# Patient Record
Sex: FEMALE | Race: OTHER
[De-identification: names, ages, dates, MRNs, and addresses within clinical notes are randomized per-mention and may not be internally consistent; named-entity substitution may affect disease eponyms.]

---

## 2019-10-09 PROBLEM — Z00.00 ENCOUNTER FOR PREVENTIVE HEALTH EXAMINATION: Status: ACTIVE | Noted: 2019-10-09

## 2019-10-10 ENCOUNTER — APPOINTMENT (OUTPATIENT)
Dept: ENDOCRINOLOGY | Facility: CLINIC | Age: 29
End: 2019-10-10
Payer: MEDICAID

## 2019-10-10 VITALS
DIASTOLIC BLOOD PRESSURE: 73 MMHG | OXYGEN SATURATION: 97 % | SYSTOLIC BLOOD PRESSURE: 101 MMHG | HEART RATE: 102 BPM | HEIGHT: 67 IN | TEMPERATURE: 98.5 F | BODY MASS INDEX: 21.35 KG/M2 | WEIGHT: 136 LBS

## 2019-10-10 DIAGNOSIS — L80 VITILIGO: ICD-10-CM

## 2019-10-10 DIAGNOSIS — Z80.0 FAMILY HISTORY OF MALIGNANT NEOPLASM OF DIGESTIVE ORGANS: ICD-10-CM

## 2019-10-10 DIAGNOSIS — R22.1 LOCALIZED SWELLING, MASS AND LUMP, NECK: ICD-10-CM

## 2019-10-10 DIAGNOSIS — R22.31 LOCALIZED SWELLING, MASS AND LUMP, RIGHT UPPER LIMB: ICD-10-CM

## 2019-10-10 PROCEDURE — 99406 BEHAV CHNG SMOKING 3-10 MIN: CPT

## 2019-10-10 PROCEDURE — 99205 OFFICE O/P NEW HI 60 MIN: CPT | Mod: 25

## 2023-11-12 ENCOUNTER — HOSPITAL ENCOUNTER (EMERGENCY)
Facility: HOSPITAL | Age: 33
Discharge: HOME | End: 2023-11-12
Attending: EMERGENCY MEDICINE
Payer: MEDICAID

## 2023-11-12 ENCOUNTER — APPOINTMENT (EMERGENCY)
Dept: CT IMAGING | Facility: HOSPITAL | Age: 33
End: 2023-11-12
Payer: MEDICAID

## 2023-11-12 VITALS
SYSTOLIC BLOOD PRESSURE: 133 MMHG | HEART RATE: 94 BPM | DIASTOLIC BLOOD PRESSURE: 86 MMHG | RESPIRATION RATE: 20 BRPM | OXYGEN SATURATION: 97 % | TEMPERATURE: 97.6 F
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
## 2023-11-12 DIAGNOSIS — S02.2XXA CLOSED FRACTURE OF NASAL BONE, INITIAL ENCOUNTER: ICD-10-CM

## 2023-11-12 DIAGNOSIS — S09.90XA INJURY OF HEAD, INITIAL ENCOUNTER: ICD-10-CM

## 2023-11-12 DIAGNOSIS — T14.8XXA BRUISING: ICD-10-CM

## 2023-11-12 LAB
EXT PREGNANCY TEST URINE: NEGATIVE
EXT. CONTROL: NORMAL

## 2023-11-12 PROCEDURE — 81025 URINE PREGNANCY TEST: CPT | Performed by: EMERGENCY MEDICINE

## 2023-11-12 PROCEDURE — 99284 EMERGENCY DEPT VISIT MOD MDM: CPT

## 2023-11-12 PROCEDURE — 99284 EMERGENCY DEPT VISIT MOD MDM: CPT | Performed by: EMERGENCY MEDICINE

## 2023-11-12 PROCEDURE — 70486 CT MAXILLOFACIAL W/O DYE: CPT

## 2023-11-12 PROCEDURE — 70450 CT HEAD/BRAIN W/O DYE: CPT

## 2023-11-12 RX ORDER — ACETAMINOPHEN 325 MG/1
650 TABLET ORAL ONCE
Status: COMPLETED | OUTPATIENT
Start: 2023-11-12 | End: 2023-11-12

## 2023-11-12 RX ORDER — IBUPROFEN 400 MG/1
400 TABLET ORAL ONCE
Status: COMPLETED | OUTPATIENT
Start: 2023-11-12 | End: 2023-11-12

## 2023-11-12 RX ADMIN — IBUPROFEN 400 MG: 400 TABLET, FILM COATED ORAL at 05:20

## 2023-11-12 RX ADMIN — ACETAMINOPHEN 650 MG: 325 TABLET, FILM COATED ORAL at 03:52
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
## 2023-11-12 NOTE — DISCHARGE INSTRUCTIONS
Follow up with your primary doctor/outpatient providers, and return to the emergency department for new or worsening symptoms. CT BRAIN - WITHOUT CONTRAST     INDICATION:   head injury. COMPARISON:  None. TECHNIQUE:  CT examination of the brain was performed. Multiplanar 2D reformatted images were created from the source data. Radiation dose length product (DLP) for this visit:  1604.4 mGy-cm . This examination, like all CT scans performed in the [facility], was performed utilizing techniques to minimize radiation dose exposure, including the use of iterative   reconstruction and automated exposure control. IMAGE QUALITY:  Diagnostic. FINDINGS:     PARENCHYMA:  No intracranial mass, mass effect or midline shift. No CT signs of acute infarction. No acute parenchymal hemorrhage. VENTRICLES AND EXTRA-AXIAL SPACES:  Normal for the patient's age. VISUALIZED ORBITS: Normal visualized orbits. PARANASAL SINUSES: Minimal mucosal thickening of the visualized paranasal sinuses. CALVARIUM AND EXTRACRANIAL SOFT TISSUES:  Normal.     IMPRESSION:     No acute intracranial abnormality. CT FACIAL BONES WITHOUT INTRAVENOUS CONTRAST     INDICATION:   nasal pain after injury. COMPARISON: None. TECHNIQUE:  Axial CT images were obtained through the facial bones with additional sagittal and coronal reconstructions. Radiation dose length product (DLP) for this visit:  0 mGy-cm . This examination, like all CT scans performed in the [facility], was performed utilizing techniques to minimize radiation dose exposure, including the use of iterative   reconstruction and automated exposure control. IMAGE QUALITY:  Diagnostic. FINDINGS:     FACIAL BONES: There is a subtle nondisplaced left nasal bone fracture. Normal alignment of the temporomandibular joints. No lytic or blastic lesion.      ORBITS:  Orbital globes, optic nerves, and extraocular muscles appear symmetric and normal. There is no evidence of retrobulbar mass, abscess, or hematoma. SINUSES: There is minimal to mild mucosal thickening of the bilateral maxillary sinuses. No air-fluid levels are identified. SOFT TISSUES:  Normal.     IMPRESSION:     Nondisplaced left nasal bone fracture.

## 2024-01-17 LAB
EXTERNAL CHLAMYDIA SCREEN: NEGATIVE
EXTERNAL GONORRHEA SCREEN: NEGATIVE
EXTERNAL HEMATOCRIT: 36.9 %
EXTERNAL HEMOGLOBIN: 12.7 G/DL
EXTERNAL HEPATITIS B SURFACE ANTIGEN: NEGATIVE
EXTERNAL HIV-1 P24 ANTIGEN: NORMAL
EXTERNAL PLATELET COUNT: 287 K/ÂΜL
EXTERNAL RUBELLA IGG QUANTITATION: 3.49
EXTERNAL SYPHILIS TOTAL IGG/IGM SCREENING: NORMAL
HCV AB SER-ACNC: NON REACTIVE

## 2024-02-14 ENCOUNTER — NON-APPOINTMENT (OUTPATIENT)
Age: 34
End: 2024-02-14

## 2024-02-15 ENCOUNTER — NON-APPOINTMENT (OUTPATIENT)
Age: 34
End: 2024-02-15

## 2024-02-27 ENCOUNTER — NON-APPOINTMENT (OUTPATIENT)
Age: 34
End: 2024-02-27

## 2024-02-27 ENCOUNTER — APPOINTMENT (OUTPATIENT)
Dept: OBGYN | Facility: CLINIC | Age: 34
End: 2024-02-27
Payer: MEDICAID

## 2024-02-27 VITALS
HEIGHT: 67 IN | WEIGHT: 171 LBS | BODY MASS INDEX: 26.84 KG/M2 | DIASTOLIC BLOOD PRESSURE: 60 MMHG | SYSTOLIC BLOOD PRESSURE: 102 MMHG

## 2024-02-27 DIAGNOSIS — F19.91 OTHER PSYCHOACTIVE SUBSTANCE USE, UNSPECIFIED, IN REMISSION: ICD-10-CM

## 2024-02-27 DIAGNOSIS — Z80.8 FAMILY HISTORY OF MALIGNANT NEOPLASM OF OTHER ORGANS OR SYSTEMS: ICD-10-CM

## 2024-02-27 DIAGNOSIS — Z78.9 OTHER SPECIFIED HEALTH STATUS: ICD-10-CM

## 2024-02-27 DIAGNOSIS — Z34.90 ENCOUNTER FOR SUPERVISION OF NORMAL PREGNANCY, UNSPECIFIED, UNSPECIFIED TRIMESTER: ICD-10-CM

## 2024-02-27 DIAGNOSIS — F17.200 NICOTINE DEPENDENCE, UNSPECIFIED, UNCOMPLICATED: ICD-10-CM

## 2024-02-27 LAB
BILIRUB UR QL STRIP: NORMAL
CLARITY UR: CLEAR
COLLECTION METHOD: NORMAL
GLUCOSE UR-MCNC: NORMAL
HCG UR QL: 0.2 EU/DL
HGB UR QL STRIP.AUTO: NORMAL
KETONES UR-MCNC: NORMAL
LEUKOCYTE ESTERASE UR QL STRIP: NORMAL
NITRITE UR QL STRIP: NORMAL
PH UR STRIP: 7
PROT UR STRIP-MCNC: NORMAL
SP GR UR STRIP: 1.01

## 2024-02-27 PROCEDURE — 99203 OFFICE O/P NEW LOW 30 MIN: CPT

## 2024-02-27 PROCEDURE — 36415 COLL VENOUS BLD VENIPUNCTURE: CPT

## 2024-02-27 RX ORDER — ELASTIC BANDAGE 2"X2.2YD
BANDAGE TOPICAL
Refills: 0 | Status: ACTIVE | COMMUNITY

## 2024-03-04 ENCOUNTER — NON-APPOINTMENT (OUTPATIENT)
Age: 34
End: 2024-03-04

## 2024-03-04 LAB
AFP MOM: 1
AFP VALUE: 36.8 NG/ML
ALPHA FETOPROTEIN SERUM COMMENT: NORMAL
ALPHA FETOPROTEIN SERUM INTERPRETATION: NORMAL
ALPHA FETOPROTEIN SERUM RESULTS: NORMAL
ALPHA FETOPROTEIN SERUM TEST RESULTS: NORMAL
GESTATIONAL AGE BASED ON: NORMAL
GESTATIONAL AGE ON COLLECTION DATE: 17.3 WEEKS
INSULIN DEP DIABETES: NO
MATERNAL AGE AT EDD AFP: 36.4 YR
MULTIPLE GESTATION: NO
OSBR RISK 1 IN: NORMAL
RACE: NORMAL
WEIGHT AFP: 171 LBS

## 2024-03-06 ENCOUNTER — NON-APPOINTMENT (OUTPATIENT)
Age: 34
End: 2024-03-06

## 2024-03-18 ENCOUNTER — NON-APPOINTMENT (OUTPATIENT)
Age: 34
End: 2024-03-18

## 2024-03-25 ENCOUNTER — NON-APPOINTMENT (OUTPATIENT)
Age: 34
End: 2024-03-25

## 2024-03-27 ENCOUNTER — APPOINTMENT (OUTPATIENT)
Dept: OBGYN | Facility: CLINIC | Age: 34
End: 2024-03-27
Payer: MEDICAID

## 2024-03-27 VITALS
DIASTOLIC BLOOD PRESSURE: 60 MMHG | HEIGHT: 67 IN | SYSTOLIC BLOOD PRESSURE: 100 MMHG | WEIGHT: 179 LBS | BODY MASS INDEX: 28.09 KG/M2

## 2024-03-27 PROCEDURE — 0502F SUBSEQUENT PRENATAL CARE: CPT

## 2024-03-31 LAB
BILIRUB UR QL STRIP: NEGATIVE
CLARITY UR: NORMAL
COLLECTION METHOD: NORMAL
GLUCOSE UR-MCNC: NEGATIVE
HCG UR QL: 0.2 EU/DL
HGB UR QL STRIP.AUTO: NEGATIVE
KETONES UR-MCNC: NEGATIVE
LEUKOCYTE ESTERASE UR QL STRIP: NEGATIVE
NITRITE UR QL STRIP: NEGATIVE
PH UR STRIP: 6.5
PROT UR STRIP-MCNC: NEGATIVE
SP GR UR STRIP: 1.01

## 2024-04-18 ENCOUNTER — NON-APPOINTMENT (OUTPATIENT)
Age: 34
End: 2024-04-18

## 2024-04-22 DIAGNOSIS — Z11.3 ENCOUNTER FOR SCREENING FOR INFECTIONS WITH A PREDOMINANTLY SEXUAL MODE OF TRANSMISSION: ICD-10-CM

## 2024-04-22 DIAGNOSIS — Z3A.25 25 WEEKS GESTATION OF PREGNANCY: ICD-10-CM

## 2024-05-01 ENCOUNTER — APPOINTMENT (OUTPATIENT)
Dept: OBGYN | Facility: CLINIC | Age: 34
End: 2024-05-01

## 2024-05-08 ENCOUNTER — TELEPHONE (OUTPATIENT)
Dept: PSYCHIATRY | Facility: CLINIC | Age: 34
End: 2024-05-08

## 2024-05-08 NOTE — TELEPHONE ENCOUNTER
Patient has been added to the [facility] wait list without a referral.    Insurance: [employer] (insurance will be active 5/15/24)   Insurance Type:    Commercial []   Medicaid [x]   [facility] (if applicable)   Medicare []  Location Preference: Anywhere  Provider Preference: none  Virtual: Yes [] No [x]  Were outside resources sent: Yes [] No [x]  Advised the patient to contact her PCP for a referral.     Presenting Problem  Stress   Anxiety

## 2024-05-09 ENCOUNTER — NON-APPOINTMENT (OUTPATIENT)
Age: 34
End: 2024-05-09

## 2024-05-13 ENCOUNTER — NON-APPOINTMENT (OUTPATIENT)
Age: 34
End: 2024-05-13

## 2024-05-14 ENCOUNTER — APPOINTMENT (OUTPATIENT)
Dept: OBGYN | Facility: CLINIC | Age: 34
End: 2024-05-14
Payer: MEDICAID

## 2024-05-14 ENCOUNTER — NON-APPOINTMENT (OUTPATIENT)
Age: 34
End: 2024-05-14

## 2024-05-14 VITALS
HEIGHT: 67 IN | WEIGHT: 186 LBS | SYSTOLIC BLOOD PRESSURE: 100 MMHG | DIASTOLIC BLOOD PRESSURE: 70 MMHG | BODY MASS INDEX: 29.19 KG/M2

## 2024-05-14 LAB
BILIRUB UR QL STRIP: NORMAL
CLARITY UR: CLEAR
COLLECTION METHOD: NORMAL
GLUCOSE UR-MCNC: NORMAL
HCG UR QL: 0.2 EU/DL
HGB UR QL STRIP.AUTO: NORMAL
KETONES UR-MCNC: NORMAL
LEUKOCYTE ESTERASE UR QL STRIP: NORMAL
NITRITE UR QL STRIP: NORMAL
PH UR STRIP: 6.5
PROT UR STRIP-MCNC: NORMAL
SP GR UR STRIP: 1.01

## 2024-05-14 PROCEDURE — 0502F SUBSEQUENT PRENATAL CARE: CPT

## 2024-05-14 PROCEDURE — 36415 COLL VENOUS BLD VENIPUNCTURE: CPT

## 2024-05-15 ENCOUNTER — NON-APPOINTMENT (OUTPATIENT)
Age: 34
End: 2024-05-15

## 2024-05-15 LAB
BASOPHILS # BLD AUTO: 0.05 K/UL
BASOPHILS NFR BLD AUTO: 0.3 %
EOSINOPHIL # BLD AUTO: 0.12 K/UL
EOSINOPHIL NFR BLD AUTO: 0.8 %
FERRITIN SERPL-MCNC: 16 NG/ML
GLUCOSE 1H P 50 G GLC PO SERPL-MCNC: 165 MG/DL
HCT VFR BLD CALC: 34.1 %
HGB BLD-MCNC: 11.3 G/DL
IMM GRANULOCYTES NFR BLD AUTO: 0.7 %
LYMPHOCYTES # BLD AUTO: 2.33 K/UL
LYMPHOCYTES NFR BLD AUTO: 15.4 %
MAN DIFF?: NORMAL
MCHC RBC-ENTMCNC: 31.2 PG
MCHC RBC-ENTMCNC: 33.1 GM/DL
MCV RBC AUTO: 94.2 FL
MONOCYTES # BLD AUTO: 1.24 K/UL
MONOCYTES NFR BLD AUTO: 8.2 %
NEUTROPHILS # BLD AUTO: 11.28 K/UL
NEUTROPHILS NFR BLD AUTO: 74.6 %
PLATELET # BLD AUTO: 257 K/UL
RBC # BLD: 3.62 M/UL
RBC # FLD: 13.5 %
WBC # FLD AUTO: 15.12 K/UL

## 2024-05-20 ENCOUNTER — NON-APPOINTMENT (OUTPATIENT)
Age: 34
End: 2024-05-20

## 2024-05-22 ENCOUNTER — NON-APPOINTMENT (OUTPATIENT)
Age: 34
End: 2024-05-22

## 2024-06-16 NOTE — PROGRESS NOTES
S: 34 y.o. G***P*** No obstetric history on file. who presents for viability scan with LMP of ***. She is *** weeks and *** days by her LMP. She reports ***. She *** cramping or vaginal bleeding. This is *** a planned and welcomed pregnancy. Her previous pregnancies ***.     No past medical history on file.    OB History   No obstetric history on file.        O:  There were no vitals filed for this visit.         TVUS: viable, vela *** IUP at *** weeks *** days with CRL ***. FHT ***. DYLON ***. Final dating via ***.      A/P:  #1. IUP at *** weeks and *** days  - Viable pregnancy on TVUS  - RTC in *** week for nurse intake visit    Problem List Items Addressed This Visit    None  Visit Diagnoses       Pelvic pain    -  Primary            Charlotte Blair PA-C  OB/GYN  6/16/2024  5:56 PM

## 2024-06-17 ENCOUNTER — ULTRASOUND (OUTPATIENT)
Dept: OBGYN CLINIC | Facility: CLINIC | Age: 34
End: 2024-06-17
Payer: COMMERCIAL

## 2024-06-17 VITALS
DIASTOLIC BLOOD PRESSURE: 72 MMHG | WEIGHT: 188 LBS | BODY MASS INDEX: 28.49 KG/M2 | SYSTOLIC BLOOD PRESSURE: 100 MMHG | HEIGHT: 68 IN

## 2024-06-17 DIAGNOSIS — N91.2 AMENORRHEA: Primary | ICD-10-CM

## 2024-06-17 DIAGNOSIS — Z34.90 EARLY STAGE OF PREGNANCY: ICD-10-CM

## 2024-06-17 PROCEDURE — 99204 OFFICE O/P NEW MOD 45 MIN: CPT | Performed by: PHYSICIAN ASSISTANT

## 2024-06-17 RX ORDER — OMEGA-3/DHA/EPA/FISH OIL 300-1000MG
2 CAPSULE ORAL DAILY
COMMUNITY
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
## 2024-06-20 ENCOUNTER — TELEPHONE (OUTPATIENT)
Dept: OBGYN CLINIC | Facility: CLINIC | Age: 34
End: 2024-06-20

## 2024-06-20 NOTE — TELEPHONE ENCOUNTER
Placed call to f/u with patient regarding continuing ob care with our office vs establishing with [facility] and midwife team vs moving out of state, as pt was considering these options.  Voicemail not set up and unable to leave a message at this time.

## 2024-06-26 ENCOUNTER — TELEPHONE (OUTPATIENT)
Age: 34
End: 2024-06-26

## 2024-06-28 ENCOUNTER — INITIAL PRENATAL (OUTPATIENT)
Dept: OBGYN CLINIC | Facility: CLINIC | Age: 34
End: 2024-06-28
Payer: COMMERCIAL

## 2024-06-28 ENCOUNTER — TELEPHONE (OUTPATIENT)
Dept: OBGYN CLINIC | Facility: CLINIC | Age: 34
End: 2024-06-28

## 2024-06-28 VITALS — WEIGHT: 197.2 LBS | BODY MASS INDEX: 30.95 KG/M2 | HEIGHT: 67 IN

## 2024-06-28 DIAGNOSIS — Z3A.34 34 WEEKS GESTATION OF PREGNANCY: Primary | ICD-10-CM

## 2024-06-28 DIAGNOSIS — Z91.410 HISTORY OF ADULT DOMESTIC PHYSICAL ABUSE: ICD-10-CM

## 2024-06-28 PROCEDURE — T1001 NURSING ASSESSMENT/EVALUATN: HCPCS | Performed by: NURSE PRACTITIONER
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
## 2024-06-28 NOTE — TELEPHONE ENCOUNTER
Left message patient's voicemail if she is planning OB care with [facility].  She was seen @ [facility] OB on 6/20/2024 & has future appts scheduled with them.   cough

## 2024-07-02 ENCOUNTER — ROUTINE PRENATAL (OUTPATIENT)
Dept: OBGYN CLINIC | Facility: CLINIC | Age: 34
End: 2024-07-02
Payer: COMMERCIAL

## 2024-07-02 ENCOUNTER — TELEPHONE (OUTPATIENT)
Age: 34
End: 2024-07-02

## 2024-07-02 VITALS — BODY MASS INDEX: 30.85 KG/M2 | WEIGHT: 197 LBS | DIASTOLIC BLOOD PRESSURE: 74 MMHG | SYSTOLIC BLOOD PRESSURE: 116 MMHG

## 2024-07-02 DIAGNOSIS — Z91.89 AT INCREASED RISK FOR INTIMATE PARTNER VIOLENCE: ICD-10-CM

## 2024-07-02 DIAGNOSIS — O99.310 ALCOHOL CONSUMPTION DURING PREGNANCY, ANTEPARTUM: ICD-10-CM

## 2024-07-02 DIAGNOSIS — O99.320 DRUG USE AFFECTING PREGNANCY, ANTEPARTUM: Primary | ICD-10-CM

## 2024-07-02 DIAGNOSIS — Z3A.35 35 WEEKS GESTATION OF PREGNANCY: ICD-10-CM

## 2024-07-02 PROCEDURE — 99213 OFFICE O/P EST LOW 20 MIN: CPT | Performed by: OBSTETRICS & GYNECOLOGY
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
## 2024-07-02 NOTE — ASSESSMENT & PLAN NOTE
- Continue PNV  - Labor precautions reviewed  - Fetal kick counts reviewed  - Labs: She is aware that we are still waiting on her lab records. She states that we should have the records from her prior practice no later than Thursday. She is aware that if we do not  have the labs by next week, we will ask her to complete the labs again in the [facility].   - Ultrasounds: Most recent US wnl at [facility] on 24  - Tdap: Declined  - Flu Shot: Will offer in season  - RSV:  Will offer during season (-) @ [id number]   - COVID: Vaccinated  - Rhogam: Unknown  - Delivery:  without epidural (open); She would like to be mobile; wishes for delayed cord clamping - she initially asked regarding 1 hour of delay but we discussed waiting until pulsations end as the blood would no longer be flowing in the cord at 1 hour and this increases her risk of bleeding. She declines all  vaccinations including vitamin K. This is a boy fetus and she does not plan for circumcision.  - Contraception: Doesn't really want BC; had ParaGard previously; Discussed LNG-IUD  - Breastfeeding: Yes; pump ordered  - Pediatrician: JOSE MANUEL; She is hoping to find a pediatrician that will take her without the vaccinations  - RTO in 1 week

## 2024-07-02 NOTE — TELEPHONE ENCOUNTER
Patient called to see if medical records had been received from previous practices. Reviewed patient's chart and , no medical records received or scanned into chart. Spoke with  to confirm. Assured patient she would still be seen at appointment today.

## 2024-07-03 LAB
DME PARACHUTE DELIVERY DATE REQUESTED: NORMAL
DME PARACHUTE ITEM DESCRIPTION: NORMAL
DME PARACHUTE ORDER STATUS: NORMAL
DME PARACHUTE SUPPLIER NAME: NORMAL
DME PARACHUTE SUPPLIER PHONE: NORMAL

## 2024-07-08 ENCOUNTER — ROUTINE PRENATAL (OUTPATIENT)
Dept: OBGYN CLINIC | Facility: CLINIC | Age: 34
End: 2024-07-08
Payer: COMMERCIAL

## 2024-07-08 VITALS — DIASTOLIC BLOOD PRESSURE: 76 MMHG | SYSTOLIC BLOOD PRESSURE: 110 MMHG | WEIGHT: 195 LBS | BODY MASS INDEX: 30.54 KG/M2

## 2024-07-08 DIAGNOSIS — Z3A.36 36 WEEKS GESTATION OF PREGNANCY: Primary | ICD-10-CM

## 2024-07-08 PROCEDURE — 99213 OFFICE O/P EST LOW 20 MIN: CPT | Performed by: OBSTETRICS & GYNECOLOGY
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
## 2024-07-17 ENCOUNTER — TELEPHONE (OUTPATIENT)
Dept: OBGYN CLINIC | Facility: CLINIC | Age: 34
End: 2024-07-17

## 2024-07-17 NOTE — TELEPHONE ENCOUNTER
[facility] to offer appointment tomorrow 7/18 at 7:30 with Leanna.     Teo Price PA-C  [facility]  Patient cancelled out of my schedule for today - 37 weeks and had transferred to us at 34 weeks - not sure if she med all docs; please reach out to see if we can get her in this week - preferably with a doc and stress importance of her weekly appointments until delivery thanks

## 2024-07-18 ENCOUNTER — ROUTINE PRENATAL (OUTPATIENT)
Dept: OBGYN CLINIC | Facility: CLINIC | Age: 34
End: 2024-07-18
Payer: COMMERCIAL

## 2024-07-18 VITALS — WEIGHT: 196 LBS | SYSTOLIC BLOOD PRESSURE: 116 MMHG | DIASTOLIC BLOOD PRESSURE: 72 MMHG | BODY MASS INDEX: 30.7 KG/M2

## 2024-07-18 DIAGNOSIS — Z34.93 PRENATAL CARE IN THIRD TRIMESTER: Primary | ICD-10-CM

## 2024-07-18 DIAGNOSIS — Z91.89 AT INCREASED RISK FOR INTIMATE PARTNER VIOLENCE: ICD-10-CM

## 2024-07-18 DIAGNOSIS — O99.320 DRUG USE AFFECTING PREGNANCY, ANTEPARTUM: ICD-10-CM

## 2024-07-18 DIAGNOSIS — Z3A.35 35 WEEKS GESTATION OF PREGNANCY: ICD-10-CM

## 2024-07-18 PROCEDURE — 99213 OFFICE O/P EST LOW 20 MIN: CPT | Performed by: STUDENT IN AN ORGANIZED HEALTH CARE EDUCATION/TRAINING PROGRAM
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
## 2024-07-24 NOTE — PROGRESS NOTES
VISIT 34 yr old  at 38w4d: GBS neg; (+) n - vomited once; (+) heartburn; irreg Bhs; (+) spotting - noticed with mucous for first time yesterday; Denies HA/lof/edema/dv; (+) smoking - vaping - encouraged discontinuation; urine trace/neg  Labs under media - did not receive GDM screen - states failed 1 hour and passed 3 hour - reviewed the importance of having results scanned in chart or PP they may need to screen baby's sugars - will plan to call prior OB or check on a mychart; PNC - none - transferred at 34w;  PNVs + DHA - tolerating daily  Good FM - r/wanda 10 kicks/2 hrs   No change in cervix - FT/50/-2    Encouraged hydration.   Reviewed signs and symptoms of labor and when to call; Reviewed signs and symptoms of pregnancy induced hypertension or preeclampsia and when to call  Reviewed eIOL - would like to try for labor on own; open to induction if needed - reviewed 41 if not delivered by then or sooner if desires  RTO in 1 week for routine ob check or sooner if needed

## 2024-07-25 ENCOUNTER — ROUTINE PRENATAL (OUTPATIENT)
Dept: OBGYN CLINIC | Facility: CLINIC | Age: 34
End: 2024-07-25
Payer: COMMERCIAL

## 2024-07-25 VITALS — WEIGHT: 197 LBS | DIASTOLIC BLOOD PRESSURE: 78 MMHG | BODY MASS INDEX: 30.85 KG/M2 | SYSTOLIC BLOOD PRESSURE: 112 MMHG

## 2024-07-25 DIAGNOSIS — Z3A.38 38 WEEKS GESTATION OF PREGNANCY: Primary | ICD-10-CM

## 2024-07-25 DIAGNOSIS — O99.310 ALCOHOL CONSUMPTION DURING PREGNANCY, ANTEPARTUM: ICD-10-CM

## 2024-07-25 DIAGNOSIS — Z91.89 AT INCREASED RISK FOR INTIMATE PARTNER VIOLENCE: ICD-10-CM

## 2024-07-25 DIAGNOSIS — O99.320 DRUG USE AFFECTING PREGNANCY, ANTEPARTUM: ICD-10-CM

## 2024-07-25 LAB
DME PARACHUTE DELIVERY DATE ACTUAL: NORMAL
DME PARACHUTE DELIVERY DATE REQUESTED: NORMAL
DME PARACHUTE ITEM DESCRIPTION: NORMAL
DME PARACHUTE ORDER STATUS: NORMAL
DME PARACHUTE SUPPLIER NAME: NORMAL
DME PARACHUTE SUPPLIER PHONE: NORMAL

## 2024-07-25 PROCEDURE — 99213 OFFICE O/P EST LOW 20 MIN: CPT | Performed by: PHYSICIAN ASSISTANT

## 2024-07-29 ENCOUNTER — ROUTINE PRENATAL (OUTPATIENT)
Dept: OBGYN CLINIC | Facility: CLINIC | Age: 34
End: 2024-07-29
Payer: COMMERCIAL

## 2024-07-29 ENCOUNTER — CLINICAL SUPPORT (OUTPATIENT)
Dept: POSTPARTUM | Facility: CLINIC | Age: 34
End: 2024-07-29

## 2024-07-29 VITALS — BODY MASS INDEX: 31.01 KG/M2 | WEIGHT: 198 LBS | SYSTOLIC BLOOD PRESSURE: 100 MMHG | DIASTOLIC BLOOD PRESSURE: 78 MMHG

## 2024-07-29 DIAGNOSIS — Z32.2 ENCOUNTER FOR CHILDBIRTH INSTRUCTION: Primary | ICD-10-CM

## 2024-07-29 DIAGNOSIS — Z34.93 PRENATAL CARE IN THIRD TRIMESTER: Primary | ICD-10-CM

## 2024-07-29 PROCEDURE — 99213 OFFICE O/P EST LOW 20 MIN: CPT | Performed by: STUDENT IN AN ORGANIZED HEALTH CARE EDUCATION/TRAINING PROGRAM
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
## 2024-08-03 ENCOUNTER — CLINICAL SUPPORT (OUTPATIENT)
Age: 34
End: 2024-08-03

## 2024-08-03 DIAGNOSIS — Z32.2 ENCOUNTER FOR CHILDBIRTH INSTRUCTION: Primary | ICD-10-CM

## 2024-08-08 ENCOUNTER — HOSPITAL ENCOUNTER (INPATIENT)
Facility: HOSPITAL | Age: 34
LOS: 3 days | Discharge: HOME | DRG: 560 | End: 2024-08-11
Attending: STUDENT IN AN ORGANIZED HEALTH CARE EDUCATION/TRAINING PROGRAM | Admitting: STUDENT IN AN ORGANIZED HEALTH CARE EDUCATION/TRAINING PROGRAM
Payer: COMMERCIAL

## 2024-08-08 ENCOUNTER — ROUTINE PRENATAL (OUTPATIENT)
Dept: OBGYN CLINIC | Facility: CLINIC | Age: 34
End: 2024-08-08
Payer: COMMERCIAL

## 2024-08-08 VITALS — WEIGHT: 198 LBS | DIASTOLIC BLOOD PRESSURE: 74 MMHG | SYSTOLIC BLOOD PRESSURE: 106 MMHG | BODY MASS INDEX: 31.01 KG/M2

## 2024-08-08 DIAGNOSIS — Z34.93 PRENATAL CARE IN THIRD TRIMESTER: ICD-10-CM

## 2024-08-08 DIAGNOSIS — O36.8130 DECREASED FETAL MOVEMENTS IN THIRD TRIMESTER, SINGLE OR UNSPECIFIED FETUS: ICD-10-CM

## 2024-08-08 DIAGNOSIS — Z34.90 ENCOUNTER FOR INDUCTION OF LABOR: ICD-10-CM

## 2024-08-08 DIAGNOSIS — O41.00X0 OLIGOHYDRAMNIOS, ANTEPARTUM, SINGLE OR UNSPECIFIED FETUS: ICD-10-CM

## 2024-08-08 DIAGNOSIS — Z3A.40 40 WEEKS GESTATION OF PREGNANCY: Primary | ICD-10-CM

## 2024-08-08 DIAGNOSIS — Z91.89 AT INCREASED RISK FOR INTIMATE PARTNER VIOLENCE: ICD-10-CM

## 2024-08-08 PROBLEM — R30.0 DYSURIA: Status: ACTIVE | Noted: 2024-08-08

## 2024-08-08 LAB
ABO GROUP BLD: NORMAL
ABO GROUP BLD: NORMAL
AMPHETAMINES SERPL QL SCN: NEGATIVE
BACTERIA UR QL AUTO: ABNORMAL /HPF
BARBITURATES UR QL: NEGATIVE
BENZODIAZ UR QL: NEGATIVE
BILIRUB UR QL STRIP: NEGATIVE
BLD GP AB SCN SERPL QL: NEGATIVE
CLARITY UR: CLEAR
COCAINE UR QL: NEGATIVE
COLOR UR: ABNORMAL
ERYTHROCYTE [DISTWIDTH] IN BLOOD BY AUTOMATED COUNT: 13.2 % (ref 11.6–15.1)
FENTANYL UR QL SCN: NEGATIVE
GLUCOSE UR STRIP-MCNC: NEGATIVE MG/DL
HCT VFR BLD AUTO: 39.9 % (ref 34.8–46.1)
HGB BLD-MCNC: 13.6 G/DL (ref 11.5–15.4)
HGB UR QL STRIP.AUTO: ABNORMAL
HOLD SPECIMEN: NORMAL
HYDROCODONE UR QL SCN: NEGATIVE
KETONES UR STRIP-MCNC: NEGATIVE MG/DL
LEUKOCYTE ESTERASE UR QL STRIP: ABNORMAL
MCH RBC QN AUTO: 31.9 PG (ref 26.8–34.3)
MCHC RBC AUTO-ENTMCNC: 34.1 G/DL (ref 31.4–37.4)
MCV RBC AUTO: 93 FL (ref 82–98)
METHADONE UR QL: NEGATIVE
NITRITE UR QL STRIP: NEGATIVE
NON-SQ EPI CELLS URNS QL MICRO: ABNORMAL /HPF
OPIATES UR QL SCN: NEGATIVE
OXYCODONE+OXYMORPHONE UR QL SCN: NEGATIVE
PCP UR QL: NEGATIVE
PH UR STRIP.AUTO: 6 [PH]
PLATELET # BLD AUTO: 181 THOUSANDS/UL (ref 149–390)
PMV BLD AUTO: 11.5 FL (ref 8.9–12.7)
PROT UR STRIP-MCNC: ABNORMAL MG/DL
RBC # BLD AUTO: 4.27 MILLION/UL (ref 3.81–5.12)
RBC #/AREA URNS AUTO: ABNORMAL /HPF
RH BLD: POSITIVE
RH BLD: POSITIVE
SP GR UR STRIP.AUTO: 1 (ref 1–1.03)
SPECIMEN EXPIRATION DATE: NORMAL
THC UR QL: NEGATIVE
UROBILINOGEN UR STRIP-ACNC: <2 MG/DL
WBC # BLD AUTO: 12.97 THOUSAND/UL (ref 4.31–10.16)
WBC #/AREA URNS AUTO: ABNORMAL /HPF

## 2024-08-08 PROCEDURE — 86780 TREPONEMA PALLIDUM: CPT

## 2024-08-08 PROCEDURE — 80307 DRUG TEST PRSMV CHEM ANLYZR: CPT

## 2024-08-08 PROCEDURE — 99213 OFFICE O/P EST LOW 20 MIN: CPT | Performed by: STUDENT IN AN ORGANIZED HEALTH CARE EDUCATION/TRAINING PROGRAM

## 2024-08-08 PROCEDURE — 86901 BLOOD TYPING SEROLOGIC RH(D): CPT

## 2024-08-08 PROCEDURE — 86850 RBC ANTIBODY SCREEN: CPT

## 2024-08-08 PROCEDURE — NC001 PR NO CHARGE: Performed by: STUDENT IN AN ORGANIZED HEALTH CARE EDUCATION/TRAINING PROGRAM

## 2024-08-08 PROCEDURE — 99213 OFFICE O/P EST LOW 20 MIN: CPT

## 2024-08-08 PROCEDURE — 81001 URINALYSIS AUTO W/SCOPE: CPT

## 2024-08-08 PROCEDURE — 86900 BLOOD TYPING SEROLOGIC ABO: CPT

## 2024-08-08 PROCEDURE — 4A1HXCZ MONITORING OF PRODUCTS OF CONCEPTION, CARDIAC RATE, EXTERNAL APPROACH: ICD-10-PCS | Performed by: OBSTETRICS & GYNECOLOGY

## 2024-08-08 PROCEDURE — 85027 COMPLETE CBC AUTOMATED: CPT

## 2024-08-08 PROCEDURE — 3E033VJ INTRODUCTION OF OTHER HORMONE INTO PERIPHERAL VEIN, PERCUTANEOUS APPROACH: ICD-10-PCS | Performed by: OBSTETRICS & GYNECOLOGY

## 2024-08-08 RX ORDER — BUPIVACAINE HYDROCHLORIDE 2.5 MG/ML
30 INJECTION, SOLUTION EPIDURAL; INFILTRATION; INTRACAUDAL ONCE AS NEEDED
Status: DISCONTINUED | OUTPATIENT
Start: 2024-08-08 | End: 2024-08-09

## 2024-08-08 RX ORDER — SODIUM CHLORIDE, SODIUM LACTATE, POTASSIUM CHLORIDE, CALCIUM CHLORIDE 600; 310; 30; 20 MG/100ML; MG/100ML; MG/100ML; MG/100ML
125 INJECTION, SOLUTION INTRAVENOUS CONTINUOUS
Status: DISCONTINUED | OUTPATIENT
Start: 2024-08-08 | End: 2024-08-11 | Stop reason: HOSPADM

## 2024-08-08 RX ORDER — OXYTOCIN/RINGER'S LACTATE 30/500 ML
1-30 PLASTIC BAG, INJECTION (ML) INTRAVENOUS
Status: DISCONTINUED | OUTPATIENT
Start: 2024-08-08 | End: 2024-08-09

## 2024-08-08 RX ORDER — FLUCONAZOLE 150 MG/1
150 TABLET ORAL ONCE
Status: COMPLETED | OUTPATIENT
Start: 2024-08-08 | End: 2024-08-08

## 2024-08-08 RX ORDER — ONDANSETRON 2 MG/ML
4 INJECTION INTRAMUSCULAR; INTRAVENOUS EVERY 6 HOURS PRN
Status: DISCONTINUED | OUTPATIENT
Start: 2024-08-08 | End: 2024-08-09

## 2024-08-08 RX ADMIN — OXYTOCIN 2 MILLI-UNITS/MIN: 10 INJECTION INTRAVENOUS at 19:15

## 2024-08-08 RX ADMIN — FLUCONAZOLE 150 MG: 150 TABLET ORAL at 19:16

## 2024-08-08 RX ADMIN — SODIUM CHLORIDE, SODIUM LACTATE, POTASSIUM CHLORIDE, AND CALCIUM CHLORIDE 125 ML/HR: .6; .31; .03; .02 INJECTION, SOLUTION INTRAVENOUS at 18:14

## 2024-08-08 NOTE — ASSESSMENT & PLAN NOTE
- admit for induction of labor iso oligohydramnios and decreased fetal movement  - IV fluids, clear liquid diet  - labs: CBC, RPR, T/S  - analgesia at maternal request  - management: Gregory balloon, pitocin titration

## 2024-08-08 NOTE — PLAN OF CARE
Problem: PAIN - ADULT  Goal: Verbalizes/displays adequate comfort level or baseline comfort level  Description: Interventions:  - Encourage patient to monitor pain and request assistance  - Assess pain using appropriate pain scale  - Administer analgesics based on type and severity of pain and evaluate response  - Implement non-pharmacological measures as appropriate and evaluate response  - Consider cultural and social influences on pain and pain management  - Notify physician/advanced practitioner if interventions unsuccessful or patient reports new pain  Outcome: Progressing     Problem: INFECTION - ADULT  Goal: Absence or prevention of progression during hospitalization  Description: INTERVENTIONS:  - Assess and monitor for signs and symptoms of infection  - Monitor lab/diagnostic results  - Monitor all insertion sites, i.e. indwelling lines, tubes, and drains  - Monitor endotracheal if appropriate and nasal secretions for changes in amount and color  - [place] appropriate cooling/warming therapies per order  - Administer medications as ordered  - Instruct and encourage patient and family to use good hand hygiene technique  - Identify and instruct in appropriate isolation precautions for identified infection/condition  Outcome: Progressing  Goal: Absence of fever/infection during neutropenic period  Description: INTERVENTIONS:  - Monitor WBC    Outcome: Progressing     Problem: SAFETY ADULT  Goal: Patient will remain free of falls  Description: INTERVENTIONS:  - Educate patient/family on patient safety including physical limitations  - Instruct patient to call for assistance with activity   - Consult OT/PT to assist with strengthening/mobility   - Keep Call bell within reach  - Keep bed low and locked with side rails adjusted as appropriate  - Keep care items and personal belongings within reach  - Initiate and maintain comfort rounds  - Make Fall Risk Sign visible to staff  - Offer Toileting every PRN  Hours, in advance of need  - Apply yellow socks and bracelet for high fall risk patients  - Consider moving patient to room near nurses station  Outcome: Progressing  Goal: Maintain or return to baseline ADL function  Description: INTERVENTIONS:  -  Assess patient's ability to carry out ADLs; assess patient's baseline for ADL function and identify physical deficits which impact ability to perform ADLs (bathing, care of mouth/teeth, toileting, grooming, dressing, etc.)  - Assess/evaluate cause of self-care deficits   - Assess range of motion  - Assess patient's mobility; develop plan if impaired  - Assess patient's need for assistive devices and provide as appropriate  - Encourage maximum independence but intervene and supervise when necessary  - Involve family in performance of ADLs  - Assess for home care needs following discharge   - Consider OT consult to assist with ADL evaluation and planning for discharge  - Provide patient education as appropriate  Outcome: Progressing  Goal: Maintains/Returns to pre admission functional level  Description: INTERVENTIONS:  - Perform AM-PAC 6 Click Basic Mobility/ Daily Activity assessment daily.  - Set and communicate daily mobility goal to care team and patient/family/caregiver.   - Collaborate with rehabilitation services on mobility goals if consulted  - Perform Range of Motion PRN times a day.  - Reposition patient every PRN hours.  - Dangle patient PRN times a day  - Stand patient PRN times a day  - Ambulate patient PRN times a day  - Out of bed to chair PRN times a day   - Out of bed for meals PRN times a day  - Out of bed for toileting  - Record patient progress and toleration of activity level   Outcome: Progressing     Problem: DISCHARGE PLANNING  Goal: Discharge to home or other facility with appropriate resources  Description: INTERVENTIONS:  - Identify barriers to discharge w/patient and caregiver  - Arrange for needed discharge resources and transportation as  appropriate  - Identify discharge learning needs (meds, wound care, etc.)  - Arrange for interpretive services to assist at discharge as needed  - Refer to Case Management Department for coordinating discharge planning if the patient needs post-hospital services based on physician/advanced practitioner order or complex needs related to functional status, cognitive ability, or social support system  Outcome: Progressing     Problem: Knowledge Deficit  Goal: Patient/family/caregiver demonstrates understanding of disease process, treatment plan, medications, and discharge instructions  Description: Complete learning assessment and assess knowledge base.  Interventions:  - Provide teaching at level of understanding  - Provide teaching via preferred learning methods  Outcome: Progressing  Goal: Verbalizes understanding of labor plan  Description: Assess patient/family/caregiver's baseline knowledge level and ability to understand information.  Provide education via patient/family/caregiver's preferred learning method at appropriate level of understanding.     1. Provide teaching at level of understanding.  2. Provide teaching via preferred learning method(s).  Outcome: Progressing     Problem: Labor & Delivery  Goal: Manages discomfort  Description: Assess and monitor for signs and symptoms of discomfort.  Assess patient's pain level regularly and per hospital policy.  Administer medications as ordered. Support use of nonpharmacological methods to help control pain such as distraction, imagery, relaxation, and application of heat and cold.  Collaborate with interdisciplinary team and patient to determine appropriate pain management plan.    1. Include patient in decisions related to comfort.  2. Offer non-pharmacological pain management interventions.  3. Report ineffective pain management to physician.  Outcome: Progressing  Goal: Patient vital signs are stable  Description: 1. Assess vital signs - vaginal  delivery.  Outcome: Progressing

## 2024-08-08 NOTE — ASSESSMENT & PLAN NOTE
Present on admission to triage. Patient also reports yellowish vaginal discharge x1 week. Yeast seen on KOH slide.  - fluconazole 150mg  - f/u UA with reflex to cx
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
## 2024-08-08 NOTE — PROGRESS NOTES
Cathryn is a 34 y.o.  40w4d. Reports ++FM, no LOF, VB, or regular contractions.     Vitals:    24 1300   BP: 106/74     S=D  +FHTs  SVE 1.5/70/-2  TAUS: vertex, DVP 1.79    A/P:  Elevated 1hr, normal 3hr GTT  GBS negative  Prefers minimal intervention and calm birthing atmosphere, delayed cord   Rh status POS    Breastfeeding: yes, has pump  Birth plan: reviewed recommendation for APFS if into 41 week, would accept IOL late next week     Sent to L&D for IOL

## 2024-08-09 ENCOUNTER — ANESTHESIA EVENT (INPATIENT)
Dept: ANESTHESIOLOGY | Facility: HOSPITAL | Age: 34
End: 2024-08-09
Payer: COMMERCIAL

## 2024-08-09 ENCOUNTER — ANESTHESIA (INPATIENT)
Dept: ANESTHESIOLOGY | Facility: HOSPITAL | Age: 34
End: 2024-08-09
Payer: COMMERCIAL

## 2024-08-09 LAB
BASE EXCESS BLDCOA CALC-SCNC: -9.5 MMOL/L (ref 3–11)
BASE EXCESS BLDCOV CALC-SCNC: -7.2 MMOL/L (ref 1–9)
HCO3 BLDCOA-SCNC: 20.7 MMOL/L (ref 17.3–27.3)
HCO3 BLDCOV-SCNC: 20.8 MMOL/L (ref 12.2–28.6)
O2 CT VFR BLDCOA CALC: 12.7 ML/DL
OXYHGB MFR BLDCOA: 48.4 %
OXYHGB MFR BLDCOV: 39.8 %
PCO2 BLDCOA: 61.2 MM[HG] (ref 30–60)
PCO2 BLDCOV: 50.8 MM HG (ref 27–43)
PH BLDCOA: 7.15 [PH] (ref 7.23–7.43)
PH BLDCOV: 7.23 [PH] (ref 7.19–7.49)
PO2 BLDCOA: 27.7 MM HG (ref 5–25)
PO2 BLDCOV: 21.1 MM HG (ref 15–45)
SAO2 % BLDCOV: 9.9 ML/DL
TREPONEMA PALLIDUM IGG+IGM AB [PRESENCE] IN SERUM OR PLASMA BY IMMUNOASSAY: NORMAL

## 2024-08-09 PROCEDURE — 59409 OBSTETRICAL CARE: CPT | Performed by: OBSTETRICS & GYNECOLOGY

## 2024-08-09 PROCEDURE — 82805 BLOOD GASES W/O2 SATURATION: CPT | Performed by: STUDENT IN AN ORGANIZED HEALTH CARE EDUCATION/TRAINING PROGRAM

## 2024-08-09 PROCEDURE — 0UQMXZZ REPAIR VULVA, EXTERNAL APPROACH: ICD-10-PCS | Performed by: OBSTETRICS & GYNECOLOGY

## 2024-08-09 RX ORDER — SIMETHICONE 80 MG
80 TABLET,CHEWABLE ORAL 4 TIMES DAILY PRN
Status: DISCONTINUED | OUTPATIENT
Start: 2024-08-09 | End: 2024-08-11 | Stop reason: HOSPADM

## 2024-08-09 RX ORDER — OXYCODONE HYDROCHLORIDE 5 MG/1
5 TABLET ORAL ONCE AS NEEDED
Status: DISCONTINUED | OUTPATIENT
Start: 2024-08-09 | End: 2024-08-11 | Stop reason: HOSPADM

## 2024-08-09 RX ORDER — LIDOCAINE HYDROCHLORIDE AND EPINEPHRINE 15; 5 MG/ML; UG/ML
INJECTION, SOLUTION EPIDURAL AS NEEDED
Status: DISCONTINUED | OUTPATIENT
Start: 2024-08-09 | End: 2024-08-10 | Stop reason: HOSPADM

## 2024-08-09 RX ORDER — CALCIUM CARBONATE 500 MG/1
1000 TABLET, CHEWABLE ORAL DAILY PRN
Status: DISCONTINUED | OUTPATIENT
Start: 2024-08-09 | End: 2024-08-11 | Stop reason: HOSPADM

## 2024-08-09 RX ORDER — DOCUSATE SODIUM 100 MG/1
100 CAPSULE, LIQUID FILLED ORAL 2 TIMES DAILY
Status: DISCONTINUED | OUTPATIENT
Start: 2024-08-09 | End: 2024-08-11 | Stop reason: HOSPADM

## 2024-08-09 RX ORDER — BENZOCAINE/MENTHOL 6 MG-10 MG
1 LOZENGE MUCOUS MEMBRANE DAILY PRN
Status: DISCONTINUED | OUTPATIENT
Start: 2024-08-09 | End: 2024-08-11 | Stop reason: HOSPADM

## 2024-08-09 RX ORDER — ACETAMINOPHEN 325 MG/1
975 TABLET ORAL EVERY 6 HOURS
Status: DISCONTINUED | OUTPATIENT
Start: 2024-08-10 | End: 2024-08-11 | Stop reason: HOSPADM

## 2024-08-09 RX ORDER — DIPHENHYDRAMINE HYDROCHLORIDE 50 MG/ML
25 INJECTION INTRAMUSCULAR; INTRAVENOUS EVERY 6 HOURS PRN
Status: DISCONTINUED | OUTPATIENT
Start: 2024-08-09 | End: 2024-08-11 | Stop reason: HOSPADM

## 2024-08-09 RX ORDER — IBUPROFEN 600 MG/1
600 TABLET, FILM COATED ORAL EVERY 6 HOURS
Status: DISCONTINUED | OUTPATIENT
Start: 2024-08-09 | End: 2024-08-11 | Stop reason: HOSPADM

## 2024-08-09 RX ORDER — OXYTOCIN/RINGER'S LACTATE 30/500 ML
250 PLASTIC BAG, INJECTION (ML) INTRAVENOUS ONCE
Status: COMPLETED | OUTPATIENT
Start: 2024-08-09 | End: 2024-08-10

## 2024-08-09 RX ORDER — ONDANSETRON 2 MG/ML
4 INJECTION INTRAMUSCULAR; INTRAVENOUS EVERY 8 HOURS PRN
Status: DISCONTINUED | OUTPATIENT
Start: 2024-08-09 | End: 2024-08-11 | Stop reason: HOSPADM

## 2024-08-09 RX ADMIN — LIDOCAINE HYDROCHLORIDE AND EPINEPHRINE 2 ML: 15; 5 INJECTION, SOLUTION EPIDURAL at 10:36

## 2024-08-09 RX ADMIN — SODIUM CHLORIDE, SODIUM LACTATE, POTASSIUM CHLORIDE, AND CALCIUM CHLORIDE 125 ML/HR: .6; .31; .03; .02 INJECTION, SOLUTION INTRAVENOUS at 10:59

## 2024-08-09 RX ADMIN — LIDOCAINE HYDROCHLORIDE AND EPINEPHRINE 3 ML: 15; 5 INJECTION, SOLUTION EPIDURAL at 10:34

## 2024-08-09 RX ADMIN — SODIUM CHLORIDE, SODIUM LACTATE, POTASSIUM CHLORIDE, AND CALCIUM CHLORIDE 125 ML/HR: .6; .31; .03; .02 INJECTION, SOLUTION INTRAVENOUS at 18:34

## 2024-08-09 RX ADMIN — ROPIVACAINE HYDROCHLORIDE: 2 INJECTION, SOLUTION EPIDURAL; INFILTRATION at 18:58

## 2024-08-09 RX ADMIN — ROPIVACAINE HYDROCHLORIDE: 2 INJECTION, SOLUTION EPIDURAL; INFILTRATION at 10:43

## 2024-08-09 RX ADMIN — OXYTOCIN 250 MILLI-UNITS/MIN: 10 INJECTION INTRAVENOUS at 22:19

## 2024-08-09 RX ADMIN — ONDANSETRON 4 MG: 2 INJECTION INTRAMUSCULAR; INTRAVENOUS at 19:41

## 2024-08-09 RX ADMIN — SODIUM CHLORIDE, SODIUM LACTATE, POTASSIUM CHLORIDE, AND CALCIUM CHLORIDE 999 ML/HR: .6; .31; .03; .02 INJECTION, SOLUTION INTRAVENOUS at 09:48

## 2024-08-09 RX ADMIN — SODIUM CHLORIDE, SODIUM LACTATE, POTASSIUM CHLORIDE, AND CALCIUM CHLORIDE 125 ML/HR: .6; .31; .03; .02 INJECTION, SOLUTION INTRAVENOUS at 02:14

## 2024-08-09 NOTE — OB LABOR/OXYTOCIN SAFETY PROGRESS
Oxytocin Safety Progress Check Note - Cathryn Craig 34 y.o. female MRN: [id number]    Unit/Bed#: -01 Encounter: [id number]    Dose (lizz-units/min) Oxytocin: 20 lizz-units/min  Contraction Frequency (minutes): 2-3  Contraction Intensity: Moderate  Uterine Activity Characteristics: Regular  Cervical Dilation: 4        Cervical Effacement: 80  Fetal Station: -1  Baseline Rate (FHR): 135 bpm  Fetal Heart Rate (FHT): 129 BPM  FHR Category: 1               Vital Signs:   Vitals:    08/09/24 1227   BP: 120/80   Pulse: 78   Resp:    Temp:    SpO2:        Notes/comments:   PT comfortable, no concerns or complaints.      Kaitlin Sepulveda MD 8/9/2024 12:36 PM

## 2024-08-09 NOTE — OB LABOR/OXYTOCIN SAFETY PROGRESS
Oxytocin Safety Progress Check Note- Cathryn Craig 34 y.o. female MRN: [id number]    Unit/Bed#: -01 Encounter: [id number]    Dose (lizz-units/min) Oxytocin: 6 lizz-units/min  Contraction Frequency (minutes): 1-4  Contraction Intensity: Mild  Uterine Activity Characteristics: Irregular  Cervical Dilation: 3-4        Cervical Effacement: 70  Fetal Station: -2  Baseline Rate (FHR): 130 bpm  Fetal Heart Rate (FHT): 131 BPM  FHR Category:                Vital Signs:   Vitals:    08/08/24 2307   BP: 125/71   Pulse: 75   Resp:    Temp:    SpO2:    .  Cathryn is comfortable. SVE unchanged. FHT cat 1. Continue titrating pitocin as tolerated.    Discussed w Dr. Blanca Shaffer MD 8/9/2024 12:31 AM

## 2024-08-09 NOTE — OB LABOR/OXYTOCIN SAFETY PROGRESS
Oxytocin Safety Progress Check Note - Cathryn Craig 34 y.o. female MRN: [id number]    Unit/Bed#: -01 Encounter: [id number]    Dose (lizz-units/min) Oxytocin: 18 lizz-units/min  Contraction Frequency (minutes): 2-5  Contraction Intensity: Moderate  Uterine Activity Characteristics: Irregular  Cervical Dilation: 3-4        Cervical Effacement: 70  Fetal Station: -2  Baseline Rate (FHR): 140 bpm  Fetal Heart Rate (FHT): 145 BPM  FHR Category: 1               Vital Signs:   Vitals:    08/09/24 0816   BP: 120/81   Pulse: 75   Resp:    Temp:    SpO2:        Notes/comments:   Will defer AROM due to [place]. Plan to continue titrating pit.     D/w Dr Rosemarie Sepulveda MD 8/9/2024 8:27 AM

## 2024-08-09 NOTE — OB LABOR/OXYTOCIN SAFETY PROGRESS
Oxytocin Safety Progress Check Note - Cathryn Craig 34 y.o. female MRN: [id number]    Unit/Bed#: -01 Encounter: [id number]    Dose (lizz-units/min) Oxytocin: 2 lizz-units/min  Contraction Frequency (minutes): 3-7  Contraction Intensity: Mild  Uterine Activity Characteristics: Irregular  Cervical Dilation: 3-4        Cervical Effacement: 70  Fetal Station: -2  Baseline Rate (FHR): 135 bpm  Fetal Heart Rate (FHT): 130 BPM  FHR Category: 1           Vital Signs:   Vitals:    08/08/24 2206   BP: 134/81   Pulse: 81   Resp:    Temp:    SpO2:        Notes/comments:   Pt is resting comfortably. SVE deferred. FHT cat 1 uzma irregularly. Will continue titrating pitocin. Will plan for SVE in 2h.    Dr. Rios aware     Kirstin Pavon MD 8/8/2024 10:22 PM

## 2024-08-09 NOTE — OB LABOR/OXYTOCIN SAFETY PROGRESS
Oxytocin Safety Progress Check Note - Cathryn Craig 34 y.o. female MRN: [id number]    Unit/Bed#: -01 Encounter: [id number]    Dose (lizz-units/min) Oxytocin: 22 lizz-units/min  Contraction Frequency (minutes): 2-5  Contraction Intensity: Moderate  Uterine Activity Characteristics: Irregular  Cervical Dilation: 5        Cervical Effacement: 90  Fetal Station: -1  Baseline Rate (FHR): 135 bpm  Fetal Heart Rate (FHT): 129 BPM  FHR Category: Cat II - recent isolated deceleration to the 90s for 3 minutes with resolution after repositioning. Now Cat 1            Vital Signs:   Vitals:    08/09/24 1342   BP: 125/76   Pulse: 74   Resp:    Temp:    SpO2:        Notes/comments:   Cathryn is comfortable with her epidural. Cervical exam is changed. Will continue ptiocin titration. Will continue to monitor and reassess as indicated.     Ana Houston MD 8/9/2024 1:58 PM

## 2024-08-09 NOTE — OB LABOR/OXYTOCIN SAFETY PROGRESS
Oxytocin Safety Progress Check Note - Cathryn Craig 34 y.o. female MRN: [id number]    Unit/Bed#: -01 Encounter: [id number]    Dose (lizz-units/min) Oxytocin: 22 lizz-units/min  Contraction Frequency (minutes): 1.5-3  Contraction Intensity: Moderate  Uterine Activity Characteristics: Regular  Cervical Dilation: 9        Cervical Effacement: 90  Fetal Station: 0  Baseline Rate (FHR): 135 bpm  Fetal Heart Rate (FHT): 129 BPM  FHR Category: Cat 1               Vital Signs:   Vitals:    08/09/24 1742   BP: 131/84   Pulse: 79   Resp:    Temp:    SpO2:        Notes/comments:   Cathryn is comfortable with her epidural. Cervical exam is changed. Baby in ROT (almost BALA) position. Will reposition with peanut ball and await full dilation. Will continue to monitor and reassess as indicated.      Ana Houston MD 8/9/2024 5:59 PM

## 2024-08-09 NOTE — PLAN OF CARE
Problem: PAIN - ADULT  Goal: Verbalizes/displays adequate comfort level or baseline comfort level  Description: Interventions:  - Encourage patient to monitor pain and request assistance  - Assess pain using appropriate pain scale  - Administer analgesics based on type and severity of pain and evaluate response  - Implement non-pharmacological measures as appropriate and evaluate response  - Consider cultural and social influences on pain and pain management  - Notify physician/advanced practitioner if interventions unsuccessful or patient reports new pain  Outcome: Progressing     Problem: INFECTION - ADULT  Goal: Absence or prevention of progression during hospitalization  Description: INTERVENTIONS:  - Assess and monitor for signs and symptoms of infection  - Monitor lab/diagnostic results  - Monitor all insertion sites, i.e. indwelling lines, tubes, and drains  - Monitor endotracheal if appropriate and nasal secretions for changes in amount and color  - [place] appropriate cooling/warming therapies per order  - Administer medications as ordered  - Instruct and encourage patient and family to use good hand hygiene technique  - Identify and instruct in appropriate isolation precautions for identified infection/condition  Outcome: Progressing  Goal: Absence of fever/infection during neutropenic period  Description: INTERVENTIONS:  - Monitor WBC    Outcome: Progressing     Problem: SAFETY ADULT  Goal: Patient will remain free of falls  Description: INTERVENTIONS:  - Educate patient/family on patient safety including physical limitations  - Instruct patient to call for assistance with activity   - Consult OT/PT to assist with strengthening/mobility   - Keep Call bell within reach  - Keep bed low and locked with side rails adjusted as appropriate  - Keep care items and personal belongings within reach  - Initiate and maintain comfort rounds  - Consider moving patient to room near nurses station  Outcome:  Progressing  Goal: Maintain or return to baseline ADL function  Description: INTERVENTIONS:  -  Assess patient's ability to carry out ADLs; assess patient's baseline for ADL function and identify physical deficits which impact ability to perform ADLs (bathing, care of mouth/teeth, toileting, grooming, dressing, etc.)  - Assess/evaluate cause of self-care deficits   - Assess range of motion  - Assess patient's mobility; develop plan if impaired  - Assess patient's need for assistive devices and provide as appropriate  - Encourage maximum independence but intervene and supervise when necessary  - Involve family in performance of ADLs  - Assess for home care needs following discharge   - Consider OT consult to assist with ADL evaluation and planning for discharge  - Provide patient education as appropriate  Outcome: Progressing  Goal: Maintains/Returns to pre admission functional level  Description: INTERVENTIONS:  - Perform AM-PAC 6 Click Basic Mobility/ Daily Activity assessment daily.  - Set and communicate daily mobility goal to care team and patient/family/caregiver.   - Collaborate with rehabilitation services on mobility goals if consulted  - Out of bed for toileting  - Record patient progress and toleration of activity level   Outcome: Progressing     Problem: DISCHARGE PLANNING  Goal: Discharge to home or other facility with appropriate resources  Description: INTERVENTIONS:  - Identify barriers to discharge w/patient and caregiver  - Arrange for needed discharge resources and transportation as appropriate  - Identify discharge learning needs (meds, wound care, etc.)  - Arrange for interpretive services to assist at discharge as needed  - Refer to Case Management Department for coordinating discharge planning if the patient needs post-hospital services based on physician/advanced practitioner order or complex needs related to functional status, cognitive ability, or social support system  Outcome:  Progressing     Problem: Knowledge Deficit  Goal: Patient/family/caregiver demonstrates understanding of disease process, treatment plan, medications, and discharge instructions  Description: Complete learning assessment and assess knowledge base.  Interventions:  - Provide teaching at level of understanding  - Provide teaching via preferred learning methods  Outcome: Progressing  Goal: Verbalizes understanding of labor plan  Description: Assess patient/family/caregiver's baseline knowledge level and ability to understand information.  Provide education via patient/family/caregiver's preferred learning method at appropriate level of understanding.     1. Provide teaching at level of understanding.  2. Provide teaching via preferred learning method(s).  Outcome: Progressing     Problem: Labor & Delivery  Goal: Manages discomfort  Description: Assess and monitor for signs and symptoms of discomfort.  Assess patient's pain level regularly and per hospital policy.  Administer medications as ordered. Support use of nonpharmacological methods to help control pain such as distraction, imagery, relaxation, and application of heat and cold.  Collaborate with interdisciplinary team and patient to determine appropriate pain management plan.    1. Include patient in decisions related to comfort.  2. Offer non-pharmacological pain management interventions.  3. Report ineffective pain management to physician.  Outcome: Progressing  Goal: Patient vital signs are stable  Description: 1. Assess vital signs - vaginal delivery.  Outcome: Progressing

## 2024-08-09 NOTE — ANESTHESIA PROCEDURE NOTES
Epidural Block    Patient location during procedure: OB/L&D  Start time: 8/9/2024 10:34 AM  Reason for block: procedure for pain  Staffing  Performed by: Lonnie Colon MD  Authorized by: Lonnie Colon MD    Preanesthetic Checklist  Completed: patient identified, IV checked, site marked, risks and benefits discussed, surgical consent, monitors and equipment checked, pre-op evaluation and timeout performed  Epidural  Patient position: sitting  Prep: ChloraPrep  Sedation Level: no sedation  Patient monitoring: frequent blood pressure checks, continuous pulse oximetry and heart rate  Approach: midline  Location: lumbar, L3-4  Injection technique: GERMAN saline  Needle  Needle type: Tuohy   Needle gauge: 17 G  Needle insertion depth: 5 cm  Catheter type: multi-orifice  Catheter size: 20 G  Catheter at skin depth: 10 cm  Catheter securement method: stabilization device and clear occlusive dressing  Test dose: negative  Assessment  Sensory level: T10  Number of attempts: 1negative aspiration for CSF, negative aspiration for heme and no paresthesia on injection  patient tolerated the procedure well with no immediate complications

## 2024-08-09 NOTE — PLAN OF CARE
Problem: PAIN - ADULT  Goal: Verbalizes/displays adequate comfort level or baseline comfort level  Description: Interventions:  - Encourage patient to monitor pain and request assistance  - Assess pain using appropriate pain scale  - Administer analgesics based on type and severity of pain and evaluate response  - Implement non-pharmacological measures as appropriate and evaluate response  - Consider cultural and social influences on pain and pain management  - Notify physician/advanced practitioner if interventions unsuccessful or patient reports new pain  Outcome: Progressing     Problem: INFECTION - ADULT  Goal: Absence or prevention of progression during hospitalization  Description: INTERVENTIONS:  - Assess and monitor for signs and symptoms of infection  - Monitor lab/diagnostic results  - Monitor all insertion sites, i.e. indwelling lines, tubes, and drains  - Monitor endotracheal if appropriate and nasal secretions for changes in amount and color  - [place] appropriate cooling/warming therapies per order  - Administer medications as ordered  - Instruct and encourage patient and family to use good hand hygiene technique  - Identify and instruct in appropriate isolation precautions for identified infection/condition  Outcome: Progressing  Goal: Absence of fever/infection during neutropenic period  Description: INTERVENTIONS:  - Monitor WBC    Outcome: Progressing     Problem: SAFETY ADULT  Goal: Patient will remain free of falls  Description: INTERVENTIONS:  - Educate patient/family on patient safety including physical limitations  - Instruct patient to call for assistance with activity   - Consult OT/PT to assist with strengthening/mobility   - Keep Call bell within reach  - Keep bed low and locked with side rails adjusted as appropriate  - Keep care items and personal belongings within reach  - Initiate and maintain comfort rounds  Outcome: Progressing  Goal: Maintain or return to baseline ADL  function  Description: INTERVENTIONS:  -  Assess patient's ability to carry out ADLs; assess patient's baseline for ADL function and identify physical deficits which impact ability to perform ADLs (bathing, care of mouth/teeth, toileting, grooming, dressing, etc.)  - Assess/evaluate cause of self-care deficits   - Assess range of motion  - Assess patient's mobility; develop plan if impaired  - Assess patient's need for assistive devices and provide as appropriate  - Encourage maximum independence but intervene and supervise when necessary  - Involve family in performance of ADLs  - Assess for home care needs following discharge   - Consider OT consult to assist with ADL evaluation and planning for discharge  - Provide patient education as appropriate  Outcome: Progressing  Goal: Maintains/Returns to pre admission functional level  Description: INTERVENTIONS:  - Perform AM-PAC 6 Click Basic Mobility/ Daily Activity assessment daily.  - Set and communicate daily mobility goal to care team and patient/family/caregiver.   - Collaborate with rehabilitation services on mobility goals if consulted  - Out of bed for toileting  - Record patient progress and toleration of activity level   Outcome: Progressing     Problem: DISCHARGE PLANNING  Goal: Discharge to home or other facility with appropriate resources  Description: INTERVENTIONS:  - Identify barriers to discharge w/patient and caregiver  - Arrange for needed discharge resources and transportation as appropriate  - Identify discharge learning needs (meds, wound care, etc.)  - Arrange for interpretive services to assist at discharge as needed  - Refer to Case Management Department for coordinating discharge planning if the patient needs post-hospital services based on physician/advanced practitioner order or complex needs related to functional status, cognitive ability, or social support system  Outcome: Progressing     Problem: Knowledge Deficit  Goal:  Patient/family/caregiver demonstrates understanding of disease process, treatment plan, medications, and discharge instructions  Description: Complete learning assessment and assess knowledge base.  Interventions:  - Provide teaching at level of understanding  - Provide teaching via preferred learning methods  Outcome: Progressing  Goal: Verbalizes understanding of labor plan  Description: Assess patient/family/caregiver's baseline knowledge level and ability to understand information.  Provide education via patient/family/caregiver's preferred learning method at appropriate level of understanding.     1. Provide teaching at level of understanding.  2. Provide teaching via preferred learning method(s).  Outcome: Progressing     Problem: Labor & Delivery  Goal: Manages discomfort  Description: Assess and monitor for signs and symptoms of discomfort.  Assess patient's pain level regularly and per hospital policy.  Administer medications as ordered. Support use of nonpharmacological methods to help control pain such as distraction, imagery, relaxation, and application of heat and cold.  Collaborate with interdisciplinary team and patient to determine appropriate pain management plan.    1. Include patient in decisions related to comfort.  2. Offer non-pharmacological pain management interventions.  3. Report ineffective pain management to physician.  Outcome: Progressing  Goal: Patient vital signs are stable  Description: 1. Assess vital signs - vaginal delivery.  Outcome: Progressing

## 2024-08-09 NOTE — OB LABOR/OXYTOCIN SAFETY PROGRESS
Oxytocin Safety Progress Check Note - Cathryn Craig 34 y.o. female MRN: [id number]    Unit/Bed#: -01 Encounter: [id number]    Dose (lizz-units/min) Oxytocin: 8 lizz-units/min  Contraction Frequency (minutes): 2-2.5  Contraction Intensity: Mild/Moderate  Uterine Activity Characteristics: Irregular  Cervical Dilation: 3-4        Cervical Effacement: 70  Fetal Station: -2  Baseline Rate (FHR): 130 bpm  Fetal Heart Rate (FHT): 132 BPM  FHR Category:              Vital Signs:   Vitals:    08/09/24 0042   BP: 126/78   Pulse:    Resp:    Temp:    SpO2:      Cathryn is feeling contractions more often. SVE unchanged. FHT cat 1. Continue titrating pitocin as tolerated.    Smiley Shaffer MD 8/9/2024 4:01 AM

## 2024-08-09 NOTE — OB LABOR/OXYTOCIN SAFETY PROGRESS
Oxytocin Safety Progress Check Note - Cathryn Craig 34 y.o. female MRN: [id number]    Unit/Bed#: -01 Encounter: [id number]    Dose (lizz-units/min) Oxytocin: 22 lizz-units/min  Contraction Frequency (minutes): 2-4  Contraction Intensity: Strong  Uterine Activity Characteristics: Regular  Cervical Dilation: Lip/rim (Comment)        Cervical Effacement: 90  Fetal Station: 0  Baseline Rate (FHR): 135 bpm  Fetal Heart Rate (FHT): 129 BPM  FHR Category: cat 1               Vital Signs:   Vitals:    08/09/24 1857   BP: 120/71   Pulse: 83   Resp: 20   Temp: 99.5 °F (37.5 °C)   SpO2:        Notes/comments:   Cathryn is feeling pressure. Her cervical exam is changed but there is still cervix on her right side. Will continue to monitor and reassess as indicated.       Ana Houston MD 8/9/2024 7:03 PM

## 2024-08-09 NOTE — ANESTHESIA PREPROCEDURE EVALUATION
"Procedure:  LABOR ANALGESIA    Relevant Problems   ANESTHESIA (within normal limits)      CARDIO (within normal limits)      ENDO (within normal limits)      GI/HEPATIC (within normal limits)      /RENAL (within normal limits)      GYN   (+) 40 weeks gestation of pregnancy      HEMATOLOGY (within normal limits)      NEURO/PSYCH (within normal limits)      PULMONARY (within normal limits)      Lab Results   Component Value Date    WBC 12.97 (H) 08/08/2024    HGB 13.6 08/08/2024    HCT 39.9 08/08/2024    MCV 93 08/08/2024     08/08/2024     No results found for: \"SODIUM\", \"K\", \"CL\", \"CO2\", \"BUN\", \"CREATININE\", \"GLUC\", \"CALCIUM\"  No results found for: \"INR\", \"PROTIME\"  No results found for: \"HGBA1C\"       Physical Exam    Airway    Mallampati score: II         Dental   No notable dental hx     Cardiovascular  Cardiovascular exam normal    Pulmonary  Pulmonary exam normal     Other Findings  post-pubertal.      Anesthesia Plan  ASA Score- 2     Anesthesia Type- epidural with ASA Monitors.         Additional Monitors:     Airway Plan:            Plan Factors-Exercise tolerance (METS): >4 METS.    Chart reviewed.   Existing labs reviewed. Patient summary reviewed.                  Induction-     Postoperative Plan-     Perioperative Resuscitation Plan - Level 1 - Full Code.       Informed Consent- Anesthetic plan and risks discussed with patient.  I personally reviewed this patient with the CRNA. Discussed and agreed on the Anesthesia Plan with the CRNA..        "

## 2024-08-10 LAB — HOLD SPECIMEN: NORMAL

## 2024-08-10 PROCEDURE — 99024 POSTOP FOLLOW-UP VISIT: CPT | Performed by: OBSTETRICS & GYNECOLOGY

## 2024-08-10 RX ADMIN — HYDROCORTISONE 1 APPLICATION: 1 CREAM TOPICAL at 12:03

## 2024-08-10 RX ADMIN — ACETAMINOPHEN 975 MG: 325 TABLET, FILM COATED ORAL at 09:26

## 2024-08-10 RX ADMIN — IBUPROFEN 600 MG: 600 TABLET, FILM COATED ORAL at 11:57

## 2024-08-10 RX ADMIN — WITCH HAZEL 1 PAD: 500 SOLUTION RECTAL; TOPICAL at 12:03

## 2024-08-10 RX ADMIN — DOCUSATE SODIUM 100 MG: 100 CAPSULE, LIQUID FILLED ORAL at 09:25

## 2024-08-10 RX ADMIN — BENZOCAINE AND LEVOMENTHOL 1 APPLICATION: 200; 5 SPRAY TOPICAL at 12:03

## 2024-08-10 RX ADMIN — DOCUSATE SODIUM 100 MG: 100 CAPSULE, LIQUID FILLED ORAL at 17:26

## 2024-08-10 RX ADMIN — IBUPROFEN 600 MG: 600 TABLET, FILM COATED ORAL at 06:18

## 2024-08-10 RX ADMIN — IBUPROFEN 600 MG: 600 TABLET, FILM COATED ORAL at 17:26
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
## 2024-08-10 NOTE — L&D DELIVERY NOTE
DELIVERY NOTE  Cathryn Craig 34 y.o. female MRN: [id number]  Unit/Bed#: -01 Encounter: [id number]    Obstetrician:    Dr. Ana Houston MD    Assistant:   Dr. Shaun Wall MD    Pre-Delivery Diagnosis:   40 weeks gestation of pregnancy  Alcohol use in pregnancy  Drug use in pregnancy  At risk for IPV    Post-Delivery Diagnosis:   Same as above - Delivered  Viable male fetus  Labial tear    Procedure:  Spontaneous vaginal delivery  Repair labial spontaneous laceration      Findings:  1. Viable male  delivered on 24 with weight pending at time of dictation,  Apgar scores of 8 at one minute and 9 at five minutes.   2. Spontaneous delivery of placenta with centrally  inserted 3-vessel cord  3. Clear amniotic fluid initially but terminal meconium on delivery  4. Bilateral labial lacerations; Left hemostatic; Right side repaired with 4-0 vicryl     Specimens:   Cord blood obtained   Placenta; normal appearing, central insertion, intact   Arterial and venous blood gases (below)     Gases:  Umbilical Cord Venous Blood Gas:  Results from last 7 days   Lab Units 24   PH COV  7.230   PCO2 COV mm HG 50.8*   HCO3 COV mmol/L 20.8   BASE EXC COV mmol/L -7.2*   O2 CT CD VB mL/dL 9.9   O2 HGB, VENOUS CORD % 39.8     Umbilical Cord Arterial Blood Gas:  Results from last 7 days   Lab Units 24   PH COA  7.147*   PCO2 COA  61.2*   PO2 COA mm HG 27.7*   HCO3 COA mmol/L 20.7   BASE EXC COA mmol/L -9.5*   O2 CONTENT CORD ART ml/dl 12.7   O2 HGB, ARTERIAL CORD % 48.4       Quantitative Blood Loss:   255 mL           Complications:    None       Brief labor course:   Cathryn is a 35yo now  at 40w5d who was admitted yesterday for induction of labor due to decreased fetal movement and oligohydramnios at term gestation. She was started on pitocin titration. She progressed and she was made comfortable with an epidural. Membranes spontaneously ruptured. She progressed to complete and  began pushing.      Procedure Details      Description of procedure     After pushing for 14 minutes, on 24 at 2129 patient delivered a viable male , Apgars of 8 (1 min) and 9 (5 min). The fetal vertex delivered spontaneously. There was no nuchal cord. With the assistance of maternal expulsive efforts and gentle downward traction of the fetal head, the anterior shoulder was delivered without difficulty, followed by the remainder of the infant's body and contralateral arm.     After delivery of the , delayed clamping of the umbilical cord was undertaken for 30 seconds. The  was noted to have good tone and cry spontaneously. There was no apparent injury to the . The cord was then doubly clamped and cut and the  was passed off to  staff for routine care. Umbilical cord blood and umbilical artery and venous gases were collected. Placenta was delivered with fundal massage and gentle traction on the cord with active management of the third stage of labor. Placenta delivered intact with a 3-vessel cord. Active management of the third stage of labor was undertaken with IV pitocin at 250milliunits/min. A bimanual exam was performed and trailing membranes were removed. Bleeding was noted to be under control.     Outcome:  Living  with APGARS 8, 9 at 1 and 5 minutes, respectively.     weight pending    Perineal Inspection/Laceration Repair    The vagina, cervix, perineum, and rectum were inspected and there was noted to be bilateral labial lacerations. The left labial laceration was hemostatic but there was slight oozing from the right labial laceration. Therefore, it was repaired with 4-0 Vicryl in running fashion. Excellent hemostasis and cosmesis was achieved.     Conclusion:  Mother and baby are currently recovering nicely in stable condition.    Ana Houston MD  OB/GYN  2024  10:17 PM

## 2024-08-10 NOTE — ANESTHESIA POSTPROCEDURE EVALUATION
Post-Op Assessment Note    CV Status:  Stable    Pain management: adequate      Post-op block assessment: site cleaned, catheter intact and no complications   Mental Status:  Alert and awake   Hydration Status:  Euvolemic   PONV Controlled:  Controlled   Airway Patency:  Patent     Post Op Vitals Reviewed: Yes    No anethesia notable event occurred.    Staff: Anesthesiologist, CRNA           BP      Temp      Pulse     Resp      SpO2

## 2024-08-10 NOTE — PLAN OF CARE
Problem: PAIN - ADULT  Goal: Verbalizes/displays adequate comfort level or baseline comfort level  Description: Interventions:  - Encourage patient to monitor pain and request assistance  - Assess pain using appropriate pain scale  - Administer analgesics based on type and severity of pain and evaluate response  - Implement non-pharmacological measures as appropriate and evaluate response  - Consider cultural and social influences on pain and pain management  - Notify physician/advanced practitioner if interventions unsuccessful or patient reports new pain  Outcome: Progressing     Problem: INFECTION - ADULT  Goal: Absence or prevention of progression during hospitalization  Description: INTERVENTIONS:  - Assess and monitor for signs and symptoms of infection  - Monitor lab/diagnostic results  - Monitor all insertion sites, i.e. indwelling lines, tubes, and drains  - Monitor endotracheal if appropriate and nasal secretions for changes in amount and color  - [place] appropriate cooling/warming therapies per order  - Administer medications as ordered  - Instruct and encourage patient and family to use good hand hygiene technique  - Identify and instruct in appropriate isolation precautions for identified infection/condition  Outcome: Progressing  Goal: Absence of fever/infection during neutropenic period  Description: INTERVENTIONS:  - Monitor WBC    Outcome: Progressing     Problem: SAFETY ADULT  Goal: Patient will remain free of falls  Description: INTERVENTIONS:  - Educate patient/family on patient safety including physical limitations  - Instruct patient to call for assistance with activity   - Consult OT/PT to assist with strengthening/mobility   - Keep Call bell within reach  - Keep bed low and locked with side rails adjusted as appropriate  - Keep care items and personal belongings within reach  - Initiate and maintain comfort rounds  - Make Fall Risk Sign visible to staff  - Offer Toileting every  Hours,  in advance of need  - Initiate/Maintain alarm  - Obtain necessary fall risk management equipment:   - Apply yellow socks and bracelet for high fall risk patients  - Consider moving patient to room near nurses station  Outcome: Progressing  Goal: Maintain or return to baseline ADL function  Description: INTERVENTIONS:  -  Assess patient's ability to carry out ADLs; assess patient's baseline for ADL function and identify physical deficits which impact ability to perform ADLs (bathing, care of mouth/teeth, toileting, grooming, dressing, etc.)  - Assess/evaluate cause of self-care deficits   - Assess range of motion  - Assess patient's mobility; develop plan if impaired  - Assess patient's need for assistive devices and provide as appropriate  - Encourage maximum independence but intervene and supervise when necessary  - Involve family in performance of ADLs  - Assess for home care needs following discharge   - Consider OT consult to assist with ADL evaluation and planning for discharge  - Provide patient education as appropriate  Outcome: Progressing  Goal: Maintains/Returns to pre admission functional level  Description: INTERVENTIONS:  - Perform AM-PAC 6 Click Basic Mobility/ Daily Activity assessment daily.  - Set and communicate daily mobility goal to care team and patient/family/caregiver.   - Collaborate with rehabilitation services on mobility goals if consulted  - Perform Range of Motion  times a day.  - Reposition patient every  hours.  - Dangle patient  times a day  - Stand patient  times a day  - Ambulate patient  times a day  - Out of bed to chair  times a day   - Out of bed for meals  times a day  - Out of bed for toileting  - Record patient progress and toleration of activity level   Outcome: Progressing     Problem: DISCHARGE PLANNING  Goal: Discharge to home or other facility with appropriate resources  Description: INTERVENTIONS:  - Identify barriers to discharge w/patient and caregiver  - Arrange for  needed discharge resources and transportation as appropriate  - Identify discharge learning needs (meds, wound care, etc.)  - Arrange for interpretive services to assist at discharge as needed  - Refer to Case Management Department for coordinating discharge planning if the patient needs post-hospital services based on physician/advanced practitioner order or complex needs related to functional status, cognitive ability, or social support system  Outcome: Progressing     Problem: Knowledge Deficit  Goal: Patient/family/caregiver demonstrates understanding of disease process, treatment plan, medications, and discharge instructions  Description: Complete learning assessment and assess knowledge base.  Interventions:  - Provide teaching at level of understanding  - Provide teaching via preferred learning methods  Outcome: Progressing  Goal: Verbalizes understanding of labor plan  Description: Assess patient/family/caregiver's baseline knowledge level and ability to understand information.  Provide education via patient/family/caregiver's preferred learning method at appropriate level of understanding.     1. Provide teaching at level of understanding.  2. Provide teaching via preferred learning method(s).  Outcome: Progressing     Problem: Labor & Delivery  Goal: Manages discomfort  Description: Assess and monitor for signs and symptoms of discomfort.  Assess patient's pain level regularly and per hospital policy.  Administer medications as ordered. Support use of nonpharmacological methods to help control pain such as distraction, imagery, relaxation, and application of heat and cold.  Collaborate with interdisciplinary team and patient to determine appropriate pain management plan.    1. Include patient in decisions related to comfort.  2. Offer non-pharmacological pain management interventions.  3. Report ineffective pain management to physician.  Outcome: Progressing  Goal: Patient vital signs are  stable  Description: 1. Assess vital signs - vaginal delivery.  Outcome: Progressing

## 2024-08-10 NOTE — OB LABOR/OXYTOCIN SAFETY PROGRESS
Oxytocin Safety Progress Check Note - Cathryn Craig 34 y.o. female MRN: [id number]    Unit/Bed#: -01 Encounter: [id number]    Dose (lizz-units/min) Oxytocin: 22 lizz-units/min  Contraction Frequency (minutes): 2-4  Contraction Intensity: Moderate/Strong  Uterine Activity Characteristics: Regular  Cervical Dilation: Lip/rim (Comment)        Cervical Effacement: 90  Fetal Station: 1  Baseline Rate (FHR): 135 bpm  Fetal Heart Rate (FHT): 145 BPM  FHR Category: I             Vital Signs:   Vitals:    24   BP: 121/77   Pulse: 94   Resp:    Temp:    SpO2:      Notes/comments:   Pt feeling increased pressure. FHR Category I. Speculator with contractions q 2-3min. SVE as above. Pit running at 22, continue to titrate to contractions. Will recheck in 2 hours or sooner if clinically indicated. Anticipate . Attending physician Dr. Rosemarie aceves.     Shaun Wall MD 2024 8:21 PM
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
## 2024-08-10 NOTE — LACTATION NOTE
This note was copied from a baby's chart.  CONSULT - LACTATION  Baby Boy Craig (Nicole) 1 days male MRN: [id number]    [facility] AN NURSERY Room / Bed: (N)/(N) Encounter: [id number]    Maternal Information     MOTHER:  Cathryn Craig  Maternal Age: 34 y.o.  OB History: # 1 - Date: , Sex: None, Weight: None, GA: 5w0d, Type: None, Apgar1: None, Apgar5: None, Living: None, Birth Comments: None    # 2 - Date: 24, Sex: Male, Weight: 3655 g (8 lb 0.9 oz), GA: 40w5d, Type: Vaginal, Spontaneous, Apgar1: 8, Apgar5: 9, Living: Living, Birth Comments: None   Previouse breast reduction surgery? No    Lactation history:   Has patient previously breast fed: No   How long had patient previously breast fed:     Previous breast feeding complications:       Past Surgical History:   Procedure Laterality Date    REMOVAL OF INTRAUTERINE DEVICE (IUD)      5 year ago       Birth information:  YOB: 2026   Time of birth: 9:29 PM   Sex: male   Delivery type: Vaginal, Spontaneous   Birth Weight: 3655 g (8 lb 0.9 oz)   Percent of Weight Change: 0%     Gestational Age: 40w5d   [unfilled]    Assessment     Breast and nipple assessment: normal assessment     Assessment: normal assessment    Feeding assessment: feeding well  LATCH:  Latch: Grasps breast, tongue down, lips flanged, rhythmic sucking   Audible Swallowing: Spontaneous and intermittent (24 hours old)   Type of Nipple: Everted (After stimulation)   Comfort (Breast/Nipple): Soft/non-tender   Hold (Positioning): Partial assist, teach one side, mother does other, staff holds   LATCH Score: 9           08/10/24 1400   Lactation Consultation   Reason for Consult 20;20 min   Maternal Information   Has mother  before? No   LATCH Documentation   Latch 2   Audible Swallowing 2   Type of Nipple 2   Comfort (Breast/Nipple) 2   Hold (Positioning) 1   LATCH Score 9   Having latch problems? No   Position(s)  Used Football   Breasts/Nipples   Left Breast Soft   Right Breast Soft   Left Nipple Everted   Right Nipple Everted   Breastfeeding Progress Not yet established   Breast Pump   Pump 3  (Zomee Z2)   Patient Follow-Up   Lactation Consult Status 2   Follow-Up Type Inpatient;Call as needed   Other OB Lactation Documentation    Additional Problem Noted Assisted mom in latching baby to breast in football hold on left breast.  Baby latched with minimal assistance. Baby actively sucking with swallows. Reviewed proper position and alignment for deep latch. Reviewed hunger/fullness cues. Reviewed babies bellies and milk amounts. Enc to call for further assistance  (RSB booklet reviewed, DC booklet at bedside)       Feeding recommendations:  breast feed on demand    Provided demonstration, education and support of deep latch to breast by placing the nipple to the nose, dragging down to chin to achieve a wide latch. Bring baby to the breast, not breast to baby. Move your shoulders down and away from your ears. Look for ear, shoulder, hip alignment. Baby's upper and lower lip should be flanged on the breast.    Met with mother. Provided mother with Ready, Set, Baby booklet which contained information on:  Hand expression with access to QR codes to review hand expression.  Positioning and latch reviewed as well as showing images of other feeding positions.  Discussed the properties of a good latch in any position.   Feeding on cue and what that means for recognizing infant's hunger, s/s that baby is getting enough milk and some s/s that breastfeeding dyad may need further help  Skin to Skin contact an benefits to mom and baby  Avoidance of pacifiers for the first month discussed.   Gave information on common concerns, what to expect the first few weeks after delivery, preparing for other caregivers, and how partners can help. Resources for support also provided.        Miranda Armenta MA 8/10/2024 2:21 PM

## 2024-08-10 NOTE — PROGRESS NOTES
"Progress Note - OB/GYN  Cathryn Craig 34 y.o. female MRN: [id number]  Unit/Bed#:  323-01 Encounter: [id number]    Assessment and Plan   Cathryn Craig is a patient of: [facility] . She is PPD# 1 s/p spontaneous vaginal delivery. Recovering well and stable.    *  (spontaneous vaginal delivery)  Assessment & Plan  Patient progressing toward postpartum milestones.  - Continue routine postpartum care  - Pain management with oral analgesics  - Encourage breastfeeding, familial bonding      Disposition   - Anticipate discharge home on PPD1-2    Subjective/Objective   Chief Complaint: PPD1 s/p spontaneous vaginal delivery  Subjective:    Cathryn Criag has no current complaints. Pain is well controlled. She is currently voiding. She is currently passing flatus and has had no bowel movement. She is ambulating. She is tolerating PO and denies nausea or vomitting. She denies fever, chills, chest pain, shortness of breath, dizziness, calf tenderness. Lochia is normal. She is Breastfeeding.    Vitals:   BP 91/54 (BP Location: Right arm)   Pulse 77   Temp 98.2 °F (36.8 °C) (Oral)   Resp 16   Ht 5' 7\" (1.702 m)   Wt 90.3 kg (199 lb)   LMP 10/29/2023 (Approximate) Comment: LMP 10/29/2023  SpO2 96%   Breastfeeding Yes   BMI 31.17 kg/m²       Intake/Output Summary (Last 24 hours) at 8/10/2024 0851  Last data filed at 8/10/2024 0030  Gross per 24 hour   Intake 3733.3 ml   Output 2705 ml   Net 1028.3 ml     Invasive Devices       Peripheral Intravenous Line  Duration             Peripheral IV 24 Right;Ventral (anterior) Forearm 1 day              Epidural Line  Duration             Epidural Catheter 24 <1 day                  Physical Exam:   GEN: well-appearing, alert and oriented x3  CARDIO: regular rate  RESP: nonlabored respirations on room air  ABDOMEN: soft, nontender, nodistended, fundus firm below the umbilicus  EXTREMITIES: SCDs in place, nontender, no erythema, no edema    Labs: "   Hemoglobin   Date Value Ref Range Status   08/08/2024 13.6 11.5 - 15.4 g/dL Final     External Hemoglobin   Date Value Ref Range Status   01/17/2024 12.7 g/dL Final     WBC   Date Value Ref Range Status   08/08/2024 12.97 (H) 4.31 - 10.16 Thousand/uL Final     Platelets   Date Value Ref Range Status   08/08/2024 181 149 - 390 Thousands/uL Final     External Platelets   Date Value Ref Range Status   01/17/2024 287 K/µL Final        Shaun Wall MD  OBGYN PGY-1  08/10/24  8:51 AM

## 2024-08-11 VITALS
HEIGHT: 67 IN | HEART RATE: 90 BPM | SYSTOLIC BLOOD PRESSURE: 116 MMHG | OXYGEN SATURATION: 100 % | TEMPERATURE: 98.2 F | DIASTOLIC BLOOD PRESSURE: 64 MMHG | WEIGHT: 199 LBS | BODY MASS INDEX: 31.23 KG/M2 | RESPIRATION RATE: 20 BRPM

## 2024-08-11 PROBLEM — O41.00X0 OLIGOHYDRAMNIOS: Status: ACTIVE | Noted: 2024-08-11

## 2024-08-11 PROCEDURE — NC001 PR NO CHARGE: Performed by: STUDENT IN AN ORGANIZED HEALTH CARE EDUCATION/TRAINING PROGRAM

## 2024-08-11 PROCEDURE — 99024 POSTOP FOLLOW-UP VISIT: CPT | Performed by: STUDENT IN AN ORGANIZED HEALTH CARE EDUCATION/TRAINING PROGRAM

## 2024-08-11 RX ORDER — IBUPROFEN 600 MG/1
600 TABLET, FILM COATED ORAL EVERY 6 HOURS
Start: 2024-08-11

## 2024-08-11 RX ORDER — BENZOCAINE/MENTHOL 6 MG-10 MG
1 LOZENGE MUCOUS MEMBRANE DAILY PRN
Start: 2024-08-11

## 2024-08-11 RX ORDER — DOCUSATE SODIUM 100 MG/1
100 CAPSULE, LIQUID FILLED ORAL 2 TIMES DAILY
Start: 2024-08-11

## 2024-08-11 RX ORDER — ACETAMINOPHEN 325 MG/1
975 TABLET ORAL EVERY 6 HOURS
Start: 2024-08-11

## 2024-08-11 RX ADMIN — DOCUSATE SODIUM 100 MG: 100 CAPSULE, LIQUID FILLED ORAL at 09:23

## 2024-08-11 RX ADMIN — ACETAMINOPHEN 975 MG: 325 TABLET, FILM COATED ORAL at 09:23

## 2024-08-11 RX ADMIN — IBUPROFEN 600 MG: 600 TABLET, FILM COATED ORAL at 12:28

## 2024-08-11 NOTE — LACTATION NOTE
This note was copied from a baby's chart.  CONSULT - LACTATION  Baby Boy Craig (Nicole) 2 days male MRN: [id number]    [facility] AN NURSERY Room / Bed: (N)/(N) Encounter: [id number]    Maternal Information     MOTHER:  Cathryn Craig  Maternal Age: 34 y.o.  OB History: # 1 - Date: , Sex: None, Weight: None, GA: 5w0d, Type: None, Apgar1: None, Apgar5: None, Living: None, Birth Comments: None    # 2 - Date: 24, Sex: Male, Weight: 3655 g (8 lb 0.9 oz), GA: 40w5d, Type: Vaginal, Spontaneous, Apgar1: 8, Apgar5: 9, Living: Living, Birth Comments: None   Previouse breast reduction surgery? No    Lactation history:   Has patient previously breast fed: No   How long had patient previously breast fed:     Previous breast feeding complications:       Past Surgical History:   Procedure Laterality Date    REMOVAL OF INTRAUTERINE DEVICE (IUD)      5 year ago       Birth information:  YOB: 2026   Time of birth: 9:29 PM   Sex: male   Delivery type: Vaginal, Spontaneous   Birth Weight: 3655 g (8 lb 0.9 oz)   Percent of Weight Change: -5%     Gestational Age: 40w5d   [unfilled]    Assessment     Breast and nipple assessment:  bilateral tender nipples.      Assessment: normal assessment    Feeding assessment:  no feeding assessed at this time     24 1141   Lactation Consultation   Reason for Consult 20 minutes   Maternal Information   Has mother  before? No   Breasts/Nipples   Left Breast Soft   Right Breast Soft   Left Nipple Tender;Everted   Right Nipple Tender;Everted   Breastfeeding Progress Not yet established   Patient Follow-Up   Lactation Consult Status 2   Follow-Up Type Inpatient;Call as needed   Other OB Lactation Documentation    Additional Problem Noted Mom called out with questions. Reviewed proper position and alignment for deep latch. Reviewed cluster feedings with mom. Mom c/o sore nipples, no cracks or bleeding.  Reviewed ways to help heal. Enc to call for further assistance.  DC booklet reviewed.        Feeding recommendations:  breast feed on demand    Discussed 2nd night syndrome and ways to calm infant. Hand out given. Information on hand expression given. Discussed benefits of knowing how to manually express breast including stimulating milk supply, softening nipple for latch and evacuating breast in the event of engorgement.    C/O sore nipples.  Information given about sore nipples and how to correct with positioning techniques. Discussed maneuvers to latch infant on properly to avoid nipple pain and promote healing.  Discussed treatments that could be utilized to promote healing.     Met with mother to go over discharge breastfeeding booklet including the feeding log. Emphasized 8 or more (12) feedings in a 24 hour period, what to expect for the number of diapers per day of life and the progression of properties of the  stooling pattern.    List of reasons to call a lactation consultant.  Feeding logs  Feeding cues  Hand expression  Baby's Second day (cluster feeding)  Breastfeeding and Your Lifestyle (Medications, Alcohol, Caffeine, Smoking, Street Drugs, Methadone)  First Two Weeks Survival Guide for Breastfeeding  Breast Changes  Physical Therapy  Storage and Handling of Breast milk  How to Keep Your Breast Pump Kit Clean  The Employed Breastfeeding Mother  Mixed feeding  Bottle feeding like breastfeeding (paced bottle feeding)  astfeeding and your lifestyle, storage and preparation of breast milk, how to keep you breast pump clean, the employed breastfeeding mother and paced bottle feeding handouts.     Booklet included Breastfeeding Resources for after discharge including access to the number for the [facility].        Miranda Armenta MA 2024 11:43 AM

## 2024-08-11 NOTE — PLAN OF CARE
Problem: PAIN - ADULT  Goal: Verbalizes/displays adequate comfort level or baseline comfort level  Description: Interventions:  - Encourage patient to monitor pain and request assistance  - Assess pain using appropriate pain scale  - Administer analgesics based on type and severity of pain and evaluate response  - Implement non-pharmacological measures as appropriate and evaluate response  - Consider cultural and social influences on pain and pain management  - Notify physician/advanced practitioner if interventions unsuccessful or patient reports new pain  Outcome: Progressing     Problem: INFECTION - ADULT  Goal: Absence or prevention of progression during hospitalization  Description: INTERVENTIONS:  - Assess and monitor for signs and symptoms of infection  - Monitor lab/diagnostic results  - Monitor all insertion sites, i.e. indwelling lines, tubes, and drains  - Monitor endotracheal if appropriate and nasal secretions for changes in amount and color  - [place] appropriate cooling/warming therapies per order  - Administer medications as ordered  - Instruct and encourage patient and family to use good hand hygiene technique  - Identify and instruct in appropriate isolation precautions for identified infection/condition  Outcome: Progressing  Goal: Absence of fever/infection during neutropenic period  Description: INTERVENTIONS:  - Monitor WBC    Outcome: Progressing     Problem: SAFETY ADULT  Goal: Patient will remain free of falls  Description: INTERVENTIONS:  - Educate patient/family on patient safety including physical limitations  - Instruct patient to call for assistance with activity   - Consult OT/PT to assist with strengthening/mobility   - Keep Call bell within reach  - Keep bed low and locked with side rails adjusted as appropriate  - Keep care items and personal belongings within reach  - Initiate and maintain comfort rounds  - Make Fall Risk Sign visible to staff  - Apply yellow socks and bracelet  for high fall risk patients  - Consider moving patient to room near nurses station  Outcome: Progressing  Goal: Maintain or return to baseline ADL function  Description: INTERVENTIONS:  -  Assess patient's ability to carry out ADLs; assess patient's baseline for ADL function and identify physical deficits which impact ability to perform ADLs (bathing, care of mouth/teeth, toileting, grooming, dressing, etc.)  - Assess/evaluate cause of self-care deficits   - Assess range of motion  - Assess patient's mobility; develop plan if impaired  - Assess patient's need for assistive devices and provide as appropriate  - Encourage maximum independence but intervene and supervise when necessary  - Involve family in performance of ADLs  - Assess for home care needs following discharge   - Consider OT consult to assist with ADL evaluation and planning for discharge  - Provide patient education as appropriate  Outcome: Progressing  Goal: Maintains/Returns to pre admission functional level  Description: INTERVENTIONS:  - Perform AM-PAC 6 Click Basic Mobility/ Daily Activity assessment daily.  - Set and communicate daily mobility goal to care team and patient/family/caregiver.   - Collaborate with rehabilitation services on mobility goals if consulted  - Out of bed for toileting  - Record patient progress and toleration of activity level   Outcome: Progressing     Problem: DISCHARGE PLANNING  Goal: Discharge to home or other facility with appropriate resources  Description: INTERVENTIONS:  - Identify barriers to discharge w/patient and caregiver  - Arrange for needed discharge resources and transportation as appropriate  - Identify discharge learning needs (meds, wound care, etc.)  - Arrange for interpretive services to assist at discharge as needed  - Refer to Case Management Department for coordinating discharge planning if the patient needs post-hospital services based on physician/advanced practitioner order or complex needs  related to functional status, cognitive ability, or social support system  Outcome: Progressing     Problem: POSTPARTUM  Goal: Experiences normal postpartum course  Description: INTERVENTIONS:  - Monitor maternal vital signs  - Assess uterine involution and lochia  Outcome: Progressing  Goal: Appropriate maternal -  bonding  Description: INTERVENTIONS:  - Identify family support  - Assess for appropriate maternal/infant bonding   -Encourage maternal/infant bonding opportunities  - Referral to  or  as needed  Outcome: Progressing  Goal: Establishment of infant feeding pattern  Description: INTERVENTIONS:  - Assess breast/bottle feeding  - Refer to lactation as needed  Outcome: Progressing  Goal: Incision(s), wounds(s) or drain site(s) healing without S/S of infection  Description: INTERVENTIONS  - Assess and document dressing, incision, wound bed, drain sites and surrounding tissue  - Provide patient and family education  Outcome: Progressing

## 2024-08-11 NOTE — PLAN OF CARE
Problem: PAIN - ADULT  Goal: Verbalizes/displays adequate comfort level or baseline comfort level  Description: Interventions:  - Encourage patient to monitor pain and request assistance  - Assess pain using appropriate pain scale  - Administer analgesics based on type and severity of pain and evaluate response  - Implement non-pharmacological measures as appropriate and evaluate response  - Consider cultural and social influences on pain and pain management  - Notify physician/advanced practitioner if interventions unsuccessful or patient reports new pain  8/11/2024 1455 by Sissy Cee RN  Outcome: Completed  8/11/2024 1143 by Sissy Cee RN  Outcome: Progressing     Problem: INFECTION - ADULT  Goal: Absence or prevention of progression during hospitalization  Description: INTERVENTIONS:  - Assess and monitor for signs and symptoms of infection  - Monitor lab/diagnostic results  - Monitor all insertion sites, i.e. indwelling lines, tubes, and drains  - Monitor endotracheal if appropriate and nasal secretions for changes in amount and color  - [place] appropriate cooling/warming therapies per order  - Administer medications as ordered  - Instruct and encourage patient and family to use good hand hygiene technique  - Identify and instruct in appropriate isolation precautions for identified infection/condition  8/11/2024 1455 by Sissy Cee RN  Outcome: Completed  8/11/2024 1143 by Sissy Cee RN  Outcome: Progressing  Goal: Absence of fever/infection during neutropenic period  Description: INTERVENTIONS:  - Monitor WBC    8/11/2024 1455 by Sissy Cee RN  Outcome: Completed  8/11/2024 1143 by Sissy Cee RN  Outcome: Progressing     Problem: SAFETY ADULT  Goal: Patient will remain free of falls  Description: INTERVENTIONS:  - Educate patient/family on patient safety including physical limitations  - Instruct patient to call for assistance with activity   - Consult OT/PT to assist  with strengthening/mobility   - Keep Call bell within reach  - Keep bed low and locked with side rails adjusted as appropriate  - Keep care items and personal belongings within reach  - Initiate and maintain comfort rounds  - Make Fall Risk Sign visible to staff  - Offer Toileting every  Hours, in advance of need  - Initiate/Maintain alarm  - Obtain necessary fall risk management equipment:   - Apply yellow socks and bracelet for high fall risk patients  - Consider moving patient to room near nurses station  8/11/2024 1455 by Sissy Cee RN  Outcome: Completed  8/11/2024 1143 by Sissy Cee RN  Outcome: Progressing  Goal: Maintain or return to baseline ADL function  Description: INTERVENTIONS:  -  Assess patient's ability to carry out ADLs; assess patient's baseline for ADL function and identify physical deficits which impact ability to perform ADLs (bathing, care of mouth/teeth, toileting, grooming, dressing, etc.)  - Assess/evaluate cause of self-care deficits   - Assess range of motion  - Assess patient's mobility; develop plan if impaired  - Assess patient's need for assistive devices and provide as appropriate  - Encourage maximum independence but intervene and supervise when necessary  - Involve family in performance of ADLs  - Assess for home care needs following discharge   - Consider OT consult to assist with ADL evaluation and planning for discharge  - Provide patient education as appropriate  8/11/2024 1455 by Sissy Cee RN  Outcome: Completed  8/11/2024 1143 by Sissy Cee RN  Outcome: Progressing  Goal: Maintains/Returns to pre admission functional level  Description: INTERVENTIONS:  - Perform AM-PAC 6 Click Basic Mobility/ Daily Activity assessment daily.  - Set and communicate daily mobility goal to care team and patient/family/caregiver.   - Collaborate with rehabilitation services on mobility goals if consulted  - Perform Range of Motion  times a day.  - Reposition patient every  hours.  - Dangle patient  times a day  - Stand patient  times a day  - Ambulate patient  times a day  - Out of bed to chair  times a day   - Out of bed for meals times a day  - Out of bed for toileting  - Record patient progress and toleration of activity level   2024 by Sissy Cee RN  Outcome: Completed  2024 1143 by Sissy Cee RN  Outcome: Progressing     Problem: DISCHARGE PLANNING  Goal: Discharge to home or other facility with appropriate resources  Description: INTERVENTIONS:  - Identify barriers to discharge w/patient and caregiver  - Arrange for needed discharge resources and transportation as appropriate  - Identify discharge learning needs (meds, wound care, etc.)  - Arrange for interpretive services to assist at discharge as needed  - Refer to Case Management Department for coordinating discharge planning if the patient needs post-hospital services based on physician/advanced practitioner order or complex needs related to functional status, cognitive ability, or social support system  2024 by Sissy Cee RN  Outcome: Completed  2024 1143 by Sissy Cee RN  Outcome: Progressing     Problem: POSTPARTUM  Goal: Experiences normal postpartum course  Description: INTERVENTIONS:  - Monitor maternal vital signs  - Assess uterine involution and lochia  2024 by Sissy Cee RN  Outcome: Completed  2024 1143 by Sissy Cee RN  Outcome: Progressing  Goal: Appropriate maternal -  bonding  Description: INTERVENTIONS:  - Identify family support  - Assess for appropriate maternal/infant bonding   -Encourage maternal/infant bonding opportunities  - Referral to  or  as needed  2024 by Sissy Cee RN  Outcome: Completed  2024 1143 by Sissy Cee RN  Outcome: Progressing  Goal: Establishment of infant feeding pattern  Description: INTERVENTIONS:  - Assess breast/bottle feeding  - Refer to  lactation as needed  8/11/2024 1455 by Sissy Cee RN  Outcome: Completed  8/11/2024 1143 by Sissy Cee RN  Outcome: Progressing  Goal: Incision(s), wounds(s) or drain site(s) healing without S/S of infection  Description: INTERVENTIONS  - Assess and document dressing, incision, wound bed, drain sites and surrounding tissue  - Provide patient and family education  - Perform skin care/dressing changes every   8/11/2024 1455 by Sissy Cee RN  Outcome: Completed  8/11/2024 1143 by Sissy Cee RN  Outcome: Progressing     Problem: ALTERATION IN THE BREAST  Goal: Optimize infant feeding at the breast  Description: INTERVENTIONS:  - Latch, breast and nipple assessment  - Assess prior breast feeding history  - Hand expression of breast milk  - For cracked, bleeding and or sore nipples reassess latch, treat damaged nipple  -Educate mother on feeding cues  -Positioning/latch techniques  8/11/2024 1455 by Sissy Cee RN  Outcome: Completed  8/11/2024 1143 by Sissy Cee RN  Reactivated     Problem: INADEQUATE LATCH, SUCK OR SWALLOW  Goal: Demonstrate ability to latch and sustain latch, audible swallowing and satiety  Description: INTERVENTIONS:  - Assess oral anatomy, notify Physician/AP for abnormal findings  - Establish milk expression  - Maximize feeding opportunity (skin to skin, behavioral state)  - Position/latch techniques  - Discourage use of pacifier-artificial nipple  - Mechanical pumping  - Nipple Shield  - Supplemental formula feeding (Physician/AP order)  - Alternative feeding method  8/11/2024 1455 by Sissy Cee RN  Outcome: Completed  8/11/2024 1143 by Sissy Cee RN  Reactivated

## 2024-08-11 NOTE — PLAN OF CARE
Problem: PAIN - ADULT  Goal: Verbalizes/displays adequate comfort level or baseline comfort level  Description: Interventions:  - Encourage patient to monitor pain and request assistance  - Assess pain using appropriate pain scale  - Administer analgesics based on type and severity of pain and evaluate response  - Implement non-pharmacological measures as appropriate and evaluate response  - Consider cultural and social influences on pain and pain management  - Notify physician/advanced practitioner if interventions unsuccessful or patient reports new pain  Outcome: Progressing     Problem: INFECTION - ADULT  Goal: Absence or prevention of progression during hospitalization  Description: INTERVENTIONS:  - Assess and monitor for signs and symptoms of infection  - Monitor lab/diagnostic results  - Monitor all insertion sites, i.e. indwelling lines, tubes, and drains  - Monitor endotracheal if appropriate and nasal secretions for changes in amount and color  - [place] appropriate cooling/warming therapies per order  - Administer medications as ordered  - Instruct and encourage patient and family to use good hand hygiene technique  - Identify and instruct in appropriate isolation precautions for identified infection/condition  Outcome: Progressing  Goal: Absence of fever/infection during neutropenic period  Description: INTERVENTIONS:  - Monitor WBC    Outcome: Progressing     Problem: SAFETY ADULT  Goal: Patient will remain free of falls  Description: INTERVENTIONS:  - Educate patient/family on patient safety including physical limitations  - Instruct patient to call for assistance with activity   - Consult OT/PT to assist with strengthening/mobility   - Keep Call bell within reach  - Keep bed low and locked with side rails adjusted as appropriate  - Keep care items and personal belongings within reach  - Initiate and maintain comfort rounds  - Make Fall Risk Sign visible to staff  - Offer Toileting every  Hours,  in advance of need  - Initiate/Maintain alarm  - Obtain necessary fall risk management equipment:   - Apply yellow socks and bracelet for high fall risk patients  - Consider moving patient to room near nurses station  Outcome: Progressing  Goal: Maintain or return to baseline ADL function  Description: INTERVENTIONS:  -  Assess patient's ability to carry out ADLs; assess patient's baseline for ADL function and identify physical deficits which impact ability to perform ADLs (bathing, care of mouth/teeth, toileting, grooming, dressing, etc.)  - Assess/evaluate cause of self-care deficits   - Assess range of motion  - Assess patient's mobility; develop plan if impaired  - Assess patient's need for assistive devices and provide as appropriate  - Encourage maximum independence but intervene and supervise when necessary  - Involve family in performance of ADLs  - Assess for home care needs following discharge   - Consider OT consult to assist with ADL evaluation and planning for discharge  - Provide patient education as appropriate  Outcome: Progressing  Goal: Maintains/Returns to pre admission functional level  Description: INTERVENTIONS:  - Perform AM-PAC 6 Click Basic Mobility/ Daily Activity assessment daily.  - Set and communicate daily mobility goal to care team and patient/family/caregiver.   - Collaborate with rehabilitation services on mobility goals if consulted  - Perform Range of Motion times a day.  - Reposition patient every  hours.  - Dangle patient  times a day  - Stand patient  times a day  - Ambulate patient  times a day  - Out of bed to chair times a day   - Out of bed for meals  times a day  - Out of bed for toileting  - Record patient progress and toleration of activity level   Outcome: Progressing     Problem: DISCHARGE PLANNING  Goal: Discharge to home or other facility with appropriate resources  Description: INTERVENTIONS:  - Identify barriers to discharge w/patient and caregiver  - Arrange for  needed discharge resources and transportation as appropriate  - Identify discharge learning needs (meds, wound care, etc.)  - Arrange for interpretive services to assist at discharge as needed  - Refer to Case Management Department for coordinating discharge planning if the patient needs post-hospital services based on physician/advanced practitioner order or complex needs related to functional status, cognitive ability, or social support system  Outcome: Progressing     Problem: POSTPARTUM  Goal: Experiences normal postpartum course  Description: INTERVENTIONS:  - Monitor maternal vital signs  - Assess uterine involution and lochia  Outcome: Progressing  Goal: Appropriate maternal -  bonding  Description: INTERVENTIONS:  - Identify family support  - Assess for appropriate maternal/infant bonding   -Encourage maternal/infant bonding opportunities  - Referral to  or  as needed  Outcome: Progressing  Goal: Establishment of infant feeding pattern  Description: INTERVENTIONS:  - Assess breast/bottle feeding  - Refer to lactation as needed  Outcome: Progressing  Goal: Incision(s), wounds(s) or drain site(s) healing without S/S of infection  Description: INTERVENTIONS  - Assess and document dressing, incision, wound bed, drain sites and surrounding tissue  - Provide patient and family education  - Perform skin care/dressing changes every   Outcome: Progressing     Problem: ALTERATION IN THE BREAST  Goal: Optimize infant feeding at the breast  Description: INTERVENTIONS:  - Latch, breast and nipple assessment  - Assess prior breast feeding history  - Hand expression of breast milk  - For cracked, bleeding and or sore nipples reassess latch, treat damaged nipple  -Educate mother on feeding cues  -Positioning/latch techniques  Reactivated     Problem: INADEQUATE LATCH, SUCK OR SWALLOW  Goal: Demonstrate ability to latch and sustain latch, audible swallowing and satiety  Description:  INTERVENTIONS:  - Assess oral anatomy, notify Physician/AP for abnormal findings  - Establish milk expression  - Maximize feeding opportunity (skin to skin, behavioral state)  - Position/latch techniques  - Discourage use of pacifier-artificial nipple  - Mechanical pumping  - Nipple Shield  - Supplemental formula feeding (Physician/AP order)  - Alternative feeding method  Reactivated

## 2024-08-11 NOTE — PROGRESS NOTES
"Progress Note - OB/GYN  Cathryn Craig 34 y.o. female MRN: [id number]  Unit/Bed#:  323-01 Encounter: [id number]    Assessment and Plan   Cathryn Craig is a patient of: [facility] . She is PPD# 2 s/p spontaneous vaginal delivery. Recovering well and stable.    *  (spontaneous vaginal delivery)  Assessment & Plan  - Continue routine postpartum care  - Pain well controlled with oral analgesics  - Voiding spontaneously  - Tolerating PO fluids and solids  - Encourage breastfeeding and familial bonding      Disposition   - Anticipate discharge home on PPD 2 - today    Subjective/Objective   Chief Complaint: PPD1 s/p spontaneous vaginal delivery  Subjective:    Cathryn Craig has no current complaints. Pain is well controlled. She is currently voiding. She is currently passing flatus and has had no bowel movement. She is ambulating. She is tolerating PO and denies nausea or vomitting. She denies fever, chills, chest pain, shortness of breath, dizziness, calf tenderness. Lochia is normal. She is Breastfeeding.    Vitals:   /79 (BP Location: Right arm)   Pulse 69   Temp 98 °F (36.7 °C) (Oral)   Resp 16   Ht 5' 7\" (1.702 m)   Wt 90.3 kg (199 lb)   LMP 10/29/2023 (Approximate) Comment: LMP 10/29/2023  SpO2 100%   Breastfeeding Yes   BMI 31.17 kg/m²     No intake or output data in the 24 hours ending 24 0543      Physical Exam:   GEN: well-appearing, alert and oriented x3  CARDIO: regular rate  RESP: nonlabored respirations on room air  ABDOMEN: soft, nontender, nodistended, fundus firm below the umbilicus  EXTREMITIES: SCDs in place, nontender, no erythema, no edema    Labs:   Hemoglobin   Date Value Ref Range Status   2024 13.6 11.5 - 15.4 g/dL Final     External Hemoglobin   Date Value Ref Range Status   2024 12.7 g/dL Final     WBC   Date Value Ref Range Status   2024 12.97 (H) 4.31 - 10.16 Thousand/uL Final     Platelets   Date Value Ref Range Status   2024 " 181 149 - 390 Thousands/uL Final     External Platelets   Date Value Ref Range Status   01/17/2024 287 K/µL Final        Dacia Yanes MD  OBEMBER, PGY-2  08/11/24  5:44 AM
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
## 2024-08-16 LAB — PLACENTA IN STORAGE: NORMAL

## 2024-09-06 ENCOUNTER — NURSE TRIAGE (OUTPATIENT)
Age: 34
End: 2024-09-06

## 2024-09-06 NOTE — TELEPHONE ENCOUNTER
"Pt called in reporting a vaginal delivery 8/9/24. States that her bleeding was slowing down to just spotting. She had intercourse two days ago and now her bleeding is increasing slightly. Changing her pad a couple of times a day. Noticed a few small pea sized clots last night. Denies any pain. Feeling well otherwise. Advised pelvic rest until seen at her postpartum visit 9/16/24. Advised to call back with heavy bleeding, passing large clots, severe pain, fever, abnormal discharge/foul odor or other concerns questions. She verbalized understanding and is thankful.    Reason for Disposition   Normal postpartum bleeding    Answer Assessment - Initial Assessment Questions  1. ONSET: \"Describe your bleeding: is it getting worse, staying the same, improving, or stopping and starting?\"      Worsening slightly  2. AMOUNT: \"How much bleeding are you having today?\"      - SPOTTING: spotting, or pinkish / brownish mucous discharge; does not fill panti-liner or pad     - MILD:  less than 1 pad / hour; less than patient's usual menstrual bleeding    - MODERATE: 1-2 pads / hour; 1 menstrual cup every 6 hours; small-medium blood clots (e.g., pea, grape, small coin)    - SEVERE: soaking 2 or more pads/hour for 2 or more hours; 1 menstrual cup every 2 hours; bleeding not contained by pads or continuous red blood from vagina; large blood clots (e.g., golf ball, large coin)       mild  3. ABDOMINAL PAIN: \"Do you have any pain?\" \"How bad is the pain?\" \"What does it keep you from doing?\"      - MILD -  doesn't interfere with normal activities, abdomen soft and not tender to touch      - MODERATE - interferes with normal activities or awakens from sleep, tender to touch      - SEVERE - excruciating pain, doubled over, unable to do any normal activities        denies  4. HORMONES: \"Are you taking any birth control medications?\" (e.g., birth control pills, Depo-Provera)      denies  5. BLOOD THINNERS: \"Do you take any blood thinners?\" " "(e.g., coumadin, aspirin)      denies  6. OTHER SYMPTOMS: \"Do you have any other symptoms?\" (e.g., fever, chills, dizziness)      denies  7. DELIVERY DATE: \"When was your delivery date?\" \"Vaginal delivery or ?\"      24, vaginal  8. BREASTFEEDING: \"Are you breastfeeding?\"      yes    Protocols used: Postpartum - Vaginal Bleeding and Lochia-ADULT-OH    "

## 2024-09-16 ENCOUNTER — TELEPHONE (OUTPATIENT)
Age: 34
End: 2024-09-16

## 2024-09-16 ENCOUNTER — HOSPITAL ENCOUNTER (INPATIENT)
Facility: HOSPITAL | Age: 34
LOS: 1 days | Discharge: HOME | DRG: 817 | End: 2024-09-17
Attending: EMERGENCY MEDICINE | Admitting: INTERNAL MEDICINE
Payer: COMMERCIAL

## 2024-09-16 DIAGNOSIS — T39.1X2A ACETAMINOPHEN OVERDOSE, INTENTIONAL SELF-HARM, INITIAL ENCOUNTER (HCC): Primary | ICD-10-CM

## 2024-09-16 DIAGNOSIS — T14.91XA ATTEMPTED SUICIDE (HCC): ICD-10-CM

## 2024-09-16 PROBLEM — F32.9 MDD (MAJOR DEPRESSIVE DISORDER): Status: ACTIVE | Noted: 2024-09-16

## 2024-09-16 LAB
ALBUMIN SERPL BCG-MCNC: 4.1 G/DL (ref 3.5–5)
ALP SERPL-CCNC: 95 U/L (ref 34–104)
ALT SERPL W P-5'-P-CCNC: 19 U/L (ref 7–52)
AMPHETAMINES SERPL QL SCN: NEGATIVE
ANION GAP SERPL CALCULATED.3IONS-SCNC: 6 MMOL/L (ref 4–13)
APAP SERPL-MCNC: 9 UG/ML (ref 10–20)
AST SERPL W P-5'-P-CCNC: 19 U/L (ref 13–39)
BACTERIA UR QL AUTO: ABNORMAL /HPF
BARBITURATES UR QL: NEGATIVE
BASOPHILS # BLD AUTO: 0.05 THOUSANDS/ΜL (ref 0–0.1)
BASOPHILS NFR BLD AUTO: 1 % (ref 0–1)
BENZODIAZ UR QL: NEGATIVE
BILIRUB SERPL-MCNC: 0.45 MG/DL (ref 0.2–1)
BILIRUB UR QL STRIP: NEGATIVE
BUN SERPL-MCNC: 8 MG/DL (ref 5–25)
CALCIUM SERPL-MCNC: 9.1 MG/DL (ref 8.4–10.2)
CHLORIDE SERPL-SCNC: 104 MMOL/L (ref 96–108)
CLARITY UR: CLEAR
CO2 SERPL-SCNC: 27 MMOL/L (ref 21–32)
COCAINE UR QL: NEGATIVE
COLOR UR: COLORLESS
CREAT SERPL-MCNC: 0.8 MG/DL (ref 0.6–1.3)
EOSINOPHIL # BLD AUTO: 0.17 THOUSAND/ΜL (ref 0–0.61)
EOSINOPHIL NFR BLD AUTO: 2 % (ref 0–6)
ERYTHROCYTE [DISTWIDTH] IN BLOOD BY AUTOMATED COUNT: 12.4 % (ref 11.6–15.1)
ETHANOL EXG-MCNC: 0 MG/DL
EXT PREGNANCY TEST URINE: NEGATIVE
EXT. CONTROL: NORMAL
FENTANYL UR QL SCN: NEGATIVE
GFR SERPL CREATININE-BSD FRML MDRD: 95 ML/MIN/1.73SQ M
GLUCOSE SERPL-MCNC: 89 MG/DL (ref 65–140)
GLUCOSE UR STRIP-MCNC: NEGATIVE MG/DL
HCT VFR BLD AUTO: 39.9 % (ref 34.8–46.1)
HGB BLD-MCNC: 13.2 G/DL (ref 11.5–15.4)
HGB UR QL STRIP.AUTO: NEGATIVE
HYDROCODONE UR QL SCN: NEGATIVE
IMM GRANULOCYTES # BLD AUTO: 0.02 THOUSAND/UL (ref 0–0.2)
IMM GRANULOCYTES NFR BLD AUTO: 0 % (ref 0–2)
INR PPP: 1.03 (ref 0.85–1.19)
KETONES UR STRIP-MCNC: NEGATIVE MG/DL
LEUKOCYTE ESTERASE UR QL STRIP: ABNORMAL
LIPASE SERPL-CCNC: 24 U/L (ref 11–82)
LYMPHOCYTES # BLD AUTO: 2.33 THOUSANDS/ΜL (ref 0.6–4.47)
LYMPHOCYTES NFR BLD AUTO: 30 % (ref 14–44)
MCH RBC QN AUTO: 30.1 PG (ref 26.8–34.3)
MCHC RBC AUTO-ENTMCNC: 33.1 G/DL (ref 31.4–37.4)
MCV RBC AUTO: 91 FL (ref 82–98)
METHADONE UR QL: NEGATIVE
MONOCYTES # BLD AUTO: 0.66 THOUSAND/ΜL (ref 0.17–1.22)
MONOCYTES NFR BLD AUTO: 9 % (ref 4–12)
NEUTROPHILS # BLD AUTO: 4.56 THOUSANDS/ΜL (ref 1.85–7.62)
NEUTS SEG NFR BLD AUTO: 58 % (ref 43–75)
NITRITE UR QL STRIP: NEGATIVE
NON-SQ EPI CELLS URNS QL MICRO: ABNORMAL /HPF
NRBC BLD AUTO-RTO: 0 /100 WBCS
OPIATES UR QL SCN: NEGATIVE
OXYCODONE+OXYMORPHONE UR QL SCN: NEGATIVE
PCP UR QL: NEGATIVE
PH UR STRIP.AUTO: 6 [PH]
PLATELET # BLD AUTO: 230 THOUSANDS/UL (ref 149–390)
PMV BLD AUTO: 9.8 FL (ref 8.9–12.7)
POTASSIUM SERPL-SCNC: 3.9 MMOL/L (ref 3.5–5.3)
PROT SERPL-MCNC: 6.6 G/DL (ref 6.4–8.4)
PROT UR STRIP-MCNC: NEGATIVE MG/DL
PROTHROMBIN TIME: 14.2 SECONDS (ref 12.3–15)
RBC # BLD AUTO: 4.39 MILLION/UL (ref 3.81–5.12)
RBC #/AREA URNS AUTO: ABNORMAL /HPF
SALICYLATES SERPL-MCNC: <5 MG/DL (ref 3–20)
SODIUM SERPL-SCNC: 137 MMOL/L (ref 135–147)
SP GR UR STRIP.AUTO: 1 (ref 1–1.03)
THC UR QL: NEGATIVE
TSH SERPL DL<=0.05 MIU/L-ACNC: 1.34 UIU/ML (ref 0.45–4.5)
UROBILINOGEN UR STRIP-ACNC: <2 MG/DL
WBC # BLD AUTO: 7.79 THOUSAND/UL (ref 4.31–10.16)
WBC #/AREA URNS AUTO: ABNORMAL /HPF

## 2024-09-16 PROCEDURE — 99284 EMERGENCY DEPT VISIT MOD MDM: CPT

## 2024-09-16 PROCEDURE — 80143 DRUG ASSAY ACETAMINOPHEN: CPT | Performed by: EMERGENCY MEDICINE

## 2024-09-16 PROCEDURE — 84443 ASSAY THYROID STIM HORMONE: CPT | Performed by: EMERGENCY MEDICINE

## 2024-09-16 PROCEDURE — 80179 DRUG ASSAY SALICYLATE: CPT | Performed by: EMERGENCY MEDICINE

## 2024-09-16 PROCEDURE — 80307 DRUG TEST PRSMV CHEM ANLYZR: CPT | Performed by: EMERGENCY MEDICINE

## 2024-09-16 PROCEDURE — 82075 ASSAY OF BREATH ETHANOL: CPT | Performed by: EMERGENCY MEDICINE

## 2024-09-16 PROCEDURE — 81025 URINE PREGNANCY TEST: CPT | Performed by: EMERGENCY MEDICINE

## 2024-09-16 PROCEDURE — 99223 1ST HOSP IP/OBS HIGH 75: CPT | Performed by: INTERNAL MEDICINE

## 2024-09-16 PROCEDURE — 81001 URINALYSIS AUTO W/SCOPE: CPT | Performed by: EMERGENCY MEDICINE

## 2024-09-16 PROCEDURE — 99285 EMERGENCY DEPT VISIT HI MDM: CPT | Performed by: EMERGENCY MEDICINE

## 2024-09-16 PROCEDURE — 36415 COLL VENOUS BLD VENIPUNCTURE: CPT | Performed by: EMERGENCY MEDICINE

## 2024-09-16 PROCEDURE — 85025 COMPLETE CBC W/AUTO DIFF WBC: CPT | Performed by: EMERGENCY MEDICINE

## 2024-09-16 PROCEDURE — 80053 COMPREHEN METABOLIC PANEL: CPT | Performed by: EMERGENCY MEDICINE

## 2024-09-16 PROCEDURE — 83690 ASSAY OF LIPASE: CPT | Performed by: EMERGENCY MEDICINE

## 2024-09-16 PROCEDURE — 85610 PROTHROMBIN TIME: CPT

## 2024-09-16 PROCEDURE — 93005 ELECTROCARDIOGRAM TRACING: CPT

## 2024-09-16 RX ORDER — NICOTINE 21 MG/24HR
1 PATCH, TRANSDERMAL 24 HOURS TRANSDERMAL DAILY
Status: DISCONTINUED | OUTPATIENT
Start: 2024-09-16 | End: 2024-09-17 | Stop reason: HOSPADM

## 2024-09-16 RX ORDER — POLYETHYLENE GLYCOL 3350 17 G/17G
17 POWDER, FOR SOLUTION ORAL DAILY PRN
Status: DISCONTINUED | OUTPATIENT
Start: 2024-09-16 | End: 2024-09-17 | Stop reason: HOSPADM

## 2024-09-16 RX ORDER — SERTRALINE HYDROCHLORIDE 25 MG/1
25 TABLET, FILM COATED ORAL DAILY
Status: DISCONTINUED | OUTPATIENT
Start: 2024-09-16 | End: 2024-09-17 | Stop reason: HOSPADM

## 2024-09-16 RX ORDER — ONDANSETRON 2 MG/ML
4 INJECTION INTRAMUSCULAR; INTRAVENOUS EVERY 6 HOURS PRN
Status: DISCONTINUED | OUTPATIENT
Start: 2024-09-16 | End: 2024-09-17 | Stop reason: HOSPADM

## 2024-09-16 RX ORDER — CHLORAL HYDRATE 500 MG
2000 CAPSULE ORAL DAILY
Status: DISCONTINUED | OUTPATIENT
Start: 2024-09-16 | End: 2024-09-17 | Stop reason: HOSPADM

## 2024-09-16 RX ADMIN — Medication 54 MG: at 18:09

## 2024-09-16 RX ADMIN — OMEGA-3 FATTY ACIDS CAP 1000 MG 2000 MG: 1000 CAP at 18:09

## 2024-09-16 RX ADMIN — SERTRALINE HYDROCHLORIDE 25 MG: 25 TABLET ORAL at 18:09

## 2024-09-16 RX ADMIN — ACETYLCYSTEINE 8140 MG: 200 INJECTION INTRAVENOUS at 22:04

## 2024-09-16 RX ADMIN — Medication 1 TABLET: at 18:09

## 2024-09-16 RX ADMIN — ACETYLCYSTEINE 11940 MG: 200 INJECTION INTRAVENOUS at 13:22

## 2024-09-16 RX ADMIN — ACETYLCYSTEINE 3980 MG: 6 INJECTION, SOLUTION INTRAVENOUS at 18:09

## 2024-09-16 NOTE — CONSULTS
INTERPROFESSIONAL (PHONE) CONSULTATION - Medical Toxicology  Cathryn Craig 34 y.o. female MRN: [id number]  Unit/Bed#: ED-36 Encounter: [id number]      Reason for Consult / Principal Problem: Acetaminophen ingestion  Inpatient consult to Toxicology  Consult performed by: Sher Thakkar DO  Consult ordered by: Mitchell Lord MD        09/16/24      ASSESSMENT:  Intentional acetaminophen ingestion  Suicide attempt      RECOMMENDATIONS:    For this patient would recommend initiating treatment with N-acetylcysteine therapy.  Patient's acetaminophen level was 9 mcg/mL which was approximately 19-hours after the ingestion.  At this time the treatment threshold is 11.  Given this is close to the treatment line and any small air in timing could put her easily above the treatment threshold line, believe that the safest course of action is to treat.  Please give patient a 150 mg/kg loading dose of N-acetylcysteine therapy over 1 hour.  Please see below for further maintenance infusions.  Please check CMP, INR, acetaminophen level every 12 hours.  Please see below for discontinuation criteria.    Agree with admission and one-to-one monitoring with self harm reduction techniques.  Please avoid administering any acetaminophen containing products while patient is hospitalized.    Loading dose- 150mg/kg infused over 60 minutes   Maintenance Infusion #1- 50mg/kg (12.5mg/kg/hr) over 4 hours   Maintenance Infusion #2 -100mg/kg (6.25 mg/kg/hr) until treatment endpoint   Treatment Endpoint: 20 hours or more     NAC should be continued for the full course.     NAC can be stopped when APAP is undetectable, AST/ALT have peaked and are downtrending, and patient appears clinically well.     For further questions, please contact the medical  on call via [employer] between 8am and 9pm. If between 9pm and 8am, please reach out to the Poison Center at [phone].     Please see additional teaching note  below:    Medical Toxicology Teaching Note  [facility]  Acetaminophen Toxicity  Last revised October 2017     Acetaminophen (Tylenol) is a nonopiod analgesic and antipyretic medication found in many over-the-counter and prescription products such as Tylenol PM, Norco, Percocet, Nyquil, Vicks Formula 44-D. The recommended maximum daily dose of acetaminophen for adults is 3g/day, and 75-90mg/kg/day for children. Alcoholics may safely take Tylenol in therapeutic doses, but they may be at increased risk for hepatotoxicity in overdose.     Mechanism of Toxicity: Acetaminophen is primarily metabolized by the liver. In therapeutic doses, about 90% of acetaminophen is conjugated to nontoxic metabolites (glucoronides and sulfates). A small portion (<5%) is conjugated by cytochrome P450 enzyme, subunit CYP2E1, to a toxic metabolite, N-acetyl-p-benzoquinoneimine (NAPQI). This metabolite is further conjugated by glutathione, to nontoxic metabolites eliminated by the kidneys.   Liver Injury:  In toxic doses, the usual metabolic pathways are overwhelmed; acetaminophen is shunted to the cytochrome P450 pathway, creating NAPQI. Glutathione stores are depleted and NAPQI is produced. Cellular injury and hepatic necrosis may occur as NAPQI accumulates.   Renal Injury:  Cytochrome P450 activity in the kidneys is thought to cause direct renal damage. Renal insufficiency may also develop during fulminant hepatic failure due to hepatorenal syndrome. Renal toxicity is usually associated with liver injury.   Pharmacokinetics:  Acetaminophen is rapidly absorbed. Peak levels occur within  minutes with normal doses. Delayed absorption may occur with sustained release products or with co-ingestions that slow the GI tract (opiods, anticholinergics). The elimination half-life is 1-3 hours after therapeutic doses and may extend to 12 hours after overdose.   Toxic Dose:  Toxicity in adults may occur with acute  ingestions of 7g, and 200mg/kg in children. Hepatic injury following chronic ingestions may occur at any dose above the daily recommended dose.     Clinical Presentation:    Acute Ingestion: Within 8 hrs of an acute ingestion, there are usually few symptoms. Between 8-30 hours after a toxic, acute ingestion, a transaminitis will develop. Nausea, vomiting, and right upper quadrant pain may occur. Within 12-36 hours, worsening AST/ALT develops with elevated bilirubin and INR. The most severe cases will develop fulminant liver failure with hepatic encephalopathy and acidosis, usually within 3-7 days post overdose. The patient should be evaluated for a liver transplantation.   Repeated Supra-therapeutic Ingestion: Due to a sub-acute course, patients may present anywhere along a spectrum - normal LFTS to asymptomatic elevation of enzymes to hepatic failure.     Diagnosis   Acute Ingestion (Time of Ingestion Known): After an acute ingestion at a known time, obtain a 4-hour post-ingestion serum acetaminophen level and plot the level on the Benton-Gideon’s nomogram (see below). This nomogram is used to predict the likelihood of hepatic toxicity based on the level of acetaminophen between 4 and 24 hours post-ingestion. The nomogram CANNOT be used if the time of ingestion is unknown.   The dotted line (Rumack-Gideon line), marking a 4-hour level at 200 mcg/ml, is the original line developed from the study above which hepatic toxicity will probably occur. The solid line (Treatment Line), marking a 4-hour level at 150ug/ml. is the treatment line accepted as the standard of care in the [place] and is 25% lower as a safety margin. If the patient’s serum APAP level falls above the treatment line, start treatment with N-acetylcysteine (NAC). (see Treatment below)           Acute Ingestion (Time of Ingestion Unknown) or Repeated Supra-therapeutic Ingestion An acetaminophen level CANNOT be plotted on the Rumack-Gideon’s  nomogram. Draw an APAP level and AST/ALT at time of presentation. Anyone with an APAP level> 10mcg/ml OR elevated AST/ALT should start NAC. (see Treatment below)     TREATMENT   Emergency and Supportive Care: Treat nausea and vomiting to protect airway and support safe administration of charcoal and NAC, when indicated (see below). Provide standard supportive care for liver and renal failure. Contact liver transplant team if fulminant hepatic failure occurs.   Decontamination:  Administer activated charcoal within 2 hours of ingestion (consider later if extended release preparations). Use antiemetics for nausea. Activated charcoal does bind to NAC, but the effect is not thought to be clinically significant. Gastric emptying is not recommended.   Specific Drugs and Antidotes.   Acute Ingestion Treat with NAC if the APAP level falls above the Treatment Line on the nomogram. The maximal benefit occurs if given within 8 hours of acute ingestion. Therefore, it is recommended to empirically start NAC before a level is obtained if there is a reasonable concern of a toxic ingestion presenting close to 8 hours or beyond. In late presenters (>8hrs), start NAC and treat for a full course or longer if LFTS remain abnormal. Treatment maybe stopped when AST/ALT peak and then downtrend, with an INR <2 and patient is clinically well. If abnormal labs persist, continue NAC and call Toxicology. There are two routes of administration for NAC, oral and IV. The treatment protocols are described below.   Acute Ingestion (Time of Ingestion Unknown) or Repeated Supra-therapeutic Ingestion   The nomogram CANNOT be used to estimate the risk of hepatotoxicity. At presentation, check a serum APAP level and AST/ALT. If the APAP level is above 10 mcg/ml or the AST/ALT are elevated, start NAC treatment for 12 hours. If abnormalities persist, continue NAC treatment and call toxicology. If the APAP level is undetectable and AST and ALT are  downtrending at the end of 12 hours, treatment may be stopped.     Intravenous (Acetadote)   Loading dose- 150mg/kg infused over 15-60 minutes   Maintenance Infusion #1- 50mg/kg (12.5mg/kg/hr) over 4 hours   Maintenance Infusion #2 -100mg/kg (6.25 mg/kg/hr) until treatment endpoint   Treatment Endpoint: 20 hours or more   NAC should be continued for the full course.   NAC can be stopped when APAP is undetectable, AST/ALT have peaked and are downtrending, and patient appears clinically well. Consultation with a medical  /poison center is recommended before changes in the duration of therapy are made.     Acetaminophen Toxicity Do’s and Don’ts   Acute Ingestions   DO give charcoal for decontamination within 2 hours of ingestion if the patient can adequately protect their airway.   DO start NAC empirically, i.e. without an APAP level, if the ingestion is likely a large overdose presenting at 8 hours or more after ingestion.   DO contact the Liver Transplant Team early if liver failure is developing.   DO NOT get a level before 4hrs post-ingestion if the time of ingestion is certain in an acute overdose.   DO NOT stop NAC therapy until full course is finished or truncated therapy is recommended by the Poison Center.   Repeated Supra-Therapeutic Ingestions (RSI)   DO ask patients with pain complaints (toothaches, back pain, cancer) about the amount of acetaminophen they use.   DO NOT use the Rumack-Sosa nomogram to determine if the APAP level is toxic.   DO NOT stop NAC therapy until full course is finished or truncated therapy is recommended by the Poison Center.   NAC Protocols   DO stop IV NAC if an anaphylactoid reaction occurs (rare). Treat the reaction appropriately and call the Poison Center for recommendations on continued NAC therapy.   DO give charcoal with oral NAC when charcoal is indicated.   References   Chris CRUMP Acetaminophen. In Chris CRUMP, Suzie CLEMENT, Jeanne KULKARNI et al eds. Medical Toxicology  3rd edition. [place]: [employer], 2004: pp.723-737.   Jovany KR Acetaminophen. In Manzo KR       Hx and PE limited by the dynamics of a phone consultation. I have not personally interviewed or evaluated the patient, but only advised based on the information provided to me. Primary provider is responsible for all clinical decisions.     Pertinent history, physical exam and clinical findings and course discussed: Cathryn Craig is a 34 y.o. year old female who presents with intentional ingestion of acetaminophen.  Reported to have ingested 2 bottles of NyQuil and DayQuil yesterday.  This occurred approximately 19 hours ago.  Did have some alcohol as well with this but does not use alcohol on a regular basis.    Review of systems and physical exam not performed by me.    Historical Information   Past Medical History:   Diagnosis Date    Abnormal Pap smear of cervix     last pap 1/2024    Depression     no meds    Varicella      Past Surgical History:   Procedure Laterality Date    REMOVAL OF INTRAUTERINE DEVICE (IUD)      5 year ago     Social History   Social History     Substance and Sexual Activity   Alcohol Use Not Currently    Alcohol/week: 10.0 standard drinks of alcohol    Types: 10 Shots of liquor per week    Comment: pregnant     Social History     Substance and Sexual Activity   Drug Use Not Currently    Comment: ketamine, cocaine - not since 9/2023     Social History     Tobacco Use   Smoking Status Some Days    Types: Cigarettes   Smokeless Tobacco Never   Tobacco Comments    Was vaping 5x/day prior to pregnancy (nicotine) - still vaping     Family History   Problem Relation Age of Onset    No Known Problems Mother     No Known Problems Father     No Known Problems Sister     No Known Problems Brother     No Known Problems Maternal Grandmother     No Known Problems Maternal Grandfather     Cancer Paternal Grandmother         pancreatic    Cancer Paternal Grandfather         brain         Prior to Admission medications    Medication Sig Start Date End Date Taking? Authorizing Provider   acetaminophen (TYLENOL) 325 mg tablet Take 3 tablets (975 mg total) by mouth every 6 (six) hours 24   Sarah Rios MD   benzocaine-menthol-lanolin-aloe (DERMOPLAST) 20-0.5 % topical spray Apply 1 Application topically every 6 (six) hours as needed for mild pain or irritation 24   Sarah Rios MD   CHOLINE PO Take by mouth    Historical Provider, MD   docusate sodium (COLACE) 100 mg capsule Take 1 capsule (100 mg total) by mouth 2 (two) times a day 24   Sarah Rios MD   EVENING PRIMROSE OIL PO Take by mouth    Historical Provider, MD   Ferrous Sulfate (IRON PO) Take 1 tablet by mouth daily    Historical Provider, MD   fish oil-omega-3 fatty acids 1000 MG capsule Take 2 g by mouth daily    Historical Provider, MD   hydrocortisone 1 % cream Apply 1 Application topically daily as needed for irritation 24   Sarah Rios MD   ibuprofen (MOTRIN) 600 mg tablet Take 1 tablet (600 mg total) by mouth every 6 (six) hours 24   Sarah Rios MD   MAGNESIUM PO Take by mouth    Historical Provider, MD   Prenatal Multivit-Min-Fe-FA (PRE-ADEEL PO) Take by mouth    Historical Provider, MD   witch hazel-glycerin (TUCKS) topical pad Apply 1 Pad topically every 4 (four) hours as needed for irritation 24   Sarah Rios MD         Current Facility-Administered Medications:     acetylcysteine (ACETADOTE) 11,940 mg in dextrose 5 % 200 mL IVPB, Once    No Known Allergies    Objective     No intake or output data in the 24 hours ending 24 1251    Invasive Devices:   Peripheral IV 24 Right Antecubital (Active)   Site Assessment WDL 24 1129       Vitals   Vitals:    24 0947 24 0948   BP: 134/83    Pulse: 89    Resp: 18    Patient Position - Orthostatic VS: Sitting    Temp:  98.6 °F (37 °C)             Lab Results: I have  "reviewed lab results    Labs:    Results from last 7 days   Lab Units 09/16/24  1118   WBC Thousand/uL 7.79   HEMOGLOBIN g/dL 13.2   HEMATOCRIT % 39.9   PLATELETS Thousands/uL 230   SEGS PCT % 58   LYMPHO PCT % 30   MONO PCT % 9   EOS PCT % 2      Results from last 7 days   Lab Units 09/16/24  1118   SODIUM mmol/L 137   POTASSIUM mmol/L 3.9   CHLORIDE mmol/L 104   CO2 mmol/L 27   BUN mg/dL 8   CREATININE mg/dL 0.80   CALCIUM mg/dL 9.1   ALK PHOS U/L 95   ALT U/L 19   AST U/L 19              No results found for: \"TROPONINI\"      Results from last 7 days   Lab Units 09/16/24  1118   ACETAMINOPHEN LVL ug/mL 9*   SALICYLATE LVL mg/dL <5     Invalid input(s): \"EXTPREGUR\"      Imaging Studies: No pertinent imaging studies reviewed.    Counseling / Coordination of Care  Total time spent today 20 minutes. This was a phone consultation.       "

## 2024-09-16 NOTE — ASSESSMENT & PLAN NOTE
Postpartum depression  Patient had hopelessness, helplessness, worthlessness, low mood, suicidal ideation    Plan:  Sertraline can be considered   psychiatry consult  Psychology consult can be considered for CBT  If symptoms does not improve, patient can get benefit from ECT  One-on-one observation
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
## 2024-09-16 NOTE — TELEPHONE ENCOUNTER
Patient called to r/s Postpartum appt today due to bein gin the ED currently (pt verified non-pb related).   S/w office due to no availability at [facility] location; requested to send tel encounter & will follow up with pt if any cancellations; pt agreeable.  24 - Dr Houston

## 2024-09-16 NOTE — ED NOTES
Crisis consulted with Psychiatry.  They will intend on a full assessment tomorrow. Psychiatry indicated that in the event that the patient attempts to leave AMA, [facility] should be consulted emergently and a 302 should be petitioned.

## 2024-09-16 NOTE — ASSESSMENT & PLAN NOTE
Postpartum depression with suicidal ideation  Patient had 16 ounces (8 ounce of DayQuil, 8 ounces of NyQuil), on 9/15, suicidal ideation  Tylenol lab work showed Tylenol level: 9, (most likely falsely low value)  Rapid drug screen unremarkable    Plan:  Toxicology consult  One-on-one continue observation  Loading dose- 150mg/kg infused over 60 minutes   Maintenance Infusion #1- 50mg/kg (12.5mg/kg/hr) over 4 hours   Maintenance Infusion #2 -100mg/kg (6.25 mg/kg/hr) until treatment endpoint   Treatment Endpoint: 20 hours or more    NAC should be continued for the full course.    NAC can be stopped when APAP is undetectable, AST/ALT have peaked and are downtrending, and patient appears clinically well.  CMP, INR, acetaminophen level every 12 hours
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
## 2024-09-16 NOTE — PLAN OF CARE
Problem: PAIN - ADULT  Goal: Verbalizes/displays adequate comfort level or baseline comfort level  Description: Interventions:  - Encourage patient to monitor pain and request assistance  - Assess pain using appropriate pain scale  - Administer analgesics based on type and severity of pain and evaluate response  - Implement non-pharmacological measures as appropriate and evaluate response  - Consider cultural and social influences on pain and pain management  - Notify physician/advanced practitioner if interventions unsuccessful or patient reports new pain  Outcome: Progressing     Problem: INFECTION - ADULT  Goal: Absence or prevention of progression during hospitalization  Description: INTERVENTIONS:  - Assess and monitor for signs and symptoms of infection  - Monitor lab/diagnostic results  - Monitor all insertion sites, i.e. indwelling lines, tubes, and drains  - Monitor endotracheal if appropriate and nasal secretions for changes in amount and color  - Benson appropriate cooling/warming therapies per order  - Administer medications as ordered  - Instruct and encourage patient and family to use good hand hygiene technique  - Identify and instruct in appropriate isolation precautions for identified infection/condition  Outcome: Progressing  Goal: Absence of fever/infection during neutropenic period  Description: INTERVENTIONS:  - Monitor WBC    Outcome: Progressing     Problem: SAFETY ADULT  Goal: Patient will remain free of falls  Description: INTERVENTIONS:  - Educate patient/family on patient safety including physical limitations  - Instruct patient to call for assistance with activity   - Consult OT/PT to assist with strengthening/mobility   - Keep Call bell within reach  - Keep bed low and locked with side rails adjusted as appropriate  - Keep care items and personal belongings within reach  - Initiate and maintain comfort rounds  - Make Fall Risk Sign visible to staff  - Offer Toileting every  Hours,  in advance of need  - Initiate/Maintain alarm  - Obtain necessary fall risk management equipment:   - Apply yellow socks and bracelet for high fall risk patients  - Consider moving patient to room near nurses station  Outcome: Progressing  Goal: Maintain or return to baseline ADL function  Description: INTERVENTIONS:  -  Assess patient's ability to carry out ADLs; assess patient's baseline for ADL function and identify physical deficits which impact ability to perform ADLs (bathing, care of mouth/teeth, toileting, grooming, dressing, etc.)  - Assess/evaluate cause of self-care deficits   - Assess range of motion  - Assess patient's mobility; develop plan if impaired  - Assess patient's need for assistive devices and provide as appropriate  - Encourage maximum independence but intervene and supervise when necessary  - Involve family in performance of ADLs  - Assess for home care needs following discharge   - Consider OT consult to assist with ADL evaluation and planning for discharge  - Provide patient education as appropriate  Outcome: Progressing  Goal: Maintains/Returns to pre admission functional level  Description: INTERVENTIONS:  - Perform AM-PAC 6 Click Basic Mobility/ Daily Activity assessment daily.  - Set and communicate daily mobility goal to care team and patient/family/caregiver.   - Collaborate with rehabilitation services on mobility goals if consulted  - Perform Range of Motion  times a day.  - Reposition patient every  hours.  - Dangle patient  times a day  - Stand patient  times a day  - Ambulate patient  times a day  - Out of bed to chair  times a day   - Out of bed for meals times a day  - Out of bed for toileting  - Record patient progress and toleration of activity level   Outcome: Progressing     Problem: DISCHARGE PLANNING  Goal: Discharge to home or other facility with appropriate resources  Description: INTERVENTIONS:  - Identify barriers to discharge w/patient and caregiver  - Arrange for  needed discharge resources and transportation as appropriate  - Identify discharge learning needs (meds, wound care, etc.)  - Arrange for interpretive services to assist at discharge as needed  - Refer to Case Management Department for coordinating discharge planning if the patient needs post-hospital services based on physician/advanced practitioner order or complex needs related to functional status, cognitive ability, or social support system  Outcome: Progressing     Problem: Knowledge Deficit  Goal: Patient/family/caregiver demonstrates understanding of disease process, treatment plan, medications, and discharge instructions  Description: Complete learning assessment and assess knowledge base.  Interventions:  - Provide teaching at level of understanding  - Provide teaching via preferred learning methods  Outcome: Progressing

## 2024-09-16 NOTE — ED NOTES
Mom called [phone] rob states daughter might have post partum and needs  help. If need be call her     Abdulaziz Hernandez  09/16/24 2123

## 2024-09-16 NOTE — ED NOTES
Patient given the phone to speak with mom. Patient seemed visibly upset with mom on the phone and began to get worked up. Patient is stating that she feels she has no help with the baby at home and not getting the support she needs which is triggering her to feel this upset and wants to leave her current relationship.      Ella Stroud  09/16/24 1230       Ella Stroud  09/16/24 1237

## 2024-09-16 NOTE — QUICK NOTE
This writer spoke with attending physician, ED crisis regarding this patient.  In the setting of a serious suicide attempt within the last 30 days this patient currently meets criteria for inpatient psychiatric admission on an involuntary basis.  The patient is currently admitted for elevated acetaminophen levels.  When medically cleared patient will need to go to an inpatient psychiatric unit for further treatment and stabilization.  If patient attempts to leave hospital AMA, a 302 should be petitioned.  Patient is not safe for discharge home.  If there are any psychiatric emergencies regarding this patient overnight please reach out to tele-psychiatry service, Kaitlin.  This writer and the rest of the consult liaison psychiatry team will see this patient tomorrow morning 9/17/2024 for a full psychiatric evaluation.  If the patient is open to signing a voluntary 201 that would be acceptable.  However as stated above she cannot leave the hospital without further psychiatric treatment on an inpatient unit.
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
## 2024-09-17 VITALS
WEIGHT: 179.2 LBS | RESPIRATION RATE: 18 BRPM | OXYGEN SATURATION: 94 % | TEMPERATURE: 98.2 F | DIASTOLIC BLOOD PRESSURE: 85 MMHG | HEART RATE: 74 BPM | BODY MASS INDEX: 28.12 KG/M2 | HEIGHT: 67 IN | SYSTOLIC BLOOD PRESSURE: 123 MMHG

## 2024-09-17 PROBLEM — F90.9 ADHD (ATTENTION DEFICIT HYPERACTIVITY DISORDER): Chronic | Status: ACTIVE | Noted: 2024-09-17

## 2024-09-17 PROBLEM — F39 UNSPECIFIED MOOD (AFFECTIVE) DISORDER (HCC): Chronic | Status: ACTIVE | Noted: 2024-09-17

## 2024-09-17 LAB
ALBUMIN SERPL BCG-MCNC: 3.7 G/DL (ref 3.5–5)
ALBUMIN SERPL BCG-MCNC: 4 G/DL (ref 3.5–5)
ALP SERPL-CCNC: 85 U/L (ref 34–104)
ALP SERPL-CCNC: 88 U/L (ref 34–104)
ALT SERPL W P-5'-P-CCNC: 17 U/L (ref 7–52)
ALT SERPL W P-5'-P-CCNC: 20 U/L (ref 7–52)
ANION GAP SERPL CALCULATED.3IONS-SCNC: 5 MMOL/L (ref 4–13)
ANION GAP SERPL CALCULATED.3IONS-SCNC: 6 MMOL/L (ref 4–13)
APAP SERPL-MCNC: <2 UG/ML (ref 10–20)
APAP SERPL-MCNC: <2 UG/ML (ref 10–20)
AST SERPL W P-5'-P-CCNC: 14 U/L (ref 13–39)
AST SERPL W P-5'-P-CCNC: 17 U/L (ref 13–39)
ATRIAL RATE: 68 BPM
BILIRUB SERPL-MCNC: 0.24 MG/DL (ref 0.2–1)
BILIRUB SERPL-MCNC: 0.39 MG/DL (ref 0.2–1)
BUN SERPL-MCNC: 11 MG/DL (ref 5–25)
BUN SERPL-MCNC: 8 MG/DL (ref 5–25)
CALCIUM SERPL-MCNC: 9 MG/DL (ref 8.4–10.2)
CALCIUM SERPL-MCNC: 9.3 MG/DL (ref 8.4–10.2)
CHLORIDE SERPL-SCNC: 105 MMOL/L (ref 96–108)
CHLORIDE SERPL-SCNC: 106 MMOL/L (ref 96–108)
CO2 SERPL-SCNC: 29 MMOL/L (ref 21–32)
CO2 SERPL-SCNC: 29 MMOL/L (ref 21–32)
CREAT SERPL-MCNC: 0.6 MG/DL (ref 0.6–1.3)
CREAT SERPL-MCNC: 0.63 MG/DL (ref 0.6–1.3)
GFR SERPL CREATININE-BSD FRML MDRD: 115 ML/MIN/1.73SQ M
GFR SERPL CREATININE-BSD FRML MDRD: 117 ML/MIN/1.73SQ M
GLUCOSE SERPL-MCNC: 124 MG/DL (ref 65–140)
GLUCOSE SERPL-MCNC: 93 MG/DL (ref 65–140)
INR PPP: 0.98 (ref 0.85–1.19)
INR PPP: 1.35 (ref 0.85–1.19)
P AXIS: 44 DEGREES
POTASSIUM SERPL-SCNC: 3.6 MMOL/L (ref 3.5–5.3)
POTASSIUM SERPL-SCNC: 4.1 MMOL/L (ref 3.5–5.3)
PR INTERVAL: 142 MS
PROT SERPL-MCNC: 6.2 G/DL (ref 6.4–8.4)
PROT SERPL-MCNC: 6.8 G/DL (ref 6.4–8.4)
PROTHROMBIN TIME: 13.7 SECONDS (ref 12.3–15)
PROTHROMBIN TIME: 17.5 SECONDS (ref 12.3–15)
QRS AXIS: 24 DEGREES
QRSD INTERVAL: 82 MS
QT INTERVAL: 394 MS
QTC INTERVAL: 418 MS
SODIUM SERPL-SCNC: 140 MMOL/L (ref 135–147)
SODIUM SERPL-SCNC: 140 MMOL/L (ref 135–147)
T WAVE AXIS: 12 DEGREES
VENTRICULAR RATE: 68 BPM

## 2024-09-17 PROCEDURE — 80053 COMPREHEN METABOLIC PANEL: CPT

## 2024-09-17 PROCEDURE — 85610 PROTHROMBIN TIME: CPT

## 2024-09-17 PROCEDURE — NC001 PR NO CHARGE: Performed by: INTERNAL MEDICINE

## 2024-09-17 PROCEDURE — 80143 DRUG ASSAY ACETAMINOPHEN: CPT

## 2024-09-17 PROCEDURE — 99239 HOSP IP/OBS DSCHRG MGMT >30: CPT | Performed by: INTERNAL MEDICINE

## 2024-09-17 PROCEDURE — 93010 ELECTROCARDIOGRAM REPORT: CPT | Performed by: INTERNAL MEDICINE

## 2024-09-17 PROCEDURE — 99254 IP/OBS CNSLTJ NEW/EST MOD 60: CPT | Performed by: PSYCHIATRY & NEUROLOGY

## 2024-09-17 RX ORDER — SERTRALINE HYDROCHLORIDE 25 MG/1
25 TABLET, FILM COATED ORAL DAILY
Qty: 30 TABLET | Refills: 0 | Status: SHIPPED | OUTPATIENT
Start: 2024-09-18 | End: 2024-09-20

## 2024-09-17 RX ADMIN — OMEGA-3 FATTY ACIDS CAP 1000 MG 2000 MG: 1000 CAP at 08:54

## 2024-09-17 RX ADMIN — SERTRALINE HYDROCHLORIDE 25 MG: 25 TABLET ORAL at 08:54

## 2024-09-17 RX ADMIN — Medication 54 MG: at 09:41

## 2024-09-17 RX ADMIN — NICOTINE 1 PATCH: 21 PATCH, EXTENDED RELEASE TRANSDERMAL at 08:54

## 2024-09-17 RX ADMIN — Medication 1 TABLET: at 09:41

## 2024-09-17 NOTE — ASSESSMENT & PLAN NOTE
- historical diagnosis per collateral collected from mom, will hold off on addressing this concern at this time in this acute setting
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
## 2024-09-17 NOTE — TELEPHONE ENCOUNTER
Called pt to offer appointment on Monday September 23rd at 9:30am. Pt scheduled but needs to confirm. Will call again. If patient calls, please forward the call to [place] office.

## 2024-09-17 NOTE — PLAN OF CARE
Problem: PAIN - ADULT  Goal: Verbalizes/displays adequate comfort level or baseline comfort level  Description: Interventions:  - Encourage patient to monitor pain and request assistance  - Assess pain using appropriate pain scale  - Administer analgesics based on type and severity of pain and evaluate response  - Implement non-pharmacological measures as appropriate and evaluate response  - Consider cultural and social influences on pain and pain management  - Notify physician/advanced practitioner if interventions unsuccessful or patient reports new pain  9/17/2024 1556 by Alondra Flynn RN  Outcome: Completed  9/17/2024 1522 by Alondra Flynn RN  Outcome: Progressing     Problem: INFECTION - ADULT  Goal: Absence or prevention of progression during hospitalization  Description: INTERVENTIONS:  - Assess and monitor for signs and symptoms of infection  - Monitor lab/diagnostic results  - Monitor all insertion sites, i.e. indwelling lines, tubes, and drains  - Monitor endotracheal if appropriate and nasal secretions for changes in amount and color  - [place] appropriate cooling/warming therapies per order  - Administer medications as ordered  - Instruct and encourage patient and family to use good hand hygiene technique  - Identify and instruct in appropriate isolation precautions for identified infection/condition  9/17/2024 1556 by Alondra Flynn RN  Outcome: Completed  9/17/2024 1522 by Alondra Flynn RN  Outcome: Progressing  Goal: Absence of fever/infection during neutropenic period  Description: INTERVENTIONS:  - Monitor WBC    9/17/2024 1556 by Alondra Flynn RN  Outcome: Completed  9/17/2024 1522 by Alondra Flynn RN  Outcome: Progressing     Problem: SAFETY ADULT  Goal: Patient will remain free of falls  Description: INTERVENTIONS:  - Educate patient/family on patient safety including physical limitations  - Instruct patient to call for assistance with activity   - Consult OT/PT to assist with  strengthening/mobility   - Keep Call bell within reach  - Keep bed low and locked with side rails adjusted as appropriate  - Keep care items and personal belongings within reach  - Initiate and maintain comfort rounds  - Make Fall Risk Sign visible to staff  - Offer Toileting every  Hours, in advance of need  - Initiate/Maintain alarm  - Obtain necessary fall risk management equipment:   - Apply yellow socks and bracelet for high fall risk patients  - Consider moving patient to room near nurses station  9/17/2024 1556 by Alondra Flynn RN  Outcome: Completed  9/17/2024 1522 by Alondra Flynn RN  Outcome: Progressing  Goal: Maintain or return to baseline ADL function  Description: INTERVENTIONS:  -  Assess patient's ability to carry out ADLs; assess patient's baseline for ADL function and identify physical deficits which impact ability to perform ADLs (bathing, care of mouth/teeth, toileting, grooming, dressing, etc.)  - Assess/evaluate cause of self-care deficits   - Assess range of motion  - Assess patient's mobility; develop plan if impaired  - Assess patient's need for assistive devices and provide as appropriate  - Encourage maximum independence but intervene and supervise when necessary  - Involve family in performance of ADLs  - Assess for home care needs following discharge   - Consider OT consult to assist with ADL evaluation and planning for discharge  - Provide patient education as appropriate  9/17/2024 1556 by Alondra Flynn RN  Outcome: Completed  9/17/2024 1522 by Alondra Flynn RN  Outcome: Progressing  Goal: Maintains/Returns to pre admission functional level  Description: INTERVENTIONS:  - Perform AM-PAC 6 Click Basic Mobility/ Daily Activity assessment daily.  - Set and communicate daily mobility goal to care team and patient/family/caregiver.   - Collaborate with rehabilitation services on mobility goals if consulted  - Perform Range of Motion  times a day.  - Reposition patient every  hours.  -  Dangle patient  times a day  - Stand patient  times a day  - Ambulate patient  times a day  - Out of bed to chair  times a day   - Out of bed for meals times a day  - Out of bed for toileting  - Record patient progress and toleration of activity level   9/17/2024 1556 by Alondra Flynn RN  Outcome: Completed  9/17/2024 1522 by Alondra Flynn RN  Outcome: Progressing     Problem: SAFETY ADULT  Goal: Maintain or return to baseline ADL function  Description: INTERVENTIONS:  -  Assess patient's ability to carry out ADLs; assess patient's baseline for ADL function and identify physical deficits which impact ability to perform ADLs (bathing, care of mouth/teeth, toileting, grooming, dressing, etc.)  - Assess/evaluate cause of self-care deficits   - Assess range of motion  - Assess patient's mobility; develop plan if impaired  - Assess patient's need for assistive devices and provide as appropriate  - Encourage maximum independence but intervene and supervise when necessary  - Involve family in performance of ADLs  - Assess for home care needs following discharge   - Consider OT consult to assist with ADL evaluation and planning for discharge  - Provide patient education as appropriate  9/17/2024 1556 by Alondra Flynn RN  Outcome: Completed  9/17/2024 1522 by Alondra Flynn RN  Outcome: Progressing     Problem: DISCHARGE PLANNING  Goal: Discharge to home or other facility with appropriate resources  Description: INTERVENTIONS:  - Identify barriers to discharge w/patient and caregiver  - Arrange for needed discharge resources and transportation as appropriate  - Identify discharge learning needs (meds, wound care, etc.)  - Arrange for interpretive services to assist at discharge as needed  - Refer to Case Management Department for coordinating discharge planning if the patient needs post-hospital services based on physician/advanced practitioner order or complex needs related to functional status, cognitive ability, or social  support system  9/17/2024 1556 by Alondra Flynn RN  Outcome: Completed  9/17/2024 1522 by Alondra Flynn RN  Outcome: Progressing     Problem: Knowledge Deficit  Goal: Patient/family/caregiver demonstrates understanding of disease process, treatment plan, medications, and discharge instructions  Description: Complete learning assessment and assess knowledge base.  Interventions:  - Provide teaching at level of understanding  - Provide teaching via preferred learning methods  9/17/2024 1556 by Alondra Flynn RN  Outcome: Completed  9/17/2024 1522 by Alondra Flynn RN  Outcome: Progressing

## 2024-09-17 NOTE — PROGRESS NOTES
Progress Note - Hospitalist   Name: Cathryn Craig 34 y.o. female I MRN: [id number]  Unit/Bed#: W -01 I Date of Admission: 9/16/2024   Date of Service: 9/17/2024 I Hospital Day: 1    Assessment & Plan  Acetaminophen overdose, intentional self-harm, initial encounter ([facility])  Postpartum depression with suicidal ideation  Patient had 16 ounces (8 ounce of DayQuil, 8 ounces of NyQuil), on 9/15, suicidal ideation  Tylenol lab work showed Tylenol level: 9, (most likely falsely low value)  Rapid drug screen unremarkable    Plan:  Toxicology consult   Patient received loading and maintenance dosing of NAC.  Discontinued by toxicology  One-on-one continue observation  Patient is currently breast-feeding: Minimal studies about N-acetylcysteine and breast-feeding mothers.  Likely possible to continue breast-feeding following treatment however if patient's child is premature or high risk, can consider diluting milk over several days  MDD (major depressive disorder)  Postpartum depression  Patient had hopelessness, helplessness, worthlessness, low mood, suicidal ideation    Plan:  Sertraline can be considered   psychiatry consult  Psychology consult can be considered for CBT  If symptoms does not improve, patient can get benefit from ECT  One-on-one observation    VTE Pharmacologic Prophylaxis: VTE Score: 2 Low Risk (Score 0-2) - Encourage Ambulation.    Mobility:   Basic Mobility Inpatient Raw Score: 24  JH-HLM Goal: 8: Walk 250 feet or more  JH-HLM Achieved: 8: Walk 250 feet ot more  JH-HLM Goal achieved. Continue to encourage appropriate mobility.    Patient Centered Rounds: I performed bedside rounds with nursing staff today.   Discussions with Specialists or Other Care Team Provider: Toxicology, Psychiatry    Education and Discussions with Family / Patient: Patient declined call to .     Current Length of Stay: 1 day(s)  Current Patient Status: Inpatient   Certification Statement: The patient will  continue to require additional inpatient hospital stay due to psychiatric evaluation and safety plan    Discharge Plan: Anticipate discharge in 24-48 hrs to TBD    Code Status: Level 1 - Full Code    Subjective   Patient was examined at bedside.  She is having an intermittent headache but is otherwise feeling well.  She has no other symptom complaints at this time. Patient is wondering if she is able to continue breast-feeding following the treatment for the Tylenol overdose.    Objective     Vitals:   Temp (24hrs), Av.2 °F (36.8 °C), Min:97.6 °F (36.4 °C), Max:98.7 °F (37.1 °C)    Temp:  [97.6 °F (36.4 °C)-98.7 °F (37.1 °C)] 98.2 °F (36.8 °C)  HR:  [77-89] 77  Resp:  [16-18] 18  BP: (117-129)/(83-88) 125/85  SpO2:  [95 %-96 %] 95 %  Body mass index is 28.07 kg/m².     Input and Output Summary (last 24 hours):     Intake/Output Summary (Last 24 hours) at 2024 1325  Last data filed at 2024 0947  Gross per 24 hour   Intake 2379.7 ml   Output 3300 ml   Net -920.3 ml       Physical Exam  Vitals reviewed.   Constitutional:       General: She is not in acute distress.  HENT:      Head: Normocephalic and atraumatic.      Right Ear: External ear normal.      Left Ear: External ear normal.      Nose: Nose normal.      Mouth/Throat:      Mouth: Mucous membranes are moist.   Eyes:      Extraocular Movements: Extraocular movements intact.      Pupils: Pupils are equal, round, and reactive to light.   Cardiovascular:      Rate and Rhythm: Normal rate and regular rhythm.      Heart sounds: Normal heart sounds.   Pulmonary:      Breath sounds: Normal breath sounds.   Abdominal:      General: Abdomen is flat. Bowel sounds are normal.      Palpations: Abdomen is soft.   Musculoskeletal:         General: Normal range of motion.      Cervical back: Normal range of motion.   Skin:     General: Skin is warm and dry.      Capillary Refill: Capillary refill takes less than 2 seconds.   Neurological:      Mental Status: She  is alert and oriented to person, place, and time. Mental status is at baseline.   Psychiatric:         Mood and Affect: Mood normal.         Behavior: Behavior normal.          Lines/Drains:  Lines/Drains/Airways       Active Status       None                      Lab Results: I have reviewed the following results:    Results from last 7 days   Lab Units 09/16/24  1118   WBC Thousand/uL 7.79   HEMOGLOBIN g/dL 13.2   HEMATOCRIT % 39.9   PLATELETS Thousands/uL 230   SEGS PCT % 58   LYMPHO PCT % 30   MONO PCT % 9   EOS PCT % 2     Results from last 7 days   Lab Units 09/17/24  0828   SODIUM mmol/L 140   POTASSIUM mmol/L 4.1   CHLORIDE mmol/L 106   CO2 mmol/L 29   BUN mg/dL 8   CREATININE mg/dL 0.63   ANION GAP mmol/L 5   CALCIUM mg/dL 9.3   ALBUMIN g/dL 4.0   TOTAL BILIRUBIN mg/dL 0.39   ALK PHOS U/L 85   ALT U/L 20   AST U/L 17   GLUCOSE RANDOM mg/dL 124     Results from last 7 days   Lab Units 09/17/24  0828   INR  0.98                   Recent Cultures (last 7 days):         Imaging Review: No pertinent imaging studies reviewed.  Other Studies: No additional pertinent studies reviewed.    Last 24 Hours Medication List:     Current Facility-Administered Medications:     fish oil capsule 2,000 mg, Daily    magnesium carbonate (Magonate) oral liquid 54 mg, Daily    nicotine (NICODERM CQ) 21 mg/24 hr TD 24 hr patch 1 patch, Daily    ondansetron (ZOFRAN) injection 4 mg, Q6H PRN    polyethylene glycol (MIRALAX) packet 17 g, Daily PRN    prenatal multivitamin tablet 1 tablet, Daily    sertraline (ZOLOFT) tablet 25 mg, Daily    Administrative Statements   Today, Patient Was Seen By: Karina Christian MD      **Please Note: This note may have been constructed using a voice recognition system.**
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
## 2024-09-17 NOTE — QUICK NOTE
Med Tox Follow Up    Labs reviewed. Transaminases are normal this AM with an APAP concentration less than 10. Therefore, no further NAC is needed. I have discontinued it. The patient is clear from a med tox standpoint.
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
## 2024-09-17 NOTE — PLAN OF CARE
Problem: PAIN - ADULT  Goal: Verbalizes/displays adequate comfort level or baseline comfort level  Description: Interventions:  - Encourage patient to monitor pain and request assistance  - Assess pain using appropriate pain scale  - Administer analgesics based on type and severity of pain and evaluate response  - Implement non-pharmacological measures as appropriate and evaluate response  - Consider cultural and social influences on pain and pain management  - Notify physician/advanced practitioner if interventions unsuccessful or patient reports new pain  Outcome: Progressing     Problem: INFECTION - ADULT  Goal: Absence or prevention of progression during hospitalization  Description: INTERVENTIONS:  - Assess and monitor for signs and symptoms of infection  - Monitor lab/diagnostic results  - Monitor all insertion sites, i.e. indwelling lines, tubes, and drains  - Monitor endotracheal if appropriate and nasal secretions for changes in amount and color  - Edwards appropriate cooling/warming therapies per order  - Administer medications as ordered  - Instruct and encourage patient and family to use good hand hygiene technique  - Identify and instruct in appropriate isolation precautions for identified infection/condition  Outcome: Progressing  Goal: Absence of fever/infection during neutropenic period  Description: INTERVENTIONS:  - Monitor WBC    Outcome: Progressing     Problem: SAFETY ADULT  Goal: Patient will remain free of falls  Description: INTERVENTIONS:  - Educate patient/family on patient safety including physical limitations  - Instruct patient to call for assistance with activity   - Consult OT/PT to assist with strengthening/mobility   - Keep Call bell within reach  - Keep bed low and locked with side rails adjusted as appropriate  - Keep care items and personal belongings within reach  - Initiate and maintain comfort rounds  - Make Fall Risk Sign visible to staff  - Offer Toileting every  Hours,  in advance of need  - Initiate/Maintain alarm  - Obtain necessary fall risk management equipment:   - Apply yellow socks and bracelet for high fall risk patients  - Consider moving patient to room near nurses station  Outcome: Progressing  Goal: Maintain or return to baseline ADL function  Description: INTERVENTIONS:  -  Assess patient's ability to carry out ADLs; assess patient's baseline for ADL function and identify physical deficits which impact ability to perform ADLs (bathing, care of mouth/teeth, toileting, grooming, dressing, etc.)  - Assess/evaluate cause of self-care deficits   - Assess range of motion  - Assess patient's mobility; develop plan if impaired  - Assess patient's need for assistive devices and provide as appropriate  - Encourage maximum independence but intervene and supervise when necessary  - Involve family in performance of ADLs  - Assess for home care needs following discharge   - Consider OT consult to assist with ADL evaluation and planning for discharge  - Provide patient education as appropriate  Outcome: Progressing  Goal: Maintains/Returns to pre admission functional level  Description: INTERVENTIONS:  - Perform AM-PAC 6 Click Basic Mobility/ Daily Activity assessment daily.  - Set and communicate daily mobility goal to care team and patient/family/caregiver.   - Collaborate with rehabilitation services on mobility goals if consulted  - Perform Range of Motion  times a day.  - Reposition patient every  hours.  - Dangle patient  times a day  - Stand patient  times a day  - Ambulate patient  times a day  - Out of bed to chair  times a day   - Out of bed for meals times a day  - Out of bed for toileting  - Record patient progress and toleration of activity level   Outcome: Progressing     Problem: DISCHARGE PLANNING  Goal: Discharge to home or other facility with appropriate resources  Description: INTERVENTIONS:  - Identify barriers to discharge w/patient and caregiver  - Arrange for  needed discharge resources and transportation as appropriate  - Identify discharge learning needs (meds, wound care, etc.)  - Arrange for interpretive services to assist at discharge as needed  - Refer to Case Management Department for coordinating discharge planning if the patient needs post-hospital services based on physician/advanced practitioner order or complex needs related to functional status, cognitive ability, or social support system  Outcome: Progressing     Problem: Knowledge Deficit  Goal: Patient/family/caregiver demonstrates understanding of disease process, treatment plan, medications, and discharge instructions  Description: Complete learning assessment and assess knowledge base.  Interventions:  - Provide teaching at level of understanding  - Provide teaching via preferred learning methods  Outcome: Progressing

## 2024-09-17 NOTE — UTILIZATION REVIEW
NOTIFICATION OF INPATIENT ADMISSION   AUTHORIZATION REQUEST   SERVICING FACILITY:   [place] [address]  Tax ID: [id number]  NPI: [id number]   ATTENDING PROVIDER:  Attending Name and NPI#: Bianca Walter Md [[id number]]  Address: [address] [place] [address]  Phone: [phone]     ADMISSION INFORMATION:  Place of Service: Inpatient [facility]  Place of Service Code: 21  Inpatient Admission Date/Time: 9/16/24  1:08 PM  Discharge Date/Time: No discharge date for patient encounter.  Admitting Diagnosis Code/Description:  Attempted suicide (HCC) [T14.91XA]  Encounter for other general examination [Z00.8]  Acetaminophen overdose, intentional self-harm, initial encounter (HCC) [T39.1X2A]     UTILIZATION REVIEW CONTACT:  Christina Dahl Utilization   Network Utilization Review Department  Phone: [phone]  Fax: [phone]  Email: [email or web address]  Contact for approvals/pending authorizations, clinical reviews, and discharge.     PHYSICIAN ADVISORY SERVICES:  Medical Necessity Denial & Hucl-bq-Dquc Review  Phone: [phone]  Fax: [phone]  Email: [email or web address]     DISCHARGE SUPPORT TEAM:  For Patients Discharge Needs & Updates  Phone: [phone] opt. 2 Fax: [phone]  Email: [email or web address]

## 2024-09-17 NOTE — ASSESSMENT & PLAN NOTE
Postpartum depression with suicidal ideation  Patient had 16 ounces (8 ounce of DayQuil, 8 ounces of NyQuil), on 9/15, suicidal ideation  Tylenol lab work showed Tylenol level: 9, (most likely falsely low value)  Rapid drug screen unremarkable    Plan:  Toxicology consult   Patient received loading and maintenance dosing of NAC.  Discontinued by toxicology  One-on-one continue observation  Patient is currently breast-feeding: Minimal studies about N-acetylcysteine and breast-feeding mothers.  Likely possible to continue breast-feeding following treatment however if patient's child is premature or high risk, can consider diluting milk over several days

## 2024-09-17 NOTE — ASSESSMENT & PLAN NOTE
Postpartum depression  Patient had hopelessness, helplessness, worthlessness, low mood, suicidal ideation    Plan:  Sertraline 25 mg daily   Psychiatry consult: Recommend Partial Hospitalization Program.
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
## 2024-09-17 NOTE — DISCHARGE INSTR - AVS FIRST PAGE
Dear Cathryn Craig,     It was our pleasure to care for you here at [facility].  It is our hope that we were always able to exceed the expected standards for your care during your stay.  You were hospitalized due to tylenol overdose.  You were cared for on the 4th west floor by Karina Christian MD under the service of Bianca Walter MD with the [facility] who covers for your primary care physician (PCP), Marleen Franklin MD, while you were hospitalized.  If you have any questions or concerns related to this hospitalization, you may contact us at .  For follow up as well as any medication refills, we recommend that you follow up with your primary care physician.  A registered nurse will reach out to you by phone within a few days after your discharge to answer any additional questions that you may have after going home.  However, at this time we provide for you here, the most important instructions / recommendations at discharge:     Notable Medication Adjustments -   START Zoloft 25 mg daily  Important follow up information -   Follow-up with your primary care physician in 1 week (If you do not have a primary care physician, referral was provided for appointment with [facility] or [facility])  Please follow-up with OB/GYN on 9/23/24  You should be receiving a call for partial hospitalization for psych to set up program.   Please review this entire after visit summary as additional general instructions including medication list, appointments, activity, diet, any pertinent wound care, and other additional recommendations from your care team that may be provided for you.      Sincerely,     Karina Christian MD
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
## 2024-09-17 NOTE — PLAN OF CARE
Problem: PAIN - ADULT  Goal: Verbalizes/displays adequate comfort level or baseline comfort level  Description: Interventions:  - Encourage patient to monitor pain and request assistance  - Assess pain using appropriate pain scale  - Administer analgesics based on type and severity of pain and evaluate response  - Implement non-pharmacological measures as appropriate and evaluate response  - Consider cultural and social influences on pain and pain management  - Notify physician/advanced practitioner if interventions unsuccessful or patient reports new pain  Outcome: Progressing     Problem: INFECTION - ADULT  Goal: Absence or prevention of progression during hospitalization  Description: INTERVENTIONS:  - Assess and monitor for signs and symptoms of infection  - Monitor lab/diagnostic results  - Monitor all insertion sites, i.e. indwelling lines, tubes, and drains  - Monitor endotracheal if appropriate and nasal secretions for changes in amount and color  - [place] appropriate cooling/warming therapies per order  - Administer medications as ordered  - Instruct and encourage patient and family to use good hand hygiene technique  - Identify and instruct in appropriate isolation precautions for identified infection/condition  Outcome: Progressing  Goal: Absence of fever/infection during neutropenic period  Description: INTERVENTIONS:  - Monitor WBC    Outcome: Progressing     Problem: SAFETY ADULT  Goal: Patient will remain free of falls  Description: INTERVENTIONS:  - Educate patient/family on patient safety including physical limitations  - Instruct patient to call for assistance with activity   - Consult OT/PT to assist with strengthening/mobility   - Keep Call bell within reach  - Keep bed low and locked with side rails adjusted as appropriate  - Keep care items and personal belongings within reach  - Initiate and maintain comfort rounds  - Make Fall Risk Sign visible to staff  - Offer Toileting every  Hours,  in advance of need  - Initiate/Maintain alarm  - Obtain necessary fall risk management equipment:   - Apply yellow socks and bracelet for high fall risk patients  - Consider moving patient to room near nurses station  Outcome: Progressing  Goal: Maintain or return to baseline ADL function  Description: INTERVENTIONS:  -  Assess patient's ability to carry out ADLs; assess patient's baseline for ADL function and identify physical deficits which impact ability to perform ADLs (bathing, care of mouth/teeth, toileting, grooming, dressing, etc.)  - Assess/evaluate cause of self-care deficits   - Assess range of motion  - Assess patient's mobility; develop plan if impaired  - Assess patient's need for assistive devices and provide as appropriate  - Encourage maximum independence but intervene and supervise when necessary  - Involve family in performance of ADLs  - Assess for home care needs following discharge   - Consider OT consult to assist with ADL evaluation and planning for discharge  - Provide patient education as appropriate  Outcome: Progressing  Goal: Maintains/Returns to pre admission functional level  Description: INTERVENTIONS:  - Perform AM-PAC 6 Click Basic Mobility/ Daily Activity assessment daily.  - Set and communicate daily mobility goal to care team and patient/family/caregiver.   - Collaborate with rehabilitation services on mobility goals if consulted  - Perform Range of Motion  times a day.  - Reposition patient every  hours.  - Dangle patient  times a day  - Stand patient  times a day  - Ambulate patient  times a day  - Out of bed to chair  times a day   - Out of bed for meals  times a day  - Out of bed for toileting  - Record patient progress and toleration of activity level   Outcome: Progressing     Problem: DISCHARGE PLANNING  Goal: Discharge to home or other facility with appropriate resources  Description: INTERVENTIONS:  - Identify barriers to discharge w/patient and caregiver  - Arrange for  needed discharge resources and transportation as appropriate  - Identify discharge learning needs (meds, wound care, etc.)  - Arrange for interpretive services to assist at discharge as needed  - Refer to Case Management Department for coordinating discharge planning if the patient needs post-hospital services based on physician/advanced practitioner order or complex needs related to functional status, cognitive ability, or social support system  Outcome: Progressing     Problem: Knowledge Deficit  Goal: Patient/family/caregiver demonstrates understanding of disease process, treatment plan, medications, and discharge instructions  Description: Complete learning assessment and assess knowledge base.  Interventions:  - Provide teaching at level of understanding  - Provide teaching via preferred learning methods  Outcome: Progressing

## 2024-09-18 ENCOUNTER — PATIENT OUTREACH (OUTPATIENT)
Dept: CASE MANAGEMENT | Facility: OTHER | Age: 34
End: 2024-09-18

## 2024-09-18 DIAGNOSIS — Z91.89 HIGH RISK FOR READMISSION: ICD-10-CM

## 2024-09-18 DIAGNOSIS — Z13.9 ENCOUNTER FOR SCREENING INVOLVING SOCIAL DETERMINANTS OF HEALTH (SDOH): Primary | ICD-10-CM

## 2024-09-18 NOTE — PROGRESS NOTES
HERMELINDA MANRIQUE received a referral from  CM Shireen Hoffman RN. However, no reason for referral provided. Per chart review, patient is about a month post partum and recent Suicide attempt. Patient was referred by Shireen Hoffman to the [facility] Intensive outpatient mental health program with [facility]. HERMELINDA MANRIQUE called patient ([phone]). Patient did not answer and does not have a voice mail box set up. HERMELINDA MANRIQUE will attempt to outreach again at a later date.

## 2024-09-18 NOTE — UTILIZATION REVIEW
NOTIFICATION OF ADMISSION DISCHARGE   This is a Notification of Discharge from [facility]. Please be advised that this patient has been discharge from our facility. Below you will find the admission and discharge date and time including the patient’s disposition.   UTILIZATION REVIEW CONTACT:  Christina Dahl  Utilization   Network Utilization Review Department  Phone: [phone] x carefully listen to the prompts. All voicemails are confidential.  Email: [email or web address]     ADMISSION INFORMATION  PRESENTATION DATE: 9/16/2024  9:40 AM  OBERVATION ADMISSION DATE: N/A  INPATIENT ADMISSION DATE: 9/16/24  1:08 PM   DISCHARGE DATE: 9/17/2024  5:22 PM   DISPOSITION:Home/Self Care    Network Utilization Review Department  ATTENTION: Please call with any questions or concerns to [phone] and carefully listen to the prompts so that you are directed to the right person. All voicemails are confidential.   For Discharge needs, contact Care Management DC Support Team at [phone] opt. 2  Send all requests for admission clinical reviews, approved or denied determinations and any other requests to dedicated fax number below belonging to the campus where the patient is receiving treatment. List of dedicated fax numbers for the Facilities:  FACILITY NAME UR FAX NUMBER   ADMISSION DENIALS (Administrative/Medical Necessity) [phone]   DISCHARGE SUPPORT TEAM ([facility]) [phone]   PARENT CHILD HEALTH (Maternity/NICU/Pediatrics) [phone]   [facility] [phone]   [facility] [phone]   Counts include 234 beds at the [facility] [phone]   [facility] [phone]   [facility] [phone]   [facility] [phone]   General acute hospital [phone]   [facility] [phone]    [facility] [phone]   [facility] [phone]   [facility] [phone]   [facility] [phone]

## 2024-09-19 ENCOUNTER — PATIENT OUTREACH (OUTPATIENT)
Dept: CASE MANAGEMENT | Facility: OTHER | Age: 34
End: 2024-09-19

## 2024-09-19 NOTE — PROGRESS NOTES
HERMELINDA MANRIQUE called patient a second time to follow up regarding outpatient mental health resources and any other resources that would be a support for patient. HERMELINDA MANRIQUE called patient ([phone]) and patient answered. HERMELINDA MANRIQUE introduced role and reason for calling. Patient stated she will be starting with [facility] tomorrow. She does have a cse worker assigned to her. Patient was agreeable to HERMELINDA MANRIQUE completing a SOC psychosocial assessment and sending her resources.     Patient moved to [place] in February. She is recently post partum and not working. 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 HERMELINDA MANRIQUE discussed dannie hanson, patient agreeable to HERMELINDA MANRIQUE sending her resources via find help. Patient is behind on credit cards and loans but no other necessities. She reports living with her significant other who is currently working. SO is covering all expenses at this time. They are not behind on any bills. She was previously receiving SNAP but due to being in same household with SO they are now over income.Her SO drives her to appointments as well. She does not have her drivers license, but Is hoping to get it soon. HERMELINDA MANRIQUE will send patient resources to her confirmed e-mail ([email or web address]) but HERMELINDA MNARIQUE will close as patient has  from [facility] following up with her. She will contact HERMELINDA MANRIQUE should she need any additional support.     Patient was referred on Findhelp to [employer] (program) for work

## 2024-09-20 ENCOUNTER — TELEMEDICINE (OUTPATIENT)
Dept: PSYCHOLOGY | Facility: CLINIC | Age: 34
End: 2024-09-20
Payer: COMMERCIAL

## 2024-09-20 ENCOUNTER — TELEMEDICINE (OUTPATIENT)
Dept: PSYCHIATRY | Facility: CLINIC | Age: 34
End: 2024-09-20
Payer: COMMERCIAL

## 2024-09-20 DIAGNOSIS — F16.91 HALLUCINOGEN USE DISORDER IN REMISSION: ICD-10-CM

## 2024-09-20 DIAGNOSIS — F43.21 ADJUSTMENT DISORDER WITH DEPRESSED MOOD: Primary | ICD-10-CM

## 2024-09-20 DIAGNOSIS — F41.1 GENERALIZED ANXIETY DISORDER: ICD-10-CM

## 2024-09-20 DIAGNOSIS — T14.91XA ATTEMPTED SUICIDE (HCC): ICD-10-CM

## 2024-09-20 DIAGNOSIS — F41.1 GAD (GENERALIZED ANXIETY DISORDER): ICD-10-CM

## 2024-09-20 PROCEDURE — 90792 PSYCH DIAG EVAL W/MED SRVCS: CPT | Performed by: STUDENT IN AN ORGANIZED HEALTH CARE EDUCATION/TRAINING PROGRAM

## 2024-09-20 PROCEDURE — H0035 MH PARTIAL HOSP TX UNDER 24H: HCPCS

## 2024-09-20 RX ORDER — AMOXICILLIN 500 MG/1
CAPSULE ORAL
COMMUNITY
Start: 2024-09-18

## 2024-09-20 RX ORDER — SERTRALINE HYDROCHLORIDE 25 MG/1
25 TABLET, FILM COATED ORAL DAILY
Qty: 30 TABLET | Refills: 0 | Status: SHIPPED | OUTPATIENT
Start: 2024-09-20 | End: 2024-09-26 | Stop reason: DRUGHIGH

## 2024-09-20 NOTE — PSYCH
Visit Time    Visit Start Time: 1045  Visit Stop Time: 1145  Total Visit Duration:  60 minutes    Subjective:     Patient ID: Cathryn Craig is a 34 y.o. female.    [facility] Clinical Progress Notes      Specialized Services Documentation  Therapist must complete separate progress note for each specific clinical activity in which the individual participated during the day.       Group Psychotherapy 1045-1145 Group was facilitated virtually in a private office using HIPAA Compliant and Approved [employer] Teams. Cathryn Craig consented to the use of tele-video modality of treatment and was virtually present for group psychotherapy today.  Cathryn attentively listened to Mackenzie chowdary [facility] share her life story as she co-led this session.  Group encouraged power of learning about self, accepting illness and personal responsibility in recovery.  Community resources reviewed in addition to personal resources like the affirmations.  Progress toward goals noted.  Continue psychotherapy to encourage self -awareness and healthy engagement of supports.   Tx Plan Objective: n/a, Therapist:  JEREMIAH Pineda, [facility]

## 2024-09-20 NOTE — PSYCH
Visit Time    Visit Start Time: 0830  Visit Stop Time: 0930  Total Visit Duration:  0 minutes    Subjective:     Patient ID: Cathryn Craig is a 34 y.o. female.    [facility] Clinical Progress Notes      Specialized Services Documentation  Therapist must complete separate progress note for each specific clinical activity in which the individual participated during the day.       EDUCATION THERAPY      Cathryn Craig was in intake at this time.    Tx Plan Objective: 1.1, 1.2, 1.3, 1.4 , Therapist: Balaji Christine    Visit Time    Visit Start Time: 1330  Visit Stop Time: 1430  Total Visit Duration:  60 minutes    Subjective:     Patient ID: Cathryn Craig is a 34 y.o. female.    [facility] Clinical Progress Notes      Specialized Services Documentation  Therapist must complete separate progress note for each specific clinical activity in which the individual participated during the day.       GROUP PSYCHOTHERAPY    Cathryn Craig participated actively in psychotherapy group.  This was observed Consistent throughout the treatment day. They were engaged in learning related to Wellness Tools. Staff utilized Verbal and Demonstration teaching methods.  Cathryn Craig shared area of learning and set a goal for outside of program to give the baby a bath.  Cathryn Craig was able to share items explored during the day and shared in adding to their WRAP.  Ended session with staff led exercise on belly breathing.  Cathryn Craig demonstrated positive progress toward goal.  Continue group psychotherapy to actively practice learned skills and encourage transfer of knowledge to unstructured time.    Tx Plan Objective: 1.1, 1.2, 1.3, 1.4 , Therapist: Balaji Christine    Group Therapy:      Visit Start Time: (1215)  Visit Stop Time: (1315)  Total Visit Duration:  60 minutes    Subjective:     Patient ID: Cathryn Craig 34 y.o.   This group was facilitated virtually in a private office using HIPAA Compliant and Approved  [employer] [email or web address] consented to the use of tele-video modality of treatment.  Cathryn Craig actively engaged in psychoeducational group about the PLEASE skill. The skill helps to maintain and regulate emotional expression/regulation. Group discovered strategies that help them to take care of physical and emotional well being on a short term and long term basis. The group talked about understanding the purpose, and meaning of self care in regards to physical, intellectual, and emotional expression, regulation , and recognition, and how it affects themselves and others.. Teaching on the emphasis of self monitoring and self-care, who the group can go to for help was brought up as well. Group was encouraged to ask questions in an open forum at the end of group. Positive progress displayed through engagement in topic, Cathryn was able to identify seveal coping skills that she would like to start incorporating into her daily routines. Pt will continue to engage in psychotherapy to encourage self realization in treatment.  Treatment Plan Objective 1.1, 1.2, 1.3, 1.4 Therapist: Balaji KULKARNI Ed.
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
## 2024-09-20 NOTE — PSYCH
Visit Time    Visit Start Time: 0931  Visit Stop Time: 1032  Total Visit Duration:  60 minutes      Subjective:     Patient ID: Cathryn Craig is a 34 y.o. female.    [facility] Clinical Progress Notes      Specialized Services Documentation  Therapist must complete separate progress note for each specific clinical activity in which the individual participated during the day.       Case Management Note    Mireya SANCHEZ RN    Current suicide risk : Low        Met with Cathryn Craig in a private office using HIPAA Compliant and Approved Microsoft Teams.  Cathryn Craig consented to the use of tele-video modality of treatment.   Reviewed program, program guidelines, initial paperwork reviewed: Consent for Treatment, PHP handbook, HIPPA, General Consent, Client Bill of Rights, and Smoking/Drug and Alcohol Policy. Release of Information obtained for emergency contact - boyfriend Berna Bowman ([phone]) and PCP and Health Care Coordination Form. Cathryn Craig has hard copies of all paperwork and verbally gave consent. Reviewed and given on call number. PCP notified of admission and health care coordination form sent. Completed initial psycho-social evaluation and initial treatment goals discussed. Cathryn reports she will need to be off group on Monday due a first time post partum appointment at 0930.        Medications changes/added/denied? No - See Dr. Saurabh Sauceda note    Treatment session number: Day 1     Individual Case Management Visit provided today? yes    [facility] follow up physician's orders: Admit to PHP - See Dr. Saurabh Sauceda note
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
## 2024-09-20 NOTE — BH TREATMENT PLAN
"    Assessment/Plan:      Diagnoses and all orders for this visit:    Adjustment disorder with depressed mood    KAREN (generalized anxiety disorder)    Hallucinogen use disorder in remission    Attempted suicide ([facility])          Subjective:     Patient ID: Cathryn Craig is a 34 y.o. female.    [facility] Treatment Plan   AREAS OF NEED: Symptoms of adjustment disorder with depressed mood, KAREN as evidenced by lack of motivation, low self confidence, worthlessness, feeling lost, confused on how to contribute, body dysmorphia, lack of routine, feeling overwhelmed, shakiness and some situational ruminations due to relationship and financial stressors.  Date Initiated: 09/20/24    Strengths: \"Listening to others, understanding how they feel, communication and solving problems\"      LONG TERM GOAL:   Date Initiated: 09/20/24  1.0  While at [facility], I will gain new skills/techniques/education/support/insights which I can use daily to decrease my symptoms and increase my quality of life.  Target Date: 10/18/2024  Completion Date:       SHORT TERM OBJECTIVES:     Date Initiated: 09/20/24  1.1 I will learn and practice daily a new thought stopping technique to improve my day-to-day functioning.   Revision Date:   Target Date: 10/01/2024  Completion Date:     Date Initiated: 09/20/24  1.2 I will earn three ways to communicate verbally when experiencing my emotions and share implementation of skills daily to build upon relationships.  Revision Date:   Target Date: 10/01/2024  Completion Date:    Date Initiated: 09/20/24  1.3 I will take medications as prescribed and share questions and concerns if arise.    Revision Date:  Target Date: 10/01/2024  Completion Date:     Date Initiated: 09/20/24  1.4 I will identify 3 ways my supports can assist in my recovery and agree to staff/support contact as indicated.    Revision Date:  Target Date: 10/01/2024  Completion Date:          7 DAY REVISION:    Date Initiated:  Revision Date: "   Target Date:   Completion Date:      PSYCHIATRY:  Date Initiated:  09/20/24  Medication Management and Education       Revision Date:       The person(s) responsible for carrying out the plan is Tre Landon DO; Siri Pope PA-C    NURSING/SYMPTOM EDUCATION:   Date Initiated: 09/20/24  1.1, 1.2. 1.3, 1.4 This RN/Or Therapist will provide wellness/symptoms and skill education groups three to five days weekly to educate Cathryn Craig on signs and symptoms of diagnoses, skills to manage, and medication questions that will be addressed by the treatment team.    Revision date:  The person(s) responsible for carrying out the plan is LAURA Garcia RN; Balaji KULKARNI Ed; Neeta POOLE    PSYCHOLOGY:   Date Initiated: 09/20/24       1.1, 1.2, 1.4 Provide psychotherapy group 5 times per week to allow opportunity for Cathryn Craig  to explore stressors and ways of coping.   Revision Date:   The person(s) responsible for carrying out the plan is JASON Hall,LSW; Cherry Hendrickson MA LMT    ALLIED THERAPY:   Date Initiated: 09/20/24  1.1,1.2 Engage Cathryn Craig in AT group 5 times weekly to encourage development and use of wellness tools to decrease symptoms and promote recovery through meaningful activity.  Revision Date:       The person(s) responsible for carrying out the plan is FREEMAN CarlisleBC; Adam Patel MT-BC    CASE MANAGEMENT:   Date Initiated: 09/20/24      1.0 This  will meet with Cathryn Navarretejh  3-4 times weekly to assess treatment progress, discharge planning, connection to community supports and UR as indicated.  Revision Date:   The person(s) responsible for carrying out the plan is Mireya SANCHEZ RN    TREATMENT REVIEW/COMMENTS:     DISCHARGE CRITERIA: Identify 3 signs of progress and complete relapse prevention plan.    DISCHARGE PLAN: Connect with identified outpatient providers.   Estimated Length of Stay: 10 treatment days            Diagnosis and Treatment Plan explained to Cathryn, Cathryn relates understanding diagnosis and is agreeable to Treatment Plan.         CLIENT COMMENTS / Please share your thoughts, feelings, need and/or experiences regarding your treatment plan with Staff.  Please see follow up note with comments.      Signatures can be found on [facility] Treatment plan consent form.

## 2024-09-20 NOTE — PSYCH
[facility] Insurance Authorization for Treatment        Subjective:     Patient ID: Cathryn Craig is a 34 y.o. female.    Authorization request submitted electronically via [employer] by Adam Patel.   Tax ID and/or NPI used [id number] [id number] ([employer] [place])  Location: [address] [place] [address]  Code Used for Authorization: 73449 &  ([facility]. Maddie-ALL/Cigna/UHC/UUMPC/Aetna/UMR)  Level of Care PHP    Authorization # [id number]      Therapist: Mireya Nguyen, RN, BSN

## 2024-09-20 NOTE — PSYCH
Virtual Regular Visit    Verification of patient location:    Patient is located at Home in the following state in which I hold an active license [place]      Assessment/Plan:    Problem List Items Addressed This Visit       Adjustment disorder with depressed mood - Primary    KAREN (generalized anxiety disorder)    Hallucinogen use disorder in remission    Attempted suicide (HCC)       Goals addressed in session: PHP VIRTUAL GROUP DUE TO virtual preference of care           Reason for visit is No chief complaint on file.       Encounter provider  PARTIAL PSYCH PROGRAM      Recent Visits  No visits were found meeting these conditions.  Showing recent visits within past 7 days and meeting all other requirements  Future Appointments  No visits were found meeting these conditions.  Showing future appointments within next 150 days and meeting all other requirements       The patient was identified by name and date of birth. Cathryn Craig was informed that this is a telemedicine visit and that the visit is being conducted throughthe Microsoft Teams platform. She agrees to proceed..  My office door was closed. No one else was in the room.  She acknowledged consent and understanding of privacy and security of the video platform. The patient has agreed to participate and understands they can discontinue the visit at any time.    Patient is aware this is a billable service.     Subjective  Cathryn Craig is a 34 y.o. female I spent FIVE GROUP HOURS PLUS CASE MANAGEMENT minutes with patient today in which greater than 50% of time was spent in counseling/coordination of care regarding PHP - SEE NOTES  .      HPI     Past Medical History:   Diagnosis Date    Abnormal Pap smear of cervix     last pap 1/2024    Depression     no meds    Varicella     Vitiligo        Past Surgical History:   Procedure Laterality Date    REMOVAL OF INTRAUTERINE DEVICE (IUD)      5 year ago       Current Outpatient Medications   Medication Sig  Dispense Refill    amoxicillin (AMOXIL) 500 mg capsule       fish oil-omega-3 fatty acids 1000 MG capsule Take 2 g by mouth daily      MAGNESIUM PO Take by mouth      Prenatal Multivit-Min-Fe-FA (PRE-ADEEL PO) Take by mouth      sertraline (ZOLOFT) 25 mg tablet Take 1 tablet (25 mg total) by mouth daily 30 tablet 0     No current facility-administered medications for this visit.        No Known Allergies    Review of Systems    Video Exam    There were no vitals filed for this visit.    Physical Exam     Visit Time    Visit Start Time: 830  Visit Stop Time: 1430  Total Visit Duration:  300 minutes

## 2024-09-20 NOTE — PSYCH
Visit Time    Visit Start Time: 0930  Visit Stop Time: 1030  Total Visit Duration: 0 minutes    Subjective:     Patient ID: Cathryn Craig is a 34 y.o. female.    [facility] Clinical Progress Notes      Specialized Services Documentation  Therapist must complete separate progress note for each specific clinical activity in which the individual participated during the day.       Group Psychotherapy Life Skills (0930-1030) Cathryn Craig was not present for group due to intake. Tx Plan Objective: 1.1,1.2 Therapist: JASON Hall,LSW

## 2024-09-22 NOTE — PROGRESS NOTES
"OB POSTPARTUM VISIT PROGRESS NOTE  Date of Encounter: 2024    Cathryn Craig    : 1990  (34 y.o.)  MR: [id number]    Subjective   Cathryn is in for her postpartum visit. She is now . She delivered by normal spontaneous vaginal delivery. She's generally doing well, denies current pain or bleeding issues, and has no significant depression issues.  Pt is breast feeding. We discussed all appropriate contraceptive options and she chooses None. Pt aware that she needs to consider BC at this point. She has resumed IC. Advised a the minimum she should be planning condoms and or withdrawal for BC. She is not interested in any hormonal BC but will discuss options with her partner and plan let us know if she desires additional BC.     Postpartum Depression: High Risk (2024)    [facility]  Depression Scale     Last EPDS Total Score: 8     Last EPDS Self Harm Result: Hardly ever     We reviewed EPDS. Pt notes that she has had a long standing history of depression and anxiety. She does note feel that the pregnancy/delivery has worsened this. She recently started seeing a therapist through an intense outpatient program. She sees a therapist daily for the last 2 weeks. She was also just started on Zoloft last week and is optimistic this will be beneficial for her.   Pt also notes that she has a h/o alcohol abuse in the past. She admits to binge drinking over the weekend. Encouraged to have support for baby while she is drinking. She notes she did not breast feed while drinking. I encouraged her to talk to her mental health team about her history of alcohol abuse and current drinking habits. Pt agreeable.         Objective   EXAM:  GENERAL: alert, well appearing, and in no distress.  VITALS: Blood pressure 120/72, height 5' 7\" (1.702 m), weight 77.1 kg (170 lb), last menstrual period 10/29/2023, currently breastfeeding.  BMI: Body mass index is 26.63 kg/m².  NECK/THYROID: Commentno " thyromegaly  HEART: RRR  LUNGS: Clear to auscultation.  BACK: Normal spine, no CVAT.  BREASTS: deferred  ABDOMEN: Regular  EXTREMITIES: All normal.  PELVIC:   VULVA: Nml EGBUS.   VAGINA: Introitus well healed, slightly tender.   CERVIX: Normal, no discharge.   UTERUS: Anteverted, NSSC, [place], NT.   RIGHT ADNEXUM: Nontender, no mass.   LEFT ADNEXUM: Nontender, no mass.   RECTAL: Deferred.    Assessment & Plan   Diagnoses and all orders for this visit:    Postpartum care and examination  Comments:  Consider BC options and f/u prn. 3 mths for APE.   I will have our OB nurse navigators stay in close touch regarding her pp course & etoh use and mental health    Episode of recurrent major depressive disorder, unspecified depression episode severity (HCC)  Comments:  Continue close care with mental health team    Engages in binge consumption of alcohol  Comments:  will follow w mental health team and  for support for current etoh use habits. I have reached out to her psych team to keep them in the loop as well.        Charlotte Blair PA-C

## 2024-09-23 ENCOUNTER — DOCUMENTATION (OUTPATIENT)
Dept: PSYCHOLOGY | Facility: CLINIC | Age: 34
End: 2024-09-23

## 2024-09-23 ENCOUNTER — APPOINTMENT (OUTPATIENT)
Dept: PSYCHOLOGY | Facility: CLINIC | Age: 34
End: 2024-09-23
Payer: COMMERCIAL

## 2024-09-23 ENCOUNTER — POSTPARTUM VISIT (OUTPATIENT)
Dept: OBGYN CLINIC | Facility: CLINIC | Age: 34
End: 2024-09-23
Payer: COMMERCIAL

## 2024-09-23 VITALS
WEIGHT: 170 LBS | HEIGHT: 67 IN | SYSTOLIC BLOOD PRESSURE: 120 MMHG | DIASTOLIC BLOOD PRESSURE: 72 MMHG | BODY MASS INDEX: 26.68 KG/M2

## 2024-09-23 DIAGNOSIS — F10.10 ENGAGES IN BINGE CONSUMPTION OF ALCOHOL: ICD-10-CM

## 2024-09-23 DIAGNOSIS — F33.9 EPISODE OF RECURRENT MAJOR DEPRESSIVE DISORDER, UNSPECIFIED DEPRESSION EPISODE SEVERITY (HCC): Chronic | ICD-10-CM

## 2024-09-23 NOTE — PSYCH
Visit Time    Visit Start Time: 0830  Visit Stop Time: 0930  Total Visit Duration: {Psych Total Visit Time:30777}    Subjective:     Patient ID: Cathryn Craig is a 34 y.o. female.    [facility] Progress Notes      Specialized Services Documentation  Therapist must complete separate progress note for each specific clinical activity in which the individual participated during the day.       EDUCATION THERAPY      Cathryn Craig {SL AMB PSYCH ACTIVELY:00487} shared in morning assessment and goal review. Presented as {Readiness to Learnin} related to readiness to learn. Cathryn Craig  {DID/DID NOT:96990} complete goal from last treatment day identifying gaining {HEARS:33674}. Cathryn Craig {DID/DID NOT:40137} present with any barriers to learning. Throughout morning group, Cathryn Craig participated in {morning assessmemt experiences:09389}. Cathryn Craig made *** progress toward goal. Continue education group to assess willingness to engage, assess transfer of knowledge, and participate in skill development.    Tx Plan Objective: 1.1, 1.2, 1.3, 1.4 , Therapist: Balaji Christine    Visit Time    Visit Start Time: 1330  Visit Stop Time: 1430  Total Visit Duration: {Psych Total Visit Time:88945}    Subjective:     Patient ID: Cathryn Craig is a 34 y.o. female.    [facility] Clinical Progress Notes      Specialized Services Documentation  Therapist must complete separate progress note for each specific clinical activity in which the individual participated during the day.       GROUP PSYCHOTHERAPY    Cathryn Craig participated {SL AMB PSYCH ACTIVELY:53701} in psychotherapy group.  This was observed {consistent/inconsistent pain:8324658488} throughout the treatment day. They were engaged in learning related to {Education Completed:67525}. Staff utilized {Teaching Method:47829} teaching methods.  Cathryn Craig shared area of learning and set a goal for outside of program to ***.  Cathryn  Rafa was able to share items explored during the day and shared in adding to their WRAP.  Ended session with {staff/peer led:25270} led exercise ***.  Cathryn Craig demonstrated *** progress toward goal.  Continue group psychotherapy to actively practice learned skills and encourage transfer of knowledge to unstructured time.    Tx Plan Objective: 1.1, 1.2, 1.3, 1.4 , Therapist: Balaji Christine    Group Psychotherapy    Visit Start Time: (1215)  Visit Stop Time: (1315)  Total Visit Duration: {Psych Total Visit Time:19692}      Subjective:     Patient ID: Cathryn Craig 34 y.o.      This group was facilitated virtually in a private office using HIPAA Compliant and Approved [employer] Teams.  Cathryn Craig actively engaged in psychoeducational group about challenging automatic negative thoughts negative thoughts that are perceived globally, external factors that influence an individuals thoughts and behaviors.(ANTs).The skill helps to identify negative thoughts and cognitive distortions. The outcome being that negative thoughts are challenged in the thought process and regulation of emotion. Group discovered strategies that help them to manage negative thoughts and rethink the negative thoughts when faced with a negative challenge. The group talked about understanding the purpose of challenging negative thoughts on a personal and social level and how it affects themselves and others.. Teaching on the emphasis of self monitoring and self-care, the group focus is geared how togo to for help when the negative thoughts become overly intrusive. Group was encouraged to ask questions in an open forum at the end of session. *** progress displayed through engagement in topic. Cathryn Craig will continue to engage in psychotherapy to encourage positive self realization.  Treatment Plan Objective 1.1, 1.2, 1.3, 1.4 Therapist: Balaji KULKARNI Ed.

## 2024-09-23 NOTE — PROGRESS NOTES
Subjective:     Patient ID: Cathryn Craig is a 34 y.o. female.    [facility] Progress Notes      Specialized Services Documentation  Therapist must complete separate progress note for each specific clinical activity in which the individual participated during the day.     Cathryn Craig was excused today from PHP due to pre-existing physician appointment. Excused absence # 1. Unable to complete treatment plan due to absence, will be completed next attended treatment day.     Mireya CELAYAN RN

## 2024-09-24 ENCOUNTER — TELEMEDICINE (OUTPATIENT)
Dept: PSYCHOLOGY | Facility: CLINIC | Age: 34
End: 2024-09-24
Payer: COMMERCIAL

## 2024-09-24 DIAGNOSIS — T14.91XA ATTEMPTED SUICIDE (HCC): ICD-10-CM

## 2024-09-24 DIAGNOSIS — F43.21 ADJUSTMENT DISORDER WITH DEPRESSED MOOD: Primary | ICD-10-CM

## 2024-09-24 DIAGNOSIS — F16.91 HALLUCINOGEN USE DISORDER IN REMISSION: ICD-10-CM

## 2024-09-24 DIAGNOSIS — F41.1 GAD (GENERALIZED ANXIETY DISORDER): ICD-10-CM

## 2024-09-24 PROCEDURE — H0035 MH PARTIAL HOSP TX UNDER 24H: HCPCS

## 2024-09-24 NOTE — PSYCH
Group Psychotherapy      Visit Start Time: (1045)  Visit Stop Time: (1145)  Total Visit Duration:  60 minutes    Subjective:     Patient ID: Cathryn Craig 34 y.o.      This group was facilitated virtually in a private office using HIPAA Compliant and Approved [employer] Teams.  Cathryn Craig engaged in psychoeducational group about conflict resolution. The skill helps to develop skills to create resolution with self and others with whom one may interact with. Group discovered strategies that help them to develop positive assertiveness skills on a short term and long term basis. The group talked about understanding the purpose, and meaning of conflict resolution in intellectual and emotional expression, regulation , and recognition, and how it affects themselves and others.Teaching on the emphasis of self monitoring and resolution techniques, discussions on how to create positive resolutions through effective assertive tools were discussed. Group was encouraged to ask questions in an open forum at the end of group. Adequate progress displayed through engagement in topic\, Cathryn was present for the entire session and was an active listener through the session. Cathryn Craig will continue to engage in psychotherapy to encourage positive conflict resolution.  Treatment Plan Objective 1.1, 1.2, 1.3, 1.4 Therapist: Balaji KULKARNI Ed.

## 2024-09-24 NOTE — BH TREATMENT PLAN
"    Assessment/Plan:          1. Adjustment disorder with depressed mood        2. KAREN (generalized anxiety disorder)        3. Hallucinogen use disorder in remission        4. Attempted suicide (HCC)           Subjective:     Patient ID: Cathryn Craig is a 34 y.o. female.        Subjective:        Subjective  Patient ID: Cathryn Craig is a 34 y.o. female.     [facility] Treatment Plan   AREAS OF NEED: Symptoms of adjustment disorder with depressed mood, KAREN as evidenced by lack of motivation, low self confidence, worthlessness, feeling lost, confused on how to contribute, body dysmorphia, lack of routine, feeling overwhelmed, shakiness and some situational ruminations due to relationship and financial stressors.  Date Initiated: 09/20/24     Strengths: \"Listening to others, understanding how they feel, communication and solving problems\"               LONG TERM GOAL:   Date Initiated: 09/20/24  1.0  While at [facility], I will gain new skills/techniques/education/support/insights which I can use daily to decrease my symptoms and increase my quality of life.  Target Date: 10/18/2024  Completion Date:         SHORT TERM OBJECTIVES:      Date Initiated: 09/20/24  1.1 I will learn and practice daily a new coping technique to improve my day-to-day functioning.   Revision Date:   Target Date: 10/01/2024  Completion Date:      Date Initiated: 09/20/24  1.2 I will learn three ways to communicate verbally when experiencing my emotions and share implementation of skills daily to build upon relationships.  Revision Date:   Target Date: 10/01/2024  Completion Date:     Date Initiated: 09/20/24  1.3 I will take medications as prescribed and share questions and concerns if arise.    Revision Date:  Target Date: 10/01/2024  Completion Date:      Date Initiated: 09/20/24  1.4 I will identify 3 ways my supports can assist in my recovery and agree to staff/support contact as indicated.    Revision Date:  Target Date: " 10/01/2024  Completion Date:            7 DAY REVISION:     Date Initiated:  Revision Date:   Target Date:   Completion Date:        PSYCHIATRY:  Date Initiated:  09/20/24  Medication Management and Education       Revision Date:       The person(s) responsible for carrying out the plan is Dr. Saurabh Sauceda, Tre Landon, DO; Siri Pope PA-C     NURSING/SYMPTOM EDUCATION:   Date Initiated: 09/20/24  1.1, 1.2. 1.3, 1.4 This RN/Or Therapist will provide wellness/symptoms and skill education groups three to five days weekly to educate Cathryn Craig on signs and symptoms of diagnoses, skills to manage, and medication questions that will be addressed by the treatment team.    Revision date:  The person(s) responsible for carrying out the plan is LAURA Garcia RN; Balaji KULKARNI Ed     PSYCHOLOGY:   Date Initiated: 09/20/24       1.1, 1.2, 1.4 Provide psychotherapy group 5 times per week to allow opportunity for Cathryn Craig  to explore stressors and ways of coping.   Revision Date:   The person(s) responsible for carrying out the plan is Farideh Lopez MSW,LSW; Cherry Hendrickson MA LMT     ALLIED THERAPY:   Date Initiated: 09/20/24  1.1,1.2 Engage Cathryn Craig in AT group 5 times weekly to encourage development and use of wellness tools to decrease symptoms and promote recovery through meaningful activity.  Revision Date:       The person(s) responsible for carrying out the plan is FREEMAN CarlisleBC; Adam MILLARDBC     CASE MANAGEMENT:   Date Initiated: 09/20/24      1.0 This  will meet with Cathryn Broussardcarla  3-4 times weekly to assess treatment progress, discharge planning, connection to community supports and UR as indicated.  Revision Date:   The person(s) responsible for carrying out the plan is Mireya SANCHEZ RN     TREATMENT REVIEW/COMMENTS:      DISCHARGE CRITERIA: Identify 3 signs of progress and complete relapse prevention plan.    DISCHARGE PLAN: Connect  with identified outpatient providers.   Estimated Length of Stay: 10 treatment days              Diagnosis and Treatment Plan explained to Cathryn, Cathryn relates understanding diagnosis and is agreeable to Treatment Plan.            CLIENT COMMENTS / Please share your thoughts, feelings, need and/or experiences regarding your treatment plan with Staff.  Please see follow up note with comments.        Signatures can be found on [facility] Treatment plan consent form.

## 2024-09-24 NOTE — PSYCH
"Visit Time    Visit Start Time: 1215  Visit Stop Time: 1315  Total Visit Duration: 60 minutes     Subjective:     Patient ID: Cathryn Craig is a 34 y.o. female.    [facility] Clinical Progress Notes      Specialized Services Documentation  Therapist must complete separate progress note for each specific clinical activity in which the individual participated during the day.       Group Psychotherapy  This group was facilitated virtually in a private office using HIPAA Compliant and Approved Microsoft Teams.  Cathryn Craig consented to the use of tele-video modality of treatment.  ([id number]) Cathryn Craig actively participated in psychoeducation group this afternoon which focused on sleep hygiene.  Group then identified sleep hygiene habits that are currently effective and habits they wish to incorporate into their night time routine to promote sleep hygiene.  This writer explained the importance of quality sleep in relation to wellness.  Sleep diary and additional tips on sleep hygiene were discussed.  Then, a video titled \"How to Improve Your Sleep\" was viewed. Some progress toward goal observed.  Continue psychoeducation group to increase awareness of good sleeping habits to promote wellness.      Tx Plan Objectives: 1.1, 1.2      Mireya SANCHEZ RN      Visit Time    Visit Start Time: 1030  Visit Stop Time: 1100  Total Visit Duration:  30 minutes    Subjective:    Patient ID: Cathryn Craig is a 34 y.o. y.o. female.    [facility] Clinical Progress Notes      Specialized Services Documentation  Therapist must complete separate progress note for each specific clinical activity in which the individual participated during the day.     INDIVIDUAL PSYCHOTHERAPY     Cathryn Craig actively shared in individual therapy.   Cathryn Craig identified she had a fairly good weekend, went to dinner with her boyfriend and spent time with baby. Cathryn reports her partner is being more supportive and " understanding of her mental health as she is starting to have open lines of communication regarding her anxiety, depression and negative childhood experience. Cathryn says she is finding being in PHP meaningful and connected particularly with Mackenzie from [facility]'s story, helping her understand how mental health affects our lives and career. Cathryn is interested in [facility] and support groups. Discussed nicotine use with Cathryn and reports she has smoking cessation information and plans on completing the education. Cathryn would like to continue to work on finding effective coping skills, enjoyed group on PLEASE. Next session follow up to include review of coping skills, medication effectiveness, routine, and communication. Continue individual psychotherapy in order to minimize depression and anxiety.     Case management needs addressed: Cathryn joined groups today at 0910 due to over sleeping after this writer called and spoke with her. Reminded of attendance policy. Reviewed treatment plan.   I,Cathryn Broussardjeffreylisette,am physically unable to provide a signature; however, I understand the nature of and am in agreement with the documentation presented to me via TEAMS.  I have received a copy through My Chart and/or the [employer] Postal [employer].  I freely give verbal consent.  Name of Document (s) treatment plan:  Witness to verbal consent: Mireya Nguyen, RN  Witness to verbal consent: Mariangel Gill     Treatment Plan Objectives addressed: 1.1,1.2, 1.3, 1.4    Current suicide risk : Low     Medications changes/added/denied? No    Treatment session number: 2    Individual Case Management Visit provided today? Yes     [facility] follow up physician's orders: No new orders.        Mireya Nguyen

## 2024-09-24 NOTE — PSYCH
Visit Time    Visit Start Time: 0830  Visit Stop Time: 0930  Total Visit Duration:  30 minutes    Subjective:     Patient ID: Cathryn Craig is a 34 y.o. female.    [facility] Clinical Progress Notes      Specialized Services Documentation  Therapist must complete separate progress note for each specific clinical activity in which the individual participated during the day.       EDUCATION THERAPY      Cathryn Craig actively shared in morning assessment and goal review. Presented as Receptive related to readiness to learn. Cathryn Craig  did complete goal from last treatment day identifying gaining responsibility. Cathryn Craig did not present with any barriers to learning. Throughout morning group, Cathryn Craig participated in mindfulness exercise and movement experience. Cathryn Craig made good progress toward goal. Continue education group to assess willingness to engage, assess transfer of knowledge, and participate in skill development.    Tx Plan Objective: 1.1, Therapist: Ashley Costenbader, MA LMT     Visit Time    Visit Start Time: 1330  Visit Stop Time: 1430  Total Visit Duration:  60 minutes    Subjective:     Patient ID: Cathryn Craig is a 34 y.o. female.    [facility] Clinical Progress Notes      Specialized Services Documentation  Therapist must complete separate progress note for each specific clinical activity in which the individual participated during the day.       GROUP PSYCHOTHERAPY    Cathryn Craig participated actively in psychotherapy group.  This was observed Consistent throughout the treatment day. They were engaged in learning related to Wellness Tools. Staff utilized Verbal, Written, A/V, and Demonstration teaching methods.  Cathryn Craig shared area of learning and set a goal for outside of program to make an appointment, complete laundry, and shower.  Cathryn Craig was able to share items explored during the day and shared in adding to their WRAP.  Ended  session with staff led exercise guided mindfulness.  Cathryn Rafa demonstrated good progress toward goal.  Continue group psychotherapy to actively practice learned skills and encourage transfer of knowledge to unstructured time.    Tx Plan Objective: 1.1,1.2, Therapist: Ashley Costenbader, MA LMT

## 2024-09-24 NOTE — PSYCH
Virtual Regular Visit    Verification of patient location:    Patient is located at Home in the following state in which I hold an active license [place]      Assessment/Plan:    Problem List Items Addressed This Visit       Adjustment disorder with depressed mood - Primary    KAREN (generalized anxiety disorder)    Hallucinogen use disorder in remission    Attempted suicide (HCC)       Goals addressed in session: PHP VIRTUAL GROUP DUE TO virtual preference of care           Reason for visit is No chief complaint on file.       Encounter provider  PARTIAL PSYCH PROGRAM      Recent Visits  No visits were found meeting these conditions.  Showing recent visits within past 7 days and meeting all other requirements  Future Appointments  No visits were found meeting these conditions.  Showing future appointments within next 150 days and meeting all other requirements       The patient was identified by name and date of birth. Cathryn Craig was informed that this is a telemedicine visit and that the visit is being conducted throughthe Microsoft Teams platform. She agrees to proceed..  My office door was closed. No one else was in the room.  She acknowledged consent and understanding of privacy and security of the video platform. The patient has agreed to participate and understands they can discontinue the visit at any time.    Patient is aware this is a billable service.     Subjective  Cathryn Craig is a 34 y.o. female I spent FIVE GROUP HOURS PLUS CASE MANAGEMENT minutes with patient today in which greater than 50% of time was spent in counseling/coordination of care regarding PHP - SEE NOTES  .      HPI     Past Medical History:   Diagnosis Date    Abnormal Pap smear of cervix     last pap 1/2024    Depression     no meds    Varicella     Vitiligo        Past Surgical History:   Procedure Laterality Date    REMOVAL OF INTRAUTERINE DEVICE (IUD)      5 year ago       Current Outpatient Medications   Medication Sig  Dispense Refill    amoxicillin (AMOXIL) 500 mg capsule  (Patient not taking: Reported on 2024)      fish oil-omega-3 fatty acids 1000 MG capsule Take 2 g by mouth daily      MAGNESIUM PO Take by mouth      Prenatal Multivit-Min-Fe-FA (PRE- PO) Take by mouth      sertraline (ZOLOFT) 25 mg tablet Take 1 tablet (25 mg total) by mouth daily 30 tablet 0     No current facility-administered medications for this visit.        No Known Allergies    Review of Systems    Video Exam    There were no vitals filed for this visit.    Physical Exam     Visit Time    Visit Start Time: 830  Visit Stop Time: 1430  Total Visit Duration:  300 minutes

## 2024-09-24 NOTE — PSYCH
"Visit Time    Visit Start Time: 0930  Visit Stop Time: 1030  Total Visit Duration:  60 minutes    Subjective:     Patient ID: Cathryn Craig is a 34 y.o. female.    [facility] Progress Notes      Specialized Services Documentation  Therapist must complete separate progress note for each specific clinical activity in which the individual participated during the day.     Allied Therapy 0930-1030 This group was facilitated virtually in a private office using HIPAA Compliant and Approved [employer] Teams. Cathryn Craig actively participated in music therapy group regarding supports. The group participated in a discussion around supports in their lives and areas where they need support. The group participated in a cj discussion on the song \"Bridge Over [place]ou[place]\". The group read and discussed a handout on supports. The group listened to \"You've Got a Friend\" and identified the types of support demonstrated within the song. Cathryn identified setting boundaries as an area of supports that needs attention in their life. Some effort noted towards treatment goal. Continue AT to encourage the development and proactive use of supports.  Tx Plan Objective: 1.1,1.2,1.4, Therapist:  Adam Patel, JEREMIAH, MT-BC  "

## 2024-09-25 ENCOUNTER — TELEMEDICINE (OUTPATIENT)
Dept: PSYCHOLOGY | Facility: CLINIC | Age: 34
End: 2024-09-25
Payer: COMMERCIAL

## 2024-09-25 DIAGNOSIS — F16.91 HALLUCINOGEN USE DISORDER IN REMISSION: ICD-10-CM

## 2024-09-25 DIAGNOSIS — F43.21 ADJUSTMENT DISORDER WITH DEPRESSED MOOD: Primary | ICD-10-CM

## 2024-09-25 DIAGNOSIS — F41.1 GAD (GENERALIZED ANXIETY DISORDER): ICD-10-CM

## 2024-09-25 DIAGNOSIS — T14.91XA ATTEMPTED SUICIDE (HCC): ICD-10-CM

## 2024-09-25 PROCEDURE — H0035 MH PARTIAL HOSP TX UNDER 24H: HCPCS

## 2024-09-25 NOTE — PSYCH
Subjective:     Patient ID: Cathryn Craig is a 34 y.o. female.    [facility] Clinical Progress Notes      Specialized Services Documentation  Therapist must complete separate progress note for each specific clinical activity in which the individual participated during the day.     Case Management Note    Mireya SANCHEZ RN    Current suicide risk : Low     A case management session is not scheduled today with Cathryn Craig ; additionally, they did not request a CM meeting.  Cathryn Craig is aware of next scheduled 1:1. Cathryn notified this writer she will be at med check tomorrow however will need to leave group early for interview and dentist appointment.     Medications changes/added/denied? No    Treatment session number: 3    Individual Case Management Visit provided today? No    [facility] follow up physician's orders: None at this time

## 2024-09-25 NOTE — PSYCH
Virtual Regular Visit    Verification of patient location:    Patient is located at Home in the following state in which I hold an active license [place]      Assessment/Plan:    Problem List Items Addressed This Visit       Adjustment disorder with depressed mood - Primary    KAREN (generalized anxiety disorder)    Hallucinogen use disorder in remission    Attempted suicide (HCC)       Goals addressed in session:           Reason for visit is PHP VIRTUAL GROUP DUE TO virtual preference of care         Encounter provider  PARTIAL PSYCH PROGRAM      Recent Visits  No visits were found meeting these conditions.  Showing recent visits within past 7 days and meeting all other requirements  Future Appointments  No visits were found meeting these conditions.  Showing future appointments within next 150 days and meeting all other requirements       The patient was identified by name and date of birth. Cathryn Craig was informed that this is a telemedicine visit and that the visit is being conducted throughthe Microsoft Teams platform. She agrees to proceed..  My office door was closed. No one else was in the room.  She acknowledged consent and understanding of privacy and security of the video platform. The patient has agreed to participate and understands they can discontinue the visit at any time.    Patient is aware this is a billable service.     Subjective  Cathryn Craig is a 34 y.o. female  .      HPI     Past Medical History:   Diagnosis Date    Abnormal Pap smear of cervix     last pap 1/2024    Depression     no meds    Varicella     Vitiligo        Past Surgical History:   Procedure Laterality Date    REMOVAL OF INTRAUTERINE DEVICE (IUD)      5 year ago       Current Outpatient Medications   Medication Sig Dispense Refill    amoxicillin (AMOXIL) 500 mg capsule  (Patient not taking: Reported on 9/23/2024)      fish oil-omega-3 fatty acids 1000 MG capsule Take 2 g by mouth daily      MAGNESIUM PO Take by mouth       Prenatal Multivit-Min-Fe-FA (PRE- PO) Take by mouth      sertraline (ZOLOFT) 25 mg tablet Take 1 tablet (25 mg total) by mouth daily 30 tablet 0     No current facility-administered medications for this visit.        No Known Allergies    Review of Systems    Video Exam    There were no vitals filed for this visit.    Physical Exam     Visit Time    I spent FOUR GROUP HOURS PLUS CASE MANAGEMENT minutes with patient today in which greater than 50% of time was spent in counseling/coordination of care regarding PHP - SEE NOTES

## 2024-09-25 NOTE — PSYCH
"Visit Time    Visit Start Time: 0930  Visit Stop Time: 1030  Total Visit Duration:  60 minutes    Subjective:     Patient ID: Cathryn Craig is a 34 y.o. female.    [facility] Clinical Progress Notes      Specialized Services Documentation  Therapist must complete separate progress note for each specific clinical activity in which the individual participated during the day.     Allied Therapy 0930-1030 This group was facilitated virtually in a private office using HIPAA Compliant and Approved [employer] Teams. Cathryn Craig participated in music therapy group regarding writing letters to one's past self. The group discussed past events/points of concern in their lives they felt the need to address. The group listened to and discussed the song \"Letter to Me\". The group then discussed and worked on letter to self worksheet. The group then listened to and discussed the song \"Starting Over\", and discussed an area they hoped to grow following writing their letter to self. Cathryn did not share but appeared attentive throughout. Some effort noted towards treatment goal. Continue AT to encourage healthy self reflection. Tx Plan Objective: 1.1,1.2,1.4, Therapist:  Adam Patel, [facility], [place]  "

## 2024-09-25 NOTE — PSYCH
"Visit Time    Visit Start Time: 1045  Visit Stop Time: 1145  Total Visit Duration: 60 minutes    Subjective:     Patient ID: Cathryn Craig is a 34 y.o. female.    [facility] Clinical Progress Notes      Specialized Services Documentation  Therapist must complete separate progress note for each specific clinical activity in which the individual participated during the day.       Group Psychotherapy Life Skills ([id number]) Cathryn Craig minimally engaged in an open processing group which was facilitated virtually in a private office using HIPAA Compliant and Approved [employer] Teams. Cathryn consented to the use of tele-video modality of treatment and was virtually present for group psychotherapy today. The group members shared their stories with one another, discussed how they \"mask their symptoms\", and how to move forward. Cathryn did participate in the conversations related to the topics. Cathryn made little progress towards goals through verbal participation in group; to accomplish long term goals continue to utilize skills learned in programming. Continue with psychotherapy to educate and encourage use of wellness tools. Tx Plan Objective: 1.1,1.2 Therapist: JASON Hall, LSW      "

## 2024-09-25 NOTE — PSYCH
Visit Time    Visit Start Time: 0830  Visit Stop Time: 0930  Total Visit Duration:  60 minutes    Subjective:     Patient ID: Cathryn Craig is a 34 y.o. female.    [facility] Clinical Progress Notes      Specialized Services Documentation  Therapist must complete separate progress note for each specific clinical activity in which the individual participated during the day.       EDUCATION THERAPY      Cathryn Craig actively shared in morning assessment and goal review. Presented as Receptive related to readiness to learn. Cathryn Craig  did complete goal from last treatment day identifying she set up an appointment for a job interview, gaining responsibility. Cathryn Craig did not present with any barriers to learning. Throughout morning group, Cathryn Craig participated in mindfulness exercise and movement experience. Cathryn Craig made positive progress toward goal. Continue education group to assess willingness to engage, assess transfer of knowledge, and participate in skill development.    Tx Plan Objective: 1.1, 1.2, 1.3, 1.4 , Therapist: Balaji Christine    Visit Time    Visit Start Time: 1330  Visit Stop Time: 1430  Total Visit Duration:  60 minutes    Subjective:     Patient ID: Cathryn Craig is a 34 y.o. female.    [facility] Clinical Progress Notes      Specialized Services Documentation  Therapist must complete separate progress note for each specific clinical activity in which the individual participated during the day.       GROUP PSYCHOTHERAPY    Cathryn Craig participated actively in psychotherapy group.  This was observed Consistent throughout the treatment day. They were engaged in learning related to Wellness Tools. Staff utilized Verbal and Demonstration teaching methods.  Cathryn Craig shared area of learning and set a goal for outside of program to prepare for her job interview.  Cathryn Craig was able to share items explored during the day and shared in adding to  their WRAP.  Ended session with staff led exercise gratitude practice.  Cathryn Craig demonstrated positive progress toward goal.  Continue group psychotherapy to actively practice learned skills and encourage transfer of knowledge to unstructured time.    Tx Plan Objective: 1.1, 1.2, 1.3, 1.4 , Therapist: Balaji Christine    Group Psychotherapy        Visit Start Time: (1215)  Visit Stop Time: (1315)  Total Visit Duration:  60 minutes  Subjective:     Patient ID: Cathryn Craig 34 y.o.    This group was facilitated virtually in a private office using HIPAA compliant and approved [employer] teams  Cathryn Craig actively engaged in psychoeducational group about unconventional coping strategies. The skills introduced help to maintain and regulate emotions and create healthy non-conventional coping skills. Group discovered strategies that help them to manage emotional stress and create unconventional coping strategies that will help emotional regulation on a short term basis.The group talked about understanding the purpose, and meaning of emotional regulation and the importance of emotional expression, regulation , and recognition, and how it affects themselves and others. Teaching on the skill process of unconventional coping strategies and DBT self-care, members of the group are able to manage short term situations with varied direct tangible coping stratiges. Group was encouraged to ask questions in an open forum at the end of group. Positive progress displayed through engagement in topic, Cathryn shared her thoughts about the difference of complaining versus venting during group and found purpose in constructive venting.  Cathryn Craig will continue to engage in psychotherapy to encourage positive self realization.  Treatment Plan Objective 1.1, 1.2, 1.3, 1.4 Therapist: Balaji KULKARNI Ed.

## 2024-09-26 ENCOUNTER — TELEMEDICINE (OUTPATIENT)
Dept: PSYCHIATRY | Facility: CLINIC | Age: 34
End: 2024-09-26
Payer: COMMERCIAL

## 2024-09-26 ENCOUNTER — DOCUMENTATION (OUTPATIENT)
Dept: PSYCHOLOGY | Facility: CLINIC | Age: 34
End: 2024-09-26

## 2024-09-26 ENCOUNTER — APPOINTMENT (OUTPATIENT)
Dept: PSYCHOLOGY | Facility: CLINIC | Age: 34
End: 2024-09-26
Payer: COMMERCIAL

## 2024-09-26 DIAGNOSIS — F43.21 ADJUSTMENT DISORDER WITH DEPRESSED MOOD: Primary | ICD-10-CM

## 2024-09-26 DIAGNOSIS — T14.91XA ATTEMPTED SUICIDE (HCC): ICD-10-CM

## 2024-09-26 DIAGNOSIS — F41.1 GAD (GENERALIZED ANXIETY DISORDER): ICD-10-CM

## 2024-09-26 PROCEDURE — 99213 OFFICE O/P EST LOW 20 MIN: CPT | Performed by: PHYSICIAN ASSISTANT

## 2024-09-26 NOTE — PROGRESS NOTES
Subjective:     Patient ID: Cathryn Craig is a 34 y.o. female.    [facility] Clinical Progress Notes      Specialized Services Documentation  Therapist must complete separate progress note for each specific clinical activity in which the individual participated during the day.       Cathryn Craig is excused from PHP today due to job interview and dental appointment. This is absence number 2.     Mireya CELAYAN RN

## 2024-09-26 NOTE — PSYCH
"Subjective:   Patient ID: Cathryn Craig is a 34 y.o. female.    [facility] Clinical Progress Notes      Specialized Services Documentation  Therapist must complete separate progress note for each specific clinical activity in which the individual participated during the day.     Allied Therapy   Visit Start Time: 0930  Visit Stop Time: 1030  Total Visit Duration: *** minutes  Group was facilitated virtually in a private office using HIPAA compliant and approved [employer] Teams. Cathryn Craig  consented to the use of tele-video modality of treatment and was virtually present for group allied therapy.  Cathryn Craig *** actively shared in MTH group focused on perfectionism. Therapist started the group with a discussion of the song \"Surface Pressure\". Group was asked the question, \"in what ways do we let the pressure build up in our lives?\" *** was observed to be engaged in a cj analysis of “Perfect” by Radha. Therapist discussed what perfectionism is, how it can affect us, and how it can motivate us. Therapist then led group in active music making and gave instructions to find a \"found sound\" to make music on while giving each group member a chance to solo. Group listened to “Let it Be” and focused on the lyrics. *** shared *** as a way to break the cycle of rumination. *** effort noted toward treatment goal. Continue AT to encourage development and understanding of perfectionism.   Tx Plan Objective: 1.1,1.2,1.4, Therapist:  Kiesha Greene, MT-BC  "

## 2024-09-26 NOTE — PSYCH
"  Virtual Regular Visit    Verification of patient location:    Patient is located at Home in the following state in which I hold an active license [place]           Reason for visit is   Chief Complaint   Patient presents with    Virtual Regular Visit          Encounter provider Siri Pope PA-C      Recent Visits  Date Type Provider Dept   09/20/24 [facility]   Showing recent visits within past 7 days and meeting all other requirements  Today's Visits  Date Type Provider Dept   09/26/24 Telemedicine [facility]   Showing today's visits and meeting all other requirements  Future Appointments  No visits were found meeting these conditions.  Showing future appointments within next 150 days and meeting all other requirements       The patient was identified by name and date of birth. Cathryn Craig was informed that this is a telemedicine visit and that the visit is being conducted throughthe Epic Embedded platform. She agrees to proceed..  My office door was closed. No one else was in the room.  She acknowledged consent and understanding of privacy and security of the video platform. The patient has agreed to participate and understands they can discontinue the visit at any time.    Patient is aware this is a billable service.              Progress Note - Behavioral Health   [facility]  Cathryn Craig 34 y.o. female MRN: [id number]   This note was not shared with the patient due to this is a psychotherapy note    Progress at partial program: some improvement.      Chart reviewed.  Cathryn seen by Dr Sauceda 9/20 at which time zoloft continued with plans to titrate this week.  Cathryn states she had some initial abdominal discomfort but this resolved.  Denies abdominal pain, nausea, diarrhea.  Feels program is helping. Wants to work further on coping tools kaya to \"de-escalate stress\".  Feels she is communicating " better with her partner but this could be improved.  Is gaining ground managing a structure and routine.  Feels safe at home.  Denies SI or HI towards infant. Reports missing zoloft one day this week at which time she felt more pessimistic. Is sleeping longer as baby has been sleeping.  Appetite is good.  Rates depressed mood as 1-2/10 and anxiety as 2-3/10.  Financial stress is identified.  Says she has a job interview today.     ROS:   As above otherwise negative    Active Ambulatory Problems     Diagnosis Date Noted    Alcohol consumption during pregnancy, antepartum 2024    Drug use affecting pregnancy, antepartum 2024    40 weeks gestation of pregnancy 2024    At increased risk for intimate partner violence 2024    Encounter for induction of labor 2024    Dysuria 2024     (spontaneous vaginal delivery) 2024    Oligohydramnios 2024    MDD (major depressive disorder) 2024    Acetaminophen overdose, intentional self-harm, initial encounter ([facility]) 2024    ADHD (attention deficit hyperactivity disorder) 2024    Unspecified mood (affective) disorder ([facility]) 2024    Adjustment disorder with depressed mood 2024    KAREN (generalized anxiety disorder) 2024    Hallucinogen use disorder in remission 2024    Attempted suicide ([facility]) 2024     Resolved Ambulatory Problems     Diagnosis Date Noted    No Resolved Ambulatory Problems     Past Medical History:   Diagnosis Date    Abnormal Pap smear of cervix     Depression     Varicella     Vitiligo      Family History   Problem Relation Age of Onset    No Known Problems Mother     No Known Problems Father     No Known Problems Sister     No Known Problems Brother     Alcohol abuse Maternal Grandfather     Depression Maternal Grandmother     Cancer Paternal Grandfather         brain    Cancer Paternal Grandmother         pancreatic     Social History     Socioeconomic History    Marital  status: Single     Spouse name: Not on file    Number of children: Not on file    Years of education: Not on file    Highest education level: Not on file   Occupational History    Not on file   Tobacco Use    Smoking status: Some Days     Types: Cigarettes    Smokeless tobacco: Never    Tobacco comments:     Was vaping 5x/day prior to pregnancy (nicotine) - still vaping   Vaping Use    Vaping status: Every Day    Substances: Nicotine, Flavoring   Substance and Sexual Activity    Alcohol use: Yes     Alcohol/week: 10.0 standard drinks of alcohol     Types: 10 Shots of liquor per week    Drug use: Not Currently     Comment: ketamine, cocaine - not since 9/2023    Sexual activity: Yes     Partners: Male   Other Topics Concern    Not on file   Social History Narrative    Not on file     Social Determinants of Health     Financial Resource Strain: Not on File (3/21/2022)    Received from [employer] [facility]    Financial Resource Strain     Financial Resource Strain: 0   Food Insecurity: Food Insecurity Present (8/9/2024)    Hunger Vital Sign     Worried About Running Out of Food in the Last Year: Sometimes true     Ran Out of Food in the Last Year: Never true   Transportation Needs: Unmet Transportation Needs (8/9/2024)    PRAPARE - Transportation     Lack of Transportation (Medical): No     Lack of Transportation (Non-Medical): Yes   Physical Activity: Not on File (3/21/2022)    Received from [facility]    Physical Activity     Physical Activity: 0   Stress: Not on File (3/21/2022)    Received from [employer] [facility]    Stress     Stress: 0   Social Connections: Not on File (3/21/2022)    Received from [employer] [facility]    Social Connections     Social Connections and Isolation: 0   Intimate Partner Violence: Not on file   Housing Stability: High Risk (8/9/2024)    Housing Stability Vital Sign     Unable to Pay for Housing in the Last Year: Yes     Number of Times Moved in the Last Year: 8     Homeless in the Last Year: Yes     No Known Allergies       Mental  Status Evaluation:    Appearance:  age appropriate   Behavior:  pleasant, cooperative   Speech:  normal rate and volume   Mood:  improved   Affect:  normal range and intensity   Thought Process:  goal directed   Associations: intact associations   Thought Content:  negative thoughts   Perceptual Disturbances: none   Risk Potential: Suicidal ideation - None  Homicidal ideation - None   Sensorium:  oriented to person, place, and time/date   Memory:  recent and remote memory grossly intact   Consciousness:  alert and awake   Attention: attention span and concentration are age appropriate   Insight:  intact   Judgment: intact   Gait/Station: unable to assess   Motor Activity: unable to assess today due to virtual visit       Laboratory results: I have personally reviewed all pertinent laboratory/tests results.      Assessment   Diagnoses and all orders for this visit:    Adjustment disorder with depressed mood  -     sertraline (Zoloft) 50 mg tablet; Take 1 tablet (50 mg total) by mouth daily    KAREN (generalized anxiety disorder)  -     sertraline (Zoloft) 50 mg tablet; Take 1 tablet (50 mg total) by mouth daily    Attempted suicide (HCC)       Current Outpatient Medications   Medication Sig Dispense Refill    sertraline (Zoloft) 50 mg tablet Take 1 tablet (50 mg total) by mouth daily 30 tablet 1    amoxicillin (AMOXIL) 500 mg capsule  (Patient not taking: Reported on 2024)      fish oil-omega-3 fatty acids 1000 MG capsule Take 2 g by mouth daily      MAGNESIUM PO Take by mouth      Prenatal Multivit-Min-Fe-FA (PRE- PO) Take by mouth       No current facility-administered medications for this visit.         Plan    Planned medication and treatment changes:  Cont zoloft titration to 50 mg/d.      All current active medications have been reviewed.  Continue treatment with group therapy and partial program      Risks / Benefits of Treatment:    Risks, benefits, and possible side effects of medications explained  to patient and patient verbalizes understanding and agrees to medications.     Counseling / Coordination of Care:    Patient's progress discussed with staff in treatment team meeting.  Medications, treatment progress and treatment plan reviewed with patient.    Siri Pope PA-C 09/26/24

## 2024-09-26 NOTE — PSYCH
Visit Time    Visit Start Time: 830  Visit Stop Time: 930  Total Visit Duration: {Psych Total Visit Time:31158}    Subjective:     Patient ID: Cathryn Craig is a 34 y.o. female.    [facility] Clinical Progress Notes      Specialized Services Documentation  Therapist must complete separate progress note for each specific clinical activity in which the individual participated during the day.       EDUCATION THERAPY      Cathryn Craig {SL AMB PSYCH ACTIVELY:71508} shared in morning assessment and goal review. Presented as {Readiness to Learnin} related to readiness to learn. Cathryn Craig  {DID/DID NOT:63352} complete goal from last treatment day identifying gaining {HEARS:82668}. Cathryn Broussardjeffreylisette {DID/DID NOT:01717} present with any barriers to learning. Throughout morning group, Cathryn Craig participated in {morning assessmemt experiences:59704}. Cathryn Craig made *** progress toward goal. Continue education group to assess willingness to engage, assess transfer of knowledge, and participate in skill development.    Tx Plan Objective: 1.1, 1.2, 1.3, 1.4 , Therapist: Balaji Christine

## 2024-09-26 NOTE — PSYCH
Visit Time    Visit Start Time: 1045  Visit Stop Time: 1145  Total Visit Duration:  60 minutes    Subjective:     Patient ID: Cathryn Craig is a 34 y.o. female.    [facility] Clinical Progress Notes      Specialized Services Documentation  Therapist must complete separate progress note for each specific clinical activity in which the individual participated during the day.     Allied Therapy [id number] This group was facilitated virtually in a private office using HIPAA Compliant and Approved [employer] Teams. Cathryn Craig actively participated in music therapy group regarding mindfulness. The group participated in a name that tune exercise as an example of mindfulness. The group then read and discussed materials on mindfulness as well as areas they could use mindfulness in their current lives. The group then read and discussed a handout on a music mindfulness exercise. When prompted, Cathryn reported music mindfulness as a mindfulness tool they could use outside of program. Some effort noted towards treatment goal. Continue AT to encourage the development and proactive use of mindfulness coping tools. Tx Plan Objective: 1.1,1.2,1.4, Therapist:  JEREMIAH Pineda, RUTH

## 2024-09-27 ENCOUNTER — TELEMEDICINE (OUTPATIENT)
Dept: PSYCHOLOGY | Facility: CLINIC | Age: 34
End: 2024-09-27
Payer: COMMERCIAL

## 2024-09-27 DIAGNOSIS — F41.1 GAD (GENERALIZED ANXIETY DISORDER): ICD-10-CM

## 2024-09-27 DIAGNOSIS — T14.91XA ATTEMPTED SUICIDE (HCC): ICD-10-CM

## 2024-09-27 DIAGNOSIS — F16.91 HALLUCINOGEN USE DISORDER IN REMISSION: ICD-10-CM

## 2024-09-27 DIAGNOSIS — F43.21 ADJUSTMENT DISORDER WITH DEPRESSED MOOD: Primary | ICD-10-CM

## 2024-09-27 PROCEDURE — H0035 MH PARTIAL HOSP TX UNDER 24H: HCPCS

## 2024-09-27 NOTE — PSYCH
Visit Time    Visit Start Time: 0930  Visit Stop Time: 1030  Total Visit Duration: 60 minutes    Subjective:     Patient ID: Cathryn Craig is a 34 y.o. female.    [facility] Clinical Progress Notes      Specialized Services Documentation  Therapist must complete separate progress note for each specific clinical activity in which the individual participated during the day.          Group Psychotherapy - Anxiety  This group was facilitated virtually in a private office using HIPAA Compliant and Approved [employer] Teams. Cathryn Craig consented to the use of tele-video modality of treatment.  Cathryn Craig was present in psychoeducational group about anxiety. The group followed a slide show presentation on Anxiety and the Brain. Group was educated on:  Signs and symptoms of anxiety   Causes and treatments of anxiety  How the brain processes anxiety  Group members then discussed anxiety causing situations and positive ways to cope with these stressors.   Coping skills discussed were breathing paired with sitting/noticing with our thoughts and emotions exercise, challenging irrational thoughts, progressive muscle relaxation, Dropping anchor, deep breathing, and imagery.    Good progress towards goal noted through participation in group. Continue psychoeducational groups on depression to teach the value of learning about diagnosis and treatments available.     Tx Plan Objective:  1.1, 1.2, 1.3, 1.4 Therapist:  Mireya SANCHEZ RN       Visit Time    Visit Start Time: 1215  Visit Stop Time: 1230  Total Visit Duration:  15 minutes    Subjective:    Patient ID: Cathryn Craig is a 34 y.o. y.o. female.    [facility] Clinical Progress Notes      Specialized Services Documentation  Therapist must complete separate progress note for each specific clinical activity in which the individual participated during the day.     INDIVIDUAL PSYCHOTHERAPY     Cathryn Craig actively shared in individual therapy.    Cathryn Craig identified her mood has been better since starting program and feels she has more structure. 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 Shared her communication with her boyfriend is good currently but feels this could be due to his mother staying with them. Worked through communication skills she may need in the future. This writer utilized DBT interventions.  Working on WRAP assigned as homework . Some progress toward goal identified. Met with Cathryn Craig to discuss weekend plans and supports.  Cathryn Craig identified she is hopefully going to get more sleep due to baby being up frequently and go to [employer] on [employer]. Reports she hasn't had an alcoholic beverage since prior to overdose.  Goals for next week include work on relationship, communication and WRAP. Continue individual psychotherapy in order to manage anxiety and depression.     Case management needs addressed: time off and attendance policy, med check schedule for next week, calling to obtain outpatient follow up for med management and psychotherapy. Provider list emailed to Cathryn.     Treatment Plan Objectives addressed: 1.1, 1.2,1.3,1.4    Current suicide risk : Low     Medications changes/added/denied? No    Treatment session number: 4    Individual Case Management Visit provided today? Yes     [facility] follow up physician's orders: no new orders        Mierya Nguyen

## 2024-09-27 NOTE — PSYCH
Virtual Regular Visit    Verification of patient location:    Patient is located at Home in the following state in which I hold an active license [place]      Assessment/Plan:    Problem List Items Addressed This Visit       Adjustment disorder with depressed mood - Primary    KAREN (generalized anxiety disorder)    Hallucinogen use disorder in remission    Attempted suicide (HCC)       Goals addressed in session: PHP VIRTUAL GROUP DUE TO virtual preference of care           Reason for visit is No chief complaint on file.       Encounter provider  PARTIAL PSYCH PROGRAM      Recent Visits  No visits were found meeting these conditions.  Showing recent visits within past 7 days and meeting all other requirements  Future Appointments  No visits were found meeting these conditions.  Showing future appointments within next 150 days and meeting all other requirements       The patient was identified by name and date of birth. Cathryn Craig was informed that this is a telemedicine visit and that the visit is being conducted throughthe Microsoft Teams platform. She agrees to proceed..  My office door was closed. No one else was in the room.  She acknowledged consent and understanding of privacy and security of the video platform. The patient has agreed to participate and understands they can discontinue the visit at any time.    Patient is aware this is a billable service.     Subjective  Cathryn Craig is a 34 y.o. female I spent FIVE GROUP HOURS PLUS CASE MANAGEMENT minutes with patient today in which greater than 50% of time was spent in counseling/coordination of care regarding PHP - SEE NOTES  .      HPI     Past Medical History:   Diagnosis Date    Abnormal Pap smear of cervix     last pap 1/2024    Depression     no meds    Varicella     Vitiligo        Past Surgical History:   Procedure Laterality Date    REMOVAL OF INTRAUTERINE DEVICE (IUD)      5 year ago       Current Outpatient Medications   Medication Sig  Dispense Refill    amoxicillin (AMOXIL) 500 mg capsule  (Patient not taking: Reported on 2024)      fish oil-omega-3 fatty acids 1000 MG capsule Take 2 g by mouth daily      MAGNESIUM PO Take by mouth      Prenatal Multivit-Min-Fe-FA (PRE- PO) Take by mouth      sertraline (Zoloft) 50 mg tablet Take 1 tablet (50 mg total) by mouth daily 30 tablet 1     No current facility-administered medications for this visit.        No Known Allergies    Review of Systems    Video Exam    There were no vitals filed for this visit.    Physical Exam     Visit Time    Visit Start Time: 830  Visit Stop Time: 1430  Total Visit Duration:  300 minutes

## 2024-09-27 NOTE — PSYCH
Visit Time    Visit Start Time: 0830  Visit Stop Time: 0930  Total Visit Duration:  60 minutes    Subjective:     Patient ID: Cathryn Craig is a 34 y.o. female.    [facility] Clinical Progress Notes      Specialized Services Documentation  Therapist must complete separate progress note for each specific clinical activity in which the individual participated during the day.       EDUCATION THERAPY      Cathryn Craig actively shared in morning assessment and goal review. Presented as Receptive related to readiness to learn. Cathryn Craig  did complete goal from last treatment day identifying she went to a job interview,  gaining hope and responsibility. Cathryn Craig did not present with any barriers to learning. Throughout morning group, Cathryn Craig participated in mindfulness exercise and gratitude practice. Cathryn Craig made positive progress toward goal. Continue education group to assess willingness to engage, assess transfer of knowledge, and participate in skill development.    Tx Plan Objective: 1.1, 1.2, 1.3, 1.4 , Therapist: Balaji Christine    Visit Time    Visit Start Time: 1330  Visit Stop Time: 1430  Total Visit Duration:  60 minutes    Subjective:     Patient ID: Cathryn Craig is a 34 y.o. female.    [facility] Clinical Progress Notes      Specialized Services Documentation  Therapist must complete separate progress note for each specific clinical activity in which the individual participated during the day.       GROUP PSYCHOTHERAPY    Cathryn Craig participated actively in psychotherapy group.  This was observed Consistent throughout the treatment day. They were engaged in learning related to wellness tools. Staff utilized Verbal and Demonstration teaching methods.  Cathryn Craig shared area of learning and set a goal for outside of program to work on self-care.  Cathryn Craig was able to share items explored during the day and shared in adding to their WRAP.  Ended  session with staff led exercise on geometric breathing.  Cathryn Craig demonstrated positive progress toward goal.  Continue group psychotherapy to actively practice learned skills and encourage transfer of knowledge to unstructured time.    Tx Plan Objective: 1.1, 1.2, 1.3, 1.4 , Therapist: Balaji Christine      Group Psychotherapy      Visit Start Time: (1215)  Visit Stop Time: (1315)  Total Visit Duration:  60 minutes    Subjective:     Patient ID: Cathryn Craig 34 y.o.     This group was facilitated virtually in a private office using HIPAA Compliant and Approved Microsoft Teams Cathryn Craig consented to the use of tele-video modality of treatment.   The group engaged in the wellness assessment, which evaluates progress on several different areas of wellness/wellbeing: physical, emotional, cognitive, personal development, social, spiritual, and activities of daily living. Clients rated their progress and discussed areas that need work. By completing and discussing areas of progress and challenges, members are connected and reminded that, in their mental health struggle, they are not alone. Topics of discussion revolved around positive experiences within each area of wellness as well as the challenging aspects to wellness within their past week. Cathryn continues to demonstrate progress towards goals through participation in group activity and personal disclosures. Continue psychotherapy and life skill groups to encourage further exploration of needs, personal awareness, and wellness tools.

## 2024-09-30 ENCOUNTER — APPOINTMENT (OUTPATIENT)
Dept: PSYCHOLOGY | Facility: CLINIC | Age: 34
End: 2024-09-30
Payer: COMMERCIAL

## 2024-09-30 PROCEDURE — H0035 MH PARTIAL HOSP TX UNDER 24H: HCPCS

## 2024-09-30 NOTE — PSYCH
Visit Time    Visit Start Time: 0830  Visit Stop Time: 0930  Total Visit Duration: {Psych Total Visit Time:04277}    Subjective:     Patient ID: Cathryn Craig is a 34 y.o. female.    [facility] Clinical Progress Notes      Specialized Services Documentation  Therapist must complete separate progress note for each specific clinical activity in which the individual participated during the day.       EDUCATION THERAPY      Cathryn Craig {SL AMB PSYCH ACTIVELY:38808} shared in morning assessment and goal review. Presented as {Readiness to Learnin} related to readiness to learn. Cathryn Craig  {DID/DID NOT:67728} complete goal from last treatment day identifying gaining {HEARS:39119}. Cathryn Craig {DID/DID NOT:75845} present with any barriers to learning. Throughout morning group, Cathryn Craig participated in {morning assessmemt experiences:51743}. Cathryn Craig made *** progress toward goal. Continue education group to assess willingness to engage, assess transfer of knowledge, and participate in skill development.    Tx Plan Objective: 1.1, 1.2, 1.3, 1.4 , Therapist: Balaji Christine    Visit Time    Visit Start Time: 1330  Visit Stop Time: 1430  Total Visit Duration: {Psych Total Visit Time:72531}    Subjective:     Patient ID: Cathryn Criag is a 34 y.o. female.    [facility] Clinical Progress Notes      Specialized Services Documentation  Therapist must complete separate progress note for each specific clinical activity in which the individual participated during the day.       GROUP PSYCHOTHERAPY    Cathryn Craig participated {SL AMB PSYCH ACTIVELY:43870} in psychotherapy group.  This was observed {consistent/inconsistent pain:8747817405} throughout the treatment day. They were engaged in learning related to {Education Completed:29320}. Staff utilized {Teaching Method:99027} teaching methods.  Cathryn Craig shared area of learning and set a goal for outside of program to ***.  Cathryn  Rafa was able to share items explored during the day and shared in adding to their WRAP.  Ended session with {staff/peer led:43166} led exercise ***.  Cathryn Craig demonstrated *** progress toward goal.  Continue group psychotherapy to actively practice learned skills and encourage transfer of knowledge to unstructured time.    Tx Plan Objective: 1.1, 1.2, 1.3, 1.4 , Therapist: Balaji Christine      Group Psychotherapy      Visit Start Time: (1215)  Visit Stop Time: (1315)  Total Visit Duration: {Psych Total Visit Time:04644}    Group Therapy    Subjective:     Patient ID: Cathryn Craig 34 y.o.       This group was facilitated virtually in a private office using HIPAA Compliant and Approved [employer] Teams.   Cathryn Craig actively  engaged in psychoeducational group about online support applications to enhance and enrich the group therapy skills. The group came up with strategies that helped them to use the apps and incorporate these apps into self improvement.  The group talked about understanding the purpose, type, and the timing of when to use these apps. Teaching on emergency situations and who to go to for help was brought up as well. Group was encouraged to ask questions in an open forum at the end of group. *** progress displayed through engagement in topic. Cathryn Craig will continue to engage in psychotherapy to encourage positive self realization.  Treatment Plan Objective 1.1, 1.2, 1.3, 1.4 Therapist: Balaji KULKARNI Ed.

## 2024-10-01 ENCOUNTER — TELEPHONE (OUTPATIENT)
Age: 34
End: 2024-10-01

## 2024-10-01 ENCOUNTER — TELEMEDICINE (OUTPATIENT)
Dept: PSYCHOLOGY | Facility: CLINIC | Age: 34
End: 2024-10-01
Payer: COMMERCIAL

## 2024-10-01 DIAGNOSIS — F43.21 ADJUSTMENT DISORDER WITH DEPRESSED MOOD: Primary | ICD-10-CM

## 2024-10-01 DIAGNOSIS — F16.91 HALLUCINOGEN USE DISORDER IN REMISSION: ICD-10-CM

## 2024-10-01 DIAGNOSIS — F41.1 GAD (GENERALIZED ANXIETY DISORDER): ICD-10-CM

## 2024-10-01 PROCEDURE — H0035 MH PARTIAL HOSP TX UNDER 24H: HCPCS

## 2024-10-01 NOTE — PSYCH
Visit Time    Visit Start Time: 0930  Visit Stop Time: 1030  Total Visit Duration: 60 minutes      Subjective:     Patient ID: Cathryn Craig is a 34 y.o. female.     [facility] Clinical Progress Notes      Specialized Services Documentation  Therapist must complete separate progress note for each specific clinical activity in which the individual participated during the day.      Group Psychotherapy - Anger Management   Cathryn Craig participated quietly in a psychotherapy group focused on Anger Management. Today group members focused on the warning signs of their anger cycle of anger, evaluating each aspect of the cycle, and apply personal experience to the cycle. Group members also were to complete a personal anger thermometer to track the stages of their anger development. The goal of today was for group members to participate in groups discussion on the cycle of anger, warning signs, and coping techniques. As well as evaluate coping techniques they could utilize to cope with anger and stop the cycle. Members evaluated their anger triggers and identified ways they struggle to work through anger on top of ways they cope. This writer encouraged members to openly share and integrate coping skills into their daily routine. Cathryn Craig made good efforts towards progress goals which were displayed through participation, notetaking, and engagement in topic.    Tx Plan Objective: 1.1,1.2,1.4 Therapist: Ashley Costenbader MA LMT          This group was facilitated virtually in a private office using HIPAA Compliant and Approved [employer] Teams.  Cathryn Craig consented to the use of tele-video modality of treatment.

## 2024-10-01 NOTE — PSYCH
Virtual Regular Visit    Verification of patient location:    Patient is located at Home in the following state in which I hold an active license [place]      Assessment/Plan:    Problem List Items Addressed This Visit       Adjustment disorder with depressed mood - Primary    KAREN (generalized anxiety disorder)    Hallucinogen use disorder in remission       Goals addressed in session: PHP VIRTUAL GROUP DUE TO virtual preference of care           Reason for visit is No chief complaint on file.       Encounter provider  PARTIAL PSYCH PROGRAM      Recent Visits  No visits were found meeting these conditions.  Showing recent visits within past 7 days and meeting all other requirements  Future Appointments  No visits were found meeting these conditions.  Showing future appointments within next 150 days and meeting all other requirements       The patient was identified by name and date of birth. Cathryn Craig was informed that this is a telemedicine visit and that the visit is being conducted throughthe Microsoft Teams platform. She agrees to proceed..  My office door was closed. No one else was in the room.  She acknowledged consent and understanding of privacy and security of the video platform. The patient has agreed to participate and understands they can discontinue the visit at any time.    Patient is aware this is a billable service.     Subjective  Cathryn rCaig is a 34 y.o. female I spent FIVE GROUP HOURS PLUS CASE MANAGEMENT minutes with patient today in which greater than 50% of time was spent in counseling/coordination of care regarding PHP - SEE NOTES  .      HPI     Past Medical History:   Diagnosis Date    Abnormal Pap smear of cervix     last pap 1/2024    Depression     no meds    Varicella     Vitiligo        Past Surgical History:   Procedure Laterality Date    REMOVAL OF INTRAUTERINE DEVICE (IUD)      5 year ago       Current Outpatient Medications   Medication Sig Dispense Refill    amoxicillin  (AMOXIL) 500 mg capsule  (Patient not taking: Reported on 2024)      fish oil-omega-3 fatty acids 1000 MG capsule Take 2 g by mouth daily      MAGNESIUM PO Take by mouth      Prenatal Multivit-Min-Fe-FA (PRE-ADEEL PO) Take by mouth      sertraline (Zoloft) 50 mg tablet Take 1 tablet (50 mg total) by mouth daily 30 tablet 1     No current facility-administered medications for this visit.        No Known Allergies    Review of Systems    Video Exam    There were no vitals filed for this visit.    Physical Exam     Visit Time    Visit Start Time: 830  Visit Stop Time: 1430  Total Visit Duration:  300 minutes

## 2024-10-01 NOTE — PSYCH
Subjective:     Patient ID: Cathryn Craig is a 34 y.o. female.    [facility] Clinical Progress Notes      Specialized Services Documentation  Therapist must complete separate progress note for each specific clinical activity in which the individual participated during the day.       Visit Time    Visit Start Time: 1045  Visit Stop Time: 1145  Total Visit Duration: 53 minutes    Group Psychotherapy Life Skills ([id number]) Cathryn Craig arrived late but actively engaged in group focused on the drama triangle which was facilitated virtually in a private office using HIPAA Compliant and Approved "[employer] Teams. Cathryn consented to the use of tele-video modality of treatment and was virtually present for group psychotherapy today. The group learned about the drama triangle and the roles of the victim, rescuer, and persecutor. Cathryn identified having been in the role of a victim. During the activity group members learned how to stop the drama triangle from continuing. Cathryn Craig  identified ways that they would stop the drama triangle from occuring. Cathryn continues to make progress towards goals through verbal participation in group; to accomplish long term goals continue to utilize skills learned in programming. Continue with psychotherapy to educate and encourage use of wellness tools. Tx Plan Objective: 1.1,1.2 Therapist: Farideh Lopez,MSW, LSW

## 2024-10-01 NOTE — PSYCH
Visit Time    Visit Start Time: 0830  Visit Stop Time: 0930  Total Visit Duration:  60 minutes    Subjective:     Patient ID: Cathryn Craig is a 34 y.o. female.    [facility] Clinical Progress Notes      Specialized Services Documentation  Therapist must complete separate progress note for each specific clinical activity in which the individual participated during the day.       EDUCATION THERAPY      Cathryn Craig actively shared in morning assessment and goal review. Presented as Receptive related to readiness to learn. Cathryn Craig  did complete goal from last treatment day identifying she completed her training for her new job, gaining responsibilityand hope. Cathryn Craig did not present with any barriers to learning. Throughout morning group, Cathryn Craig participated in mindfulness exercise and movement experience. aCthryn Craig made positive progress toward goal. Continue education group to assess willingness to engage, assess transfer of knowledge, and participate in skill development.    Tx Plan Objective: 1.1, 1.2, 1.3, 1.4 , Therapist: Balaji Christine    Visit Time    Visit Start Time: 1330  Visit Stop Time: 1430  Total Visit Duration:  60 minutes    Subjective:     Patient ID: Cathryn Craig is a 34 y.o. female.    [facility] Clinical Progress Notes      Specialized Services Documentation  Therapist must complete separate progress note for each specific clinical activity in which the individual participated during the day.       GROUP PSYCHOTHERAPY    Cathryn Craig participated actively in psychotherapy group.  This was observed Consistent throughout the treatment day. They were engaged in learning related to Wellness Tools. Staff utilized Verbal and Demonstration teaching methods.  Cathryn Craig shared area of learning and set a goal for outside of program to take a shower.  Cathryn Craig was able to share items explored during the day and shared in adding to their WRAP.   Ended session with staff led exercise on breathing technique.  Cathryn Craig demonstrated positive progress toward goal.  Continue group psychotherapy to actively practice learned skills and encourage transfer of knowledge to unstructured time.    Tx Plan Objective: 1.1, 1.2, 1.3, 1.4 , Therapist: Balaji Christine    Group Psychotherapy      Visit Start Time: (1215)  Visit Stop Time: (1315)  Total Visit Duration:  60 minutes    Subjective:     Patient ID: Cathryn Craig 34 y.o.     This group was facilitated virtually in a private office using HIPAA Compliant and Approved [employer] Teams.  Cathryn Craig actively engaged in psychoeducational group about the cognitive triad and challenging automatic  negative thoughts that involve cognitive bias, limiting thoughts that are based on a negative perception of ones self. The skill helps to identify negative thoughts and cognitive biases. The outcome being that negative thoughts are challenged in the thought process and regulation of emotion. Group discovered strategies that help them to manage negative thoughts and rethink the negative thoughts when faced with a negative challenge wether the thought is perceived in the outside environment, or internally as negative self-talk. The group talked about understanding the purpose of challenging negative thoughts on a personal and social level and how it affects themselves and others..Teaching on the emphasis of self monitoring and self-care, the group focus is geared how to go for help when the negative thoughts become overly intrusive. Group was encouraged to ask questions in an open forum at the end of session. Positive progress displayed through engagement in topic, Cathryn was an active participant in a think aloud process of re-framing.  Cathryn Craig will continue to engage in psychotherapy to encourage positive self realization.  Treatment Plan Objective 1.1, 1.2, 1.3, 1.4 Therapist: Balaji KULKARNI Ed.

## 2024-10-01 NOTE — BH TREATMENT PLAN
"Assessment/Plan:         Assessment        1. Adjustment disorder with depressed mood          2. KAREN (generalized anxiety disorder)          3. Hallucinogen use disorder in remission          4. Attempted suicide (HCC)             Subjective:        Subjective  Patient ID: Cathryn Craig is a 34 y.o. female.           Subjective:        Subjective  Patient ID: Cathryn Craig is a 34 y.o. female.     [facility] Treatment Plan   AREAS OF NEED: Symptoms of adjustment disorder with depressed mood, KAREN as evidenced by lack of motivation, low self confidence, worthlessness, feeling lost, confused on how to contribute, body dysmorphia, lack of routine, feeling overwhelmed, shakiness and some situational ruminations due to relationship and financial stressors.  Date Initiated: 09/20/24     Strengths: \"Listening to others, understanding how they feel, communication and solving problems\"               LONG TERM GOAL:   Date Initiated: 09/20/24  1.0  While at [facility], I will gain new skills/techniques/education/support/insights which I can use daily to decrease my symptoms and increase my quality of life.  Target Date: 10/18/2024  Completion Date:         SHORT TERM OBJECTIVES:      Date Initiated: 09/20/24  1.1 I will learn and practice daily a new coping technique to improve my day-to-day functioning.   Revision Date: 10/1/24 continued  Target Date: 10/01/2024  Completion Date:      Date Initiated: 09/20/24  1.2 I will learn three ways to communicate verbally when experiencing my emotions and share implementation of skills daily to build upon relationships.  Revision Date: 10/1/24 continued  Target Date: 10/01/2024  Completion Date:     Date Initiated: 09/20/24  1.3 I will take medications as prescribed and share questions and concerns if arise.    Revision Date:10/1/24 continued  Target Date: 10/01/2024  Completion Date:      Date Initiated: 09/20/24  1.4 I will identify 3 ways my supports can assist in my " recovery and agree to staff/support contact as indicated.    Revision Date:10/1/24 continued  Target Date: 10/01/2024  Completion Date:            7 DAY REVISION:   1.5 I will develop a new daily routine and apply it to my daily living.  Date Initiated:10/1/24   Revision Date:   Target Date: 10/11/24  Completion Date:        PSYCHIATRY:  Date Initiated:  09/20/24  Medication Management and Education       Revision Date: 10/1/24 continued      The person(s) responsible for carrying out the plan is Dr. Saurabh Sauceda, Tre Landon, DO; Siri Pope PA-C     NURSING/SYMPTOM EDUCATION:   Date Initiated: 09/20/24  1.1, 1.2. 1.3, 1.4 This RN/Or Therapist will provide wellness/symptoms and skill education groups three to five days weekly to educate Cathryn Craig on signs and symptoms of diagnoses, skills to manage, and medication questions that will be addressed by the treatment team.    Revision date: 10/1/24 continued  The person(s) responsible for carrying out the plan is LAURA Garcia RN; Balaji KULKARNI Ed     PSYCHOLOGY:   Date Initiated: 09/20/24       1.1, 1.2, 1.4 Provide psychotherapy group 5 times per week to allow opportunity for Cathryn Craig  to explore stressors and ways of coping.   Revision Date: 10/1/24 continued  The person(s) responsible for carrying out the plan is Farideh Lopez MSW,LSW; Cherry Hendrickson MA LMT     ALLIED THERAPY:   Date Initiated: 09/20/24  1.1,1.2 Engage Cathryn Craig in AT group 5 times weekly to encourage development and use of wellness tools to decrease symptoms and promote recovery through meaningful activity.  Revision Date: 10/1/24 continued      The person(s) responsible for carrying out the plan is RUTH Carlisle; Adam MIRANDA     CASE MANAGEMENT:   Date Initiated: 09/20/24      1.0 This  will meet with Cathryn Navarretejh  3-4 times weekly to assess treatment progress, discharge planning, connection to community supports and UR as  indicated.  Revision Date: 10/1/24 continued  The person(s) responsible for carrying out the plan is Mireya SANCHEZ RN     TREATMENT REVIEW/COMMENTS:      DISCHARGE CRITERIA: Identify 3 signs of progress and complete relapse prevention plan.    DISCHARGE PLAN: Connect with identified outpatient providers.   Estimated Length of Stay: 10 treatment days              Diagnosis and Treatment Plan explained to Cathryn, Cathryn relates understanding diagnosis and is agreeable to Treatment Plan.            CLIENT COMMENTS / Please share your thoughts, feelings, need and/or experiences regarding your treatment plan with Staff.  Please see follow up note with comments.        Signatures can be found on [facility] Treatment plan consent form.

## 2024-10-01 NOTE — PSYCH
Subjective:     Patient ID: Cathryn Craig 34 y.o. Female    [facility] Clinical Progress Notes      Specialized Services Documentation  Therapist must complete separate progress note for each specific clinical activity in which the individual participated during the day.     Case Management    ([id number]) Spoke with Cathryn Craig for case management. Reported no new concerns. Treatment plan was updated and reviewed. Cathryn Craig identified she would like to work on structure and set a daily routine for herself.  Cathryn Craig is aware of next scheduled 1:1 meeting.     Current suicide risk : Low      Medications changes/added/denied? None at this time     Treatment session number: 5     Individual Case Management Visit provided today? Yes      [facility] follow up physician's orders: Continue to follow orders.     Therapist: Ashley Costenbader MA LMT  Tx Plan Objective: 1.0

## 2024-10-02 ENCOUNTER — TELEMEDICINE (OUTPATIENT)
Dept: PSYCHOLOGY | Facility: CLINIC | Age: 34
End: 2024-10-02
Payer: COMMERCIAL

## 2024-10-02 ENCOUNTER — TELEMEDICINE (OUTPATIENT)
Dept: PSYCHIATRY | Facility: CLINIC | Age: 34
End: 2024-10-02
Payer: COMMERCIAL

## 2024-10-02 ENCOUNTER — DOCUMENTATION (OUTPATIENT)
Dept: PSYCHOLOGY | Facility: CLINIC | Age: 34
End: 2024-10-02

## 2024-10-02 DIAGNOSIS — F43.21 ADJUSTMENT DISORDER WITH DEPRESSED MOOD: Primary | ICD-10-CM

## 2024-10-02 DIAGNOSIS — F16.91 HALLUCINOGEN USE DISORDER IN REMISSION: ICD-10-CM

## 2024-10-02 DIAGNOSIS — T14.91XA ATTEMPTED SUICIDE (HCC): ICD-10-CM

## 2024-10-02 DIAGNOSIS — F41.1 GAD (GENERALIZED ANXIETY DISORDER): ICD-10-CM

## 2024-10-02 PROCEDURE — 99213 OFFICE O/P EST LOW 20 MIN: CPT | Performed by: PHYSICIAN ASSISTANT

## 2024-10-02 PROCEDURE — H0035 MH PARTIAL HOSP TX UNDER 24H: HCPCS

## 2024-10-02 NOTE — PROGRESS NOTES
Subjective:     Patient ID: Cathryn Craig is a 34 y.o. female.    [facility] Clinical Progress Notes      Specialized Services Documentation  Therapist must complete separate progress note for each specific clinical activity in which the individual participated during the day.       Late entry from 09/30/2024: Cathryn Craig did not attend [facility] due to emergency dental appointment. Excused absence.    Mireya SANCHEZ RN

## 2024-10-02 NOTE — PSYCH
"Visit Time    Visit Start Time: 0930  Visit Stop Time: 1030  Total Visit Duration:  60 minutes    Subjective:     Patient ID: Cathryn Craig is a 34 y.o. female.    [facility] Clinical Progress Notes      Specialized Services Documentation  Therapist must complete separate progress note for each specific clinical activity in which the individual participated during the day.     Allied Therapy 0930-1030 This group was facilitated virtually in a private office using HIPAA Compliant and Approved [employer] Teams. Cathryn Craig actively participated in music therapy group regarding healthy music listening. The group discussed their current use of music for self care. The group read and discussed a handout on healthy music listening. The group learned about the Iso Principle and how it can be applied to music listening for self care. The group then created individualized playlists for mood modulation using the above skills. Cathryn created a playlist to aid in moving from \"anxious\" to \"calm/chill\". Some effort noted towards treatment goal. Continue AT to encourage the development and proactive use of mood modulating techniques. Tx Plan Objective: 1.1,1.2,1.4, Therapist:  Adam Patel, JEREMIAH, MT-BC  "

## 2024-10-02 NOTE — PSYCH
"  Virtual Regular Visit    Verification of patient location:    Patient is located at Home in the following state in which I hold an active license [place]           Reason for visit is   Chief Complaint   Patient presents with    Virtual Regular Visit          Encounter provider Siri Pope PA-C      Recent Visits  Date Type Provider Dept   24 Telemedicine Siri Pope PA-C [facility] [place]   Showing recent visits within past 7 days and meeting all other requirements  Today's Visits  Date Type Provider Dept   10/02/24 Telemedicine Siri Pope PA-C [facility] [place]   Showing today's visits and meeting all other requirements  Future Appointments  No visits were found meeting these conditions.  Showing future appointments within next 150 days and meeting all other requirements       The patient was identified by name and date of birth. Cathryn Craig was informed that this is a telemedicine visit and that the visit is being conducted throughthe Microsoft Teams platform. She agrees to proceed..  My office door was closed. No one else was in the room.  She acknowledged consent and understanding of privacy and security of the video platform. The patient has agreed to participate and understands they can discontinue the visit at any time.    Patient is aware this is a billable service.       Progress Note - [facility]  Cathryn Craig 34 y.o. female MRN: [id number]   This note was not shared with the patient due to this is a psychotherapy note    Progress at partial program: improving.     Chart reviewed. Cathryn seen by Nba  at which time zoloft titration continued.  Cathryn prefers to keep at 25 mg.  Says her anxiety is manageable and mood is \"really good\". Denies irritability, manic/hypomanic symptoms. Continues to find program helpful.  Sleep is pretty good considering need to care for  at night.     ROS:   As above " otherwise negative    Active Ambulatory Problems     Diagnosis Date Noted    Alcohol consumption during pregnancy, antepartum 2024    Drug use affecting pregnancy, antepartum 2024    40 weeks gestation of pregnancy 2024    At increased risk for intimate partner violence 2024    Encounter for induction of labor 2024    Dysuria 2024     (spontaneous vaginal delivery) 2024    Oligohydramnios 2024    MDD (major depressive disorder) 2024    Acetaminophen overdose, intentional self-harm, initial encounter ([facility]) 2024    ADHD (attention deficit hyperactivity disorder) 2024    Unspecified mood (affective) disorder ([facility]) 2024    Adjustment disorder with depressed mood 2024    KAREN (generalized anxiety disorder) 2024    Hallucinogen use disorder in remission 2024    Attempted suicide ([facility]) 2024     Resolved Ambulatory Problems     Diagnosis Date Noted    No Resolved Ambulatory Problems     Past Medical History:   Diagnosis Date    Abnormal Pap smear of cervix     Depression     Varicella     Vitiligo      Family History   Problem Relation Age of Onset    No Known Problems Mother     No Known Problems Father     No Known Problems Sister     No Known Problems Brother     Alcohol abuse Maternal Grandfather     Depression Maternal Grandmother     Cancer Paternal Grandfather         brain    Cancer Paternal Grandmother         pancreatic     Social History     Socioeconomic History    Marital status: Single     Spouse name: Not on file    Number of children: Not on file    Years of education: Not on file    Highest education level: Not on file   Occupational History    Not on file   Tobacco Use    Smoking status: Some Days     Types: Cigarettes    Smokeless tobacco: Never    Tobacco comments:     Was vaping 5x/day prior to pregnancy (nicotine) - still vaping   Vaping Use    Vaping status: Every Day    Substances: Nicotine, Flavoring    Substance and Sexual Activity    Alcohol use: Yes     Alcohol/week: 10.0 standard drinks of alcohol     Types: 10 Shots of liquor per week    Drug use: Not Currently     Comment: ketamine, cocaine - not since 9/2023    Sexual activity: Yes     Partners: Male   Other Topics Concern    Not on file   Social History Narrative    Not on file     Social Determinants of Health     Financial Resource Strain: Not on File (3/21/2022)    Received from [employer]    Financial Resource Strain     Financial Resource Strain: 0   Food Insecurity: Food Insecurity Present (8/9/2024)    Hunger Vital Sign     Worried About Running Out of Food in the Last Year: Sometimes true     Ran Out of Food in the Last Year: Never true   Transportation Needs: Unmet Transportation Needs (8/9/2024)    PRAPARE - Transportation     Lack of Transportation (Medical): No     Lack of Transportation (Non-Medical): Yes   Physical Activity: Not on File (3/21/2022)    Received from [employer]    Physical Activity     Physical Activity: 0   Stress: Not on File (3/21/2022)    Received from [employer]    Stress     Stress: 0   Social Connections: Not on File (3/21/2022)    Received from [employer]    Social Connections     Social Connections and Isolation: 0   Intimate Partner Violence: Not on file   Housing Stability: High Risk (8/9/2024)    Housing Stability Vital Sign     Unable to Pay for Housing in the Last Year: Yes     Number of Times Moved in the Last Year: 8     Homeless in the Last Year: Yes     No Known Allergies       Mental Status Evaluation:    Appearance:  age appropriate   Behavior:  pleasant, cooperative   Speech:  normal rate and volume   Mood:  improved   Affect:  mood-congruent   Thought Process:  goal directed   Associations: intact associations   Thought Content:  no overt delusions   Perceptual Disturbances: none   Risk Potential: Suicidal ideation - None  Homicidal ideation - None   Sensorium:  oriented to person, place, and time/date   Memory:  recent and  remote memory grossly intact   Consciousness:  alert and awake   Attention: attention span and concentration are age appropriate   Insight:  intact   Judgment: intact   Gait/Station: unable to assess   Motor Activity: unable to assess today due to virtual visit       Laboratory results: I have personally reviewed all pertinent laboratory/tests results.      Assessment   Diagnoses and all orders for this visit:    Adjustment disorder with depressed mood    KAREN (generalized anxiety disorder)       Current Outpatient Medications   Medication Sig Dispense Refill    amoxicillin (AMOXIL) 500 mg capsule  (Patient not taking: Reported on 2024)      fish oil-omega-3 fatty acids 1000 MG capsule Take 2 g by mouth daily      MAGNESIUM PO Take by mouth      Prenatal Multivit-Min-Fe-FA (PRE- PO) Take by mouth      sertraline (Zoloft) 50 mg tablet Take 1 tablet (50 mg total) by mouth daily 30 tablet 1     No current facility-administered medications for this visit.         Plan    Planned medication and treatment changes:  Cathryn prefers to keep zoloft at 25 mg/d.  Symptoms improving.     All current active medications have been reviewed.  Continue treatment with group therapy and partial program      Risks / Benefits of Treatment:    Risks, benefits, and possible side effects of medications explained to patient and patient verbalizes understanding and agrees to medications.     Counseling / Coordination of Care:    Patient's progress discussed with staff in treatment team meeting.  Medications, treatment progress and treatment plan reviewed with patient.    Siri Pope PA-C 10/02/24

## 2024-10-02 NOTE — PSYCH
Visit Time    Visit Start Time: 1045  Visit Stop Time: 1145  Total Visit Duration: 60 minutes    Subjective:     Patient ID: Cathryn Craig is a 34 y.o. female.    [facility] Clinical Progress Notes      Specialized Services Documentation  Therapist must complete separate progress note for each specific clinical activity in which the individual participated during the day.       Group Psychotherapy Life Skills ([id number]) Cathryn Craig actively engaged in group focused on the empowerment triangle which was facilitated virtually in a private office using HIPAA Compliant and Approved [employer] Teams. Cathryn consented to the use of tele-video modality of treatment and was virtually present for group psychotherapy today. The group learned about the empowerment triangle and the roles of the creator, , and challenger. The group examined how to shift their mindset from their role(s) in the drama triangle to their role(s) in the Empowerment [place]. Cathryn discussed how they would shift their mindset from their role in the drama triangle to their role in the empowerment triangle by being more productive and doing things for herself and practicing self care. Participants shared what makes them feel empowered. Cathryn continues to make progress towards goals through verbal participation in group; to accomplish long term goals continue to utilize skills learned in programming. Continue with psychotherapy to educate and encourage use of wellness tools. Tx Plan Objective: 1.1,1.2 Therapist: Farideh Lopez,JASON,JANNAW

## 2024-10-02 NOTE — PSYCH
Virtual Regular Visit    Verification of patient location:    Patient is located at Home in the following state in which I hold an active license [place]      Assessment/Plan:    Problem List Items Addressed This Visit       Adjustment disorder with depressed mood - Primary    KAREN (generalized anxiety disorder)    Hallucinogen use disorder in remission    Attempted suicide (HCC)       Goals addressed in session:           Reason for visit is PHP VIRTUAL GROUP DUE TO virtual preference of care         Encounter provider  PARTIAL PSYCH PROGRAM      Recent Visits  No visits were found meeting these conditions.  Showing recent visits within past 7 days and meeting all other requirements  Future Appointments  No visits were found meeting these conditions.  Showing future appointments within next 150 days and meeting all other requirements       The patient was identified by name and date of birth. Cathryn Craig was informed that this is a telemedicine visit and that the visit is being conducted throughthe Microsoft Teams platform. She agrees to proceed..  My office door was closed. No one else was in the room.  She acknowledged consent and understanding of privacy and security of the video platform. The patient has agreed to participate and understands they can discontinue the visit at any time.    Patient is aware this is a billable service.     Subjective  Cathryn Craig is a 34 y.o. female  .      HPI     Past Medical History:   Diagnosis Date    Abnormal Pap smear of cervix     last pap 1/2024    Depression     no meds    Varicella     Vitiligo        Past Surgical History:   Procedure Laterality Date    REMOVAL OF INTRAUTERINE DEVICE (IUD)      5 year ago       Current Outpatient Medications   Medication Sig Dispense Refill    amoxicillin (AMOXIL) 500 mg capsule  (Patient not taking: Reported on 9/23/2024)      fish oil-omega-3 fatty acids 1000 MG capsule Take 2 g by mouth daily      MAGNESIUM PO Take by mouth       Prenatal Multivit-Min-Fe-FA (PRE- PO) Take by mouth      sertraline (Zoloft) 50 mg tablet Take 1 tablet (50 mg total) by mouth daily 30 tablet 1     No current facility-administered medications for this visit.        No Known Allergies    Review of Systems    Video Exam    There were no vitals filed for this visit.    Physical Exam     I spent FOUR GROUP HOURS PLUS CASE MANAGEMENT minutes with patient today in which greater than 50% of time was spent in counseling/coordination of care regarding PHP - SEE NOTES

## 2024-10-02 NOTE — PSYCH
Subjective:     Patient ID: Cathryn Craig is a 34 y.o. female.    [facility] Clinical Progress Notes      Specialized Services Documentation  Therapist must complete separate progress note for each specific clinical activity in which the individual participated during the day.     Case Management Note    Mireya SANCHEZ RN    Current suicide risk : Low     A case management session is not scheduled today with Cathryn Craig ; additionally, they did not request a CM meeting.  Cathryn Craig is aware of next scheduled 1:1.    Medications changes/added/denied? No, See today's med check note from Siri Pope PA-C.     Treatment session number: 6    Individual Case Management Visit provided today? No    [facility] follow up physician's orders: None at this time , See today's med check note from Siri Pope PA-C.

## 2024-10-02 NOTE — PSYCH
Visit Time    Visit Start Time: 0830  Visit Stop Time: 0930  Total Visit Duration:  60 minutes    Subjective:     Patient ID: Cathryn Craig is a 34 y.o. female.    [facility] Clinical Progress Notes      Specialized Services Documentation  Therapist must complete separate progress note for each specific clinical activity in which the individual participated during the day.       EDUCATION THERAPY      Cathryn Craig actively shared in morning assessment and goal review. Presented as Receptive related to readiness to learn. Cathryn Craig  did complete goal from last treatment day identifying she took a shower, gaining responsibility. Cathryn Craig did not present with any barriers to learning. Throughout morning group, Cathryn Craig participated in mindfulness exercise and gratitude practice. Cathryn Craig made positive progress toward goal. Continue education group to assess willingness to engage, assess transfer of knowledge, and participate in skill development.    Tx Plan Objective: 1.1, 1.2, 1.3, 1.4 , Therapist: Balaji Christine    Visit Time    Visit Start Time: 1330  Visit Stop Time: 1430  Total Visit Duration:  60 minutes    Subjective:     Patient ID: Cathryn Craig is a 34 y.o. female.    [facility] Clinical Progress Notes      Specialized Services Documentation  Therapist must complete separate progress note for each specific clinical activity in which the individual participated during the day.       GROUP PSYCHOTHERAPY    Cathryn Craig participated actively in psychotherapy group.  This was observed Consistent throughout the treatment day. They were engaged in learning related to Wellness Tools. Staff utilized Verbal, Written, and Demonstration teaching methods.  Cathryn Craig shared area of learning and set a goal for outside of program to work on making a grocery list.  Cathryn Craig was able to share items explored during the day and shared in adding to their WRAP.  Ended  session with staff led exercise on auditory meditation.  Cathryn Craig demonstrated positive progress toward goal.  Continue group psychotherapy to actively practice learned skills and encourage transfer of knowledge to unstructured time.    Tx Plan Objective: 1.1, 1.2, 1.3, 1.4 , Therapist: Balaji Christine    Group Psychotherapy      Visit Start Time: (1215)  Visit Stop Time: (1315)  Total Visit Duration:  60 minutes    This group was facilitated virtually in a private office using HIPAA Compliant and Approved [employer] Teams.  Cathryn Craig actively engaged in psychoeducational group about self affirmations. The skill helps to maintain and regulate positive self affirmations and positive self image. Group discovered strategies and statements that help them to manage positive well being on a short term and long term basis. The group talked about understanding the purpose, and meaning of self affirmation in intellectual and emotional expression, regulation , and recognition, and how it affects themselves and others. Teaching on the emphasis of positive self affirmation and self-care, who the group can go to for help was brought up as well. Group was encouraged to ask questions in an open forum at the end of group. Positive progress displayed through engagement in topic, Cathryn was able to explain the importance of goal setting in making affirmations for herself.Cathryn Craig will continue to engage in psychotherapy to encourage positive self realization.  Treatment Plan Objective 1.1, 1.2, 1.3, 1.4 Therapist: Balaji KULKARNI Ed.

## 2024-10-03 ENCOUNTER — TELEMEDICINE (OUTPATIENT)
Dept: PSYCHOLOGY | Facility: CLINIC | Age: 34
End: 2024-10-03
Payer: COMMERCIAL

## 2024-10-03 DIAGNOSIS — T14.91XA ATTEMPTED SUICIDE (HCC): ICD-10-CM

## 2024-10-03 DIAGNOSIS — F43.21 ADJUSTMENT DISORDER WITH DEPRESSED MOOD: Primary | ICD-10-CM

## 2024-10-03 DIAGNOSIS — F16.91 HALLUCINOGEN USE DISORDER IN REMISSION: ICD-10-CM

## 2024-10-03 DIAGNOSIS — F41.1 GAD (GENERALIZED ANXIETY DISORDER): ICD-10-CM

## 2024-10-03 PROCEDURE — H0035 MH PARTIAL HOSP TX UNDER 24H: HCPCS

## 2024-10-03 NOTE — PSYCH
Subjective:     Patient ID: Cathryn Craig 34 y.o. Female    [facility] Clinical Progress Notes      Specialized Services Documentation  Therapist must complete separate progress note for each specific clinical activity in which the individual participated during the day.     Case Management    ([id number]) Spoke with Cathryn Craig for case management. Reported concerned to double her medication that was recommended by DUANE. Reported she feels it to soon to double her does. Reported no  other concerns. Stated she feel her symptoms are less and utilizing coping skills. . Cathryn Craig is aware of next scheduled 1:1 meeting.     Current suicide risk : Low      Medications changes/added/denied? Yes, see Siri Pope's Note     Treatment session number: 7     Individual Case Management Visit provided today? Yes      [facility] follow up physician's orders: Continue to follow orders.     Therapist: Ashley Costenbader MA LMT  Tx Plan Objective: 1.0

## 2024-10-03 NOTE — PSYCH
Visit Time     Visit Start Time: 1215  Visit Stop Time: 1315  Total Visit Duration: 60 minutes  Subjective:     Patient ID: Cathryn Craig is a 34 y.o. female.     Group Psychotherapy - Mindfulness   This group was facilitated face to face in a private office setting using HIPAA compliant protocols. Cathryn Craig attended group on Mindfulness.  Today members focused on mindfulness. This writer facilitated a discussion on:  what is mindfulness?  Obstacles to being mindful?  Importance of mindfulness?  Ways you practice mindfulness?  This writer encouraged members to engage and participate in the group activity and discussion. Cathryn Craig made good efforts towards progress goals which were displayed through participation and engagement in topic.   Tx Plan Objective:1.1,1.2,1.4  Ashley Costenbader MA LMT

## 2024-10-03 NOTE — PSYCH
Visit Time    Visit Start Time: 0830  Visit Stop Time: 0930  Total Visit Duration:  50 minutes    Subjective:     Patient ID: Cathryn Craig is a 34 y.o. female.    [facility] Clinical Progress Notes      Specialized Services Documentation  Therapist must complete separate progress note for each specific clinical activity in which the individual participated during the day.       EDUCATION THERAPY      Cathryn Craig actively shared in morning assessment and goal review. Presented as Receptive related to readiness to learn. Cathryn Craig  did not complete goal from last treatment day identifying she wanted to create a grocery list, but did not have the time,gaining responsibility. Cathryn Craig did not present with any barriers to learning. Throughout morning group, Cathryn Craig participated in mindfulness exercise and gratitude practice. Cathryn Craig made positive progress toward goal. Continue education group to assess willingness to engage, assess transfer of knowledge, and participate in skill development.    Tx Plan Objective: 1.1, 1.2, 1.3, 1.4 , Therapist: Balaji Christine    Visit Time    Visit Start Time: 1330  Visit Stop Time: 1430  Total Visit Duration:  45 minutes    Subjective:     Patient ID: Cathryn Craig is a 34 y.o. female.    [facility] Clinical Progress Notes      Specialized Services Documentation  Therapist must complete separate progress note for each specific clinical activity in which the individual participated during the day.       GROUP PSYCHOTHERAPY    Cathryn Craig participated actively in psychotherapy group.  This was observed Consistent throughout the treatment day. They were engaged in learning related to Wellness Tools. Staff utilized Verbal teaching methods.    Cathryn Craig was able to share items explored during the day and shared in adding to their WRAP.  Ended session with peer  led exercise on breathing techniques.  Cathryn Craig demonstrated  positive progress toward goal.  Continue group psychotherapy to actively practice learned skills and encourage transfer of knowledge to unstructured time.    Tx Plan Objective: 1.1, 1.2, 1.3, 1.4 , Therapist: Balaji Christine    Group Psychotherapy      Visit Start Time: (1045)  Visit Stop Time: (1145)  Total Visit Duration:  60 minutes    Group Therapy    Subjective:     Patient ID: Cathryn Craig 34 y.o.       This group was facilitated virtually in a private office using HIPAA Compliant and Approved [employer] Teams.  Cathryn Craig actively engaged in psychoeducational group about emotional regulation. The skill helps to maintain and regulate emotional expression/regulation. Group discovered strategies that help them to manage emotional well being on a short term and long term basis. The group talked about understanding the purpose, and meaning of emotional regulation in intellectual and emotional expression, regulation , and recognition, and how it affects themselves and others.. Teaching on the emphasis of self monitoring and self-care, who the group can go to for help was brought up as well. Group was encouraged to ask questions in an open forum at the end of group. Positive progress displayed through engagement in topic, Cathryn was an active member of the group discussion this session. Cathryn Craig will continue to engage in psychotherapy to encourage positive self realization.  Treatment Plan Objective 1.1, 1.2, 1.3, 1.4 Therapist: Balaji KULKARNI Ed.

## 2024-10-03 NOTE — PSYCH
Visit Time    Visit Start Time: 0930  Visit Stop Time: 1030  Total Visit Duration:  60 minutes    Subjective:     Patient ID: Cathryn Craig is a 34 y.o. female.    [facility] Clinical Progress Notes      Specialized Services Documentation  Therapist must complete separate progress note for each specific clinical activity in which the individual participated during the day.       Group Psychotherapy (0930-1030) Cathryn engaged psychotherapy group focused on Radical Acceptance.  Followed by a worksheet exploring ways to respond when a serious problem comes into your life.  Group discussed impact on treatment and ways to increase acceptance in different areas of our lives. Turning the mind, willingness, and half-smiling /willing hands were also handouts given that went along with the distress tolerance topic. Progressive muscle relaxation exercise aided in closing the group.  Continue psychotherapy to explore wellness strategies and encourage personal practice.  Good effort noted toward treatment goals Tx Plan Objective: 1.1, 1.2, 1.4 Therapist:  Chico Laughlin MA, NCC

## 2024-10-04 ENCOUNTER — TELEMEDICINE (OUTPATIENT)
Dept: PSYCHOLOGY | Facility: CLINIC | Age: 34
End: 2024-10-04
Payer: COMMERCIAL

## 2024-10-04 DIAGNOSIS — F16.91 HALLUCINOGEN USE DISORDER IN REMISSION: ICD-10-CM

## 2024-10-04 DIAGNOSIS — T14.91XA ATTEMPTED SUICIDE (HCC): ICD-10-CM

## 2024-10-04 DIAGNOSIS — F43.21 ADJUSTMENT DISORDER WITH DEPRESSED MOOD: Primary | ICD-10-CM

## 2024-10-04 DIAGNOSIS — F41.1 GAD (GENERALIZED ANXIETY DISORDER): ICD-10-CM

## 2024-10-04 PROCEDURE — H0035 MH PARTIAL HOSP TX UNDER 24H: HCPCS

## 2024-10-04 NOTE — PSYCH
Subjective:   Patient ID: Cathryn Craig is a 34 y.o. female.    [facility] Clinical Progress Notes      Specialized Services Documentation  Therapist must complete separate progress note for each specific clinical activity in which the individual participated during the day.     Group Psychotherapy   Visit Start Time: 0930  Visit Stop Time: 1030  Total Visit Duration: 60 minutes  Group was facilitated virtually in a private office using HIPAA compliant and approved [employer] Teams. Cathryn Craig consented to the use of tele-video modality of treatment and was virtually present for group psychotherapy.  Cathryn Craig shared a limited amount in group focused on anxiety symptom education. Cathryn appeared distracted throughout group with her baby she was holding, appeared to pass them to someone else in the room and was on and off camera throughout. Group started with reviewing Power point presentation that defined anxiety. Questions related to anxiety that group answered consisted of:   What are three things that trigger your anxiety?  What are three physical symptoms that you experience when you feel anxious?  What are three thoughts you tend to have when you feel anxious?  What are three things you do to cope when you are anxious?  Cathryn did not share an answer to these questions.   Group then explored common somatic symptoms of anxiety, learned about the cycle of anxiety, the fight/flight/freeze responses, and ways to cope with anxiety. Group participated in guided mindful meditation. Group was provided with handouts on the differences between anxiety, panic attacks, and social anxiety consisted of. Limited progress noted toward treatment goals. Continue with group to further understand and cope with anxiety symptoms.   Tx Plan Objective: 1.1,1.2,1.4, Therapist:  RUTH Smart

## 2024-10-04 NOTE — PSYCH
"Visit Time    Visit Start Time: 1045  Visit Stop Time: 1145  Total Visit Duration:  60 minutes    Subjective:     Patient ID: Cathryn Craig is a 34 y.o. female.    [facility] Clinical Progress Notes      Specialized Services Documentation  Therapist must complete separate progress note for each specific clinical activity in which the individual participated during the day.     Allied Therapy [id number] This group was facilitated virtually in a private office using HIPAA Compliant and Approved [employer] Teams. Cathryn Craig actively participated in music therapy group regarding self-validation. The group participated in a cj discussion on the song “Hated”. Group members were asked to share examples of times where they felt invalidated by others. The group then read and discussed the handout on self validation and related the content to their experience. The group participated in a cj rewrite of the song “I am Light”, identifying affirmations for self-validation. Cathryn shared \"I am love, I am not what the haters say\" as an affirmation of self-validation to use outside of program. Some effort noted towards treatment goal. Continue AT to encourage the development and proactive use of self-validating techniques.  Tx Plan Objective: 1.1,1.2,1.4, Therapist:  JEREMIAH Pineda, MT-BC  "

## 2024-10-04 NOTE — PSYCH
Subjective:     Patient ID: Cathryn Craig 34 y.o. Female    [facility] Clinical Progress Notes      Specialized Services Documentation  Therapist must complete separate progress note for each specific clinical activity in which the individual participated during the day.     Case Management Note    Ashley Costenbader MA LMT    Current suicide risk : Low     A case management session is not scheduled today with Cathryn Craig ; additionally, they did not request a CM meeting.  Cathryn Navarretemistylisette is aware of next scheduled 1:1.    Medications changes/added/denied? None at this time     Treatment session number: 8    Individual Case Management Visit provided today? No    [facility] follow up physician's orders: None at this time

## 2024-10-07 ENCOUNTER — TELEMEDICINE (OUTPATIENT)
Dept: PSYCHIATRY | Facility: CLINIC | Age: 34
End: 2024-10-07
Payer: COMMERCIAL

## 2024-10-07 ENCOUNTER — TELEMEDICINE (OUTPATIENT)
Dept: PSYCHOLOGY | Facility: CLINIC | Age: 34
End: 2024-10-07
Payer: COMMERCIAL

## 2024-10-07 DIAGNOSIS — F41.1 GAD (GENERALIZED ANXIETY DISORDER): ICD-10-CM

## 2024-10-07 DIAGNOSIS — T14.91XA ATTEMPTED SUICIDE (HCC): ICD-10-CM

## 2024-10-07 DIAGNOSIS — F43.21 ADJUSTMENT DISORDER WITH DEPRESSED MOOD: Primary | ICD-10-CM

## 2024-10-07 DIAGNOSIS — F16.91 HALLUCINOGEN USE DISORDER IN REMISSION: ICD-10-CM

## 2024-10-07 PROCEDURE — 99213 OFFICE O/P EST LOW 20 MIN: CPT | Performed by: PHYSICIAN ASSISTANT

## 2024-10-07 PROCEDURE — H0035 MH PARTIAL HOSP TX UNDER 24H: HCPCS

## 2024-10-07 NOTE — PSYCH
Visit Time    Visit Start Time: 0830  Visit Stop Time: 0930  Total Visit Duration:  30 minutes    Subjective:     Patient ID: Cathryn Craig is a 34 y.o. female.    [facility] Clinical Progress Notes      Specialized Services Documentation  Therapist must complete separate progress note for each specific clinical activity in which the individual participated during the day.       EDUCATION THERAPY      Cathryn Craig had a case management meeting for a portion of this session. Cathryn actively shared in morning assessment and goal review. Presented as Receptive related to readiness to learn. Cathryn Craig did not present with any barriers to learning. Throughout morning group, Cathryn Craig participated in mindfulness exercise and movement experience. Cathryn Craig made positive progress toward goal. Continue education group to assess willingness to engage, assess transfer of knowledge, and participate in skill development.    Tx Plan Objective: 1.1, 1.2, 1.3, 1.4 , Therapist: Balaji Christine    Visit Time    Visit Start Time: 1330  Visit Stop Time: 1430  Total Visit Duration:  0 minutes    Subjective:     Patient ID: Cathryn Craig is a 34 y.o. female.    [facility] Clinical Progress Notes      Specialized Services Documentation  Therapist must complete separate progress note for each specific clinical activity in which the individual participated during the day.       GROUP PSYCHOTHERAPY    Cathryn Craig was not present for this session    Group Psychotherapy      Visit Start Time: (1045)  Visit Stop Time: (1145)  Total Visit Duration:  0 minutes    Subjective:     Patient ID: Cathryn Craig 34 y.o.     This group was facilitated virtually in a private office using HIPAA Compliant and Approved [employer] Teams.      Cathryn Craig was not present for this session.  The group focused on the interpersonal effectiveness pillar of DBT; specifically, looking at the RJ acronym.  Participants followed a step by step breakdown of the RJ process and were encouraged to engage in open discussion throughout. Group members were given a RJ practice worksheet and time to practice. Cathryn Craig took notes and filled out his practice sheet. Continue to note progress towards goals. Continue with psychotherapy to encourage further development of interpersonal effectiveness skills.  Tx Plan Objective 1.1, 1.2, 1.4 Therapist: WENDY Christine

## 2024-10-07 NOTE — PSYCH
Subjective:   Patient ID: Cathryn Craig is a 34 y.o. female.    [facility] Clinical Progress Notes      Specialized Services Documentation  Therapist must complete separate progress note for each specific clinical activity in which the individual participated during the day.     Group Psychotherapy   Visit Start Time: 0930  Visit Stop Time: 1030  Total Visit Duration: 0 minutes  Group was facilitated virtually in a private office using HIPAA compliant and approved [employer] Teams. Cathryn Craig consented to the use of tele-video modality of treatment and was virtually present for group psychotherapy.  Cathryn Craig did not attend group focused on healthy sleep hygiene from the PLEASE skill.  Unable to note progress noted toward treatment goals. Continue with group to further practice healthy sleep hygiene.   Tx Plan Objective: n/a, Therapist:  RUTH Smart

## 2024-10-07 NOTE — PSYCH
Visit Time    Visit Start Time: 1215  Visit Stop Time: 1315  Total Visit Duration:  0 minutes    Subjective:     Patient ID: Cathryn Craig is a 34 y.o. female.    [facility] Clinical Progress Notes      Specialized Services Documentation  Therapist must complete separate progress note for each specific clinical activity in which the individual participated during the day.     Allied Therapy [id number] This group was facilitated virtually in a private office using HIPAA Compliant and Approved [employer] Teams. Cathryn Craig was not present in music therapy,  aware.  Tx Plan Objective: 1.1,1.2,1.4, Therapist:  JEREMIAH Pineda, MT-BC
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
## 2024-10-08 ENCOUNTER — APPOINTMENT (OUTPATIENT)
Dept: PSYCHOLOGY | Facility: CLINIC | Age: 34
End: 2024-10-08
Payer: COMMERCIAL

## 2024-10-08 ENCOUNTER — DOCUMENTATION (OUTPATIENT)
Dept: PSYCHOLOGY | Facility: CLINIC | Age: 34
End: 2024-10-08

## 2024-10-08 DIAGNOSIS — T14.91XA ATTEMPTED SUICIDE (HCC): ICD-10-CM

## 2024-10-08 DIAGNOSIS — F43.21 ADJUSTMENT DISORDER WITH DEPRESSED MOOD: Primary | ICD-10-CM

## 2024-10-08 DIAGNOSIS — F41.1 GAD (GENERALIZED ANXIETY DISORDER): ICD-10-CM

## 2024-10-08 DIAGNOSIS — F16.91 HALLUCINOGEN USE DISORDER IN REMISSION: ICD-10-CM

## 2024-10-08 NOTE — PSYCH
"Visit Time    Start time: 1045   End time: 1145  Total time: 60 minutes  Date: 10/8/2024    Patient ID: Cathryn Craig 34 y.o. female    [facility] Clinical Progress Notes       Specialized Services Documentation  Therapist must complete separate progress note for each specific clinical activity in which the individual participated during the day.      Psychotherapy  Group was facilitated virtually in a private office using HIPAA compliant and approved Microsoft Teams. Cathryn Craig consented to the use of tele-video modality of treatment and was virtually present for group psychotherapy.  Cathryn Craig *** participated in MTH group focused on Urge Surfing. Group started off with a cj analysis of \"Chasing Pavements\" by Faiza. Group discussed definition of urges, and methods of managing unwanted behaviors. The topic was discussed and and alternative coping mechanisms were presented. The group then participated in a breathing exercise to practice a relation technique for times for when urges arise. The group the discussed \"Triggers\" and completed the Triggers worksheet, defining:  Identifying Triggers and being able to give them a name  Becoming mindful of symptoms of a trigger  Places/People/Thoughts/Activities that can be potential triggers  Strategies to avoiding, reducing exposure, or coping directly with triggers.  Cathryn participated by taking notes and asking/answering questions. Throughout the group, Cathryn participated in mindfulness exercises, breathing techniques, and distress tolerance skills. The A.C.C.E.P.T.S. (Activities, Contributions, Comparisons, Emotions, Pushing Away, thoughts, and Sensations) was reviewed as a means for distress tolerance through an urge. Building a Pros & Cons list when reflecting/anticipating and urge was also reviewed. \"Skill Stacking\" and building up as many coping techniques as possible to overcome urges was the main theme of this session. Overall, *** effort " observed and noted throughout this session. Continue with psychotherapy to practice mindfulness and continue to explore music making.     Tx Plan Objective: 1.1, 1.2, 1.4 Therapist: RUTH Thomas      Visit Time    Start time: 1330   End time: 1430   Total time: {Psych Total Visit Time:04193}   Date: 10/8/2024    Subjective:    Patient ID: Cathryn Craig is a 34 y.o. Female    [facility] Clinical Progress Notes      Specialized Services Documentation  Therapist must complete separate progress note for each specific clinical activity in which the individual participated during the day.     Group Psychotherapy  Patient ID: Cathryn Craig 34 y.o. female  Group was facilitated virtually in a private office using HIPAA compliant and approved [employer] Teams. Cathryn Craig consented to the use of tele-video modality of treatment and was virtually present for group psychotherapy.Cathryn Craig {SL AMB PSYCH ACTIVELY:52020} participated in psychotherapy group. This was observed consistently throughout the treatment day. They were engaged in learning related to illness, education, aftercare and wellness tools. Staff utilized verbal, written, demonstration, auditory/visual teaching methods. Cathryn Craig shared area of learning and set a goal for outside of program to ***.  Cathryn Craig was able to share items explored during the day and shared in adding to their WRAP. Ended session with group member/staff led mindfulness experience. Cathryn Craig demonstrated *** progress toward goal.  Continue group psychotherapy to actively practice learned skills and encourage transfer of knowledge to unstructured time.       Tx Plan Objective: 1.1, 1.2, 1.4 Therapist:  RUTH Arias
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
## 2024-10-09 ENCOUNTER — APPOINTMENT (OUTPATIENT)
Dept: PSYCHOLOGY | Facility: CLINIC | Age: 34
End: 2024-10-09
Payer: COMMERCIAL

## 2024-10-09 ENCOUNTER — DOCUMENTATION (OUTPATIENT)
Dept: PSYCHOLOGY | Facility: CLINIC | Age: 34
End: 2024-10-09

## 2024-10-09 NOTE — PROGRESS NOTES
HILARIA spoke with Cathryn Craig. She reported she moved out of her house and is staying with her Aunt in [place]. Reported she would like to continue treatment. Reported she will be attending groups tomorrow virtually.     Ashley CostenbaderMA LMT

## 2024-10-09 NOTE — PSYCH
Visit Time    Visit Start Time: 0830  Visit Stop Time: 0930  Total Visit Duration: {Psych Total Visit Time:75012}    Subjective:     Patient ID: Cathryn Craig is a 34 y.o. female.    [facility] Clinical Progress Notes      Specialized Services Documentation  Therapist must complete separate progress note for each specific clinical activity in which the individual participated during the day.       EDUCATION THERAPY      Cathryn Craig {SL AMB PSYCH ACTIVELY:66841} shared in morning assessment and goal review. Presented as {Readiness to Learnin} related to readiness to learn. Cathryn Craig  {DID/DID NOT:99109} complete goal from last treatment day identifying gaining {HEARS:28144}. Cathryn Craig {DID/DID NOT:28573} present with any barriers to learning. Throughout morning group, Cathryn Craig participated in {morning assessmemt experiences:44416}. Cathryn Craig made *** progress toward goal. Continue education group to assess willingness to engage, assess transfer of knowledge, and participate in skill development.    Tx Plan Objective: 1.1, 1.2, 1.3, 1.4 , Therapist: [facility]      Visit Start Time: (930)  Visit Stop Time: (1030)  Total Visit Duration: {Psych Total Visit Time:70339}    Subjective:     Patient ID: Cathryn Craig 34 y.o.      This group was facilitated virtually in a private office using HIPAA Compliant and Approved Microsoft Teams.  Cathryn Craig actively engaged in psychoeducational group about employment stressors. The skill helps to create effective work relationships. The group discovered strategies that help them to manage work relationships on a short term and long term basis. The group talked about understanding the purpose, and meaning of work stressors  in intellectual and emotional expression, regulation , and recognition, and how it affects themselves and others.. Teaching on the emphasis of self monitoring , suggestive solutions,  verbal and non-verbal communication,and keeping commitments, strategies were discusses to reduce work stressors.  Group was encouraged to ask questions in an open forum at the end of group. *** progress displayed through engagement in topic. Cathryn Broussardjeffreylisette will continue to engage in psychotherapy to encourage positive self realization.  Treatment Plan Objective 1.1, 1.2, 1.3, 1.4 Therapist: Balaji KULKARNI Ed.

## 2024-10-10 ENCOUNTER — TELEMEDICINE (OUTPATIENT)
Dept: PSYCHOLOGY | Facility: CLINIC | Age: 34
End: 2024-10-10
Payer: COMMERCIAL

## 2024-10-10 DIAGNOSIS — F41.1 GAD (GENERALIZED ANXIETY DISORDER): ICD-10-CM

## 2024-10-10 DIAGNOSIS — T14.91XA ATTEMPTED SUICIDE (HCC): ICD-10-CM

## 2024-10-10 DIAGNOSIS — F43.21 ADJUSTMENT DISORDER WITH DEPRESSED MOOD: Primary | ICD-10-CM

## 2024-10-10 DIAGNOSIS — F16.91 HALLUCINOGEN USE DISORDER IN REMISSION: ICD-10-CM

## 2024-10-10 PROCEDURE — H0035 MH PARTIAL HOSP TX UNDER 24H: HCPCS

## 2024-10-10 NOTE — PSYCH
Virtual Regular Visit    Verification of patient location:    Patient is located at Home in the following state in which I hold an active license [place]      Assessment/Plan:    Problem List Items Addressed This Visit       Adjustment disorder with depressed mood - Primary    KAREN (generalized anxiety disorder)    Hallucinogen use disorder in remission    Attempted suicide (HCC)       Goals addressed in session: PHP VIRTUAL GROUP DUE TO virtual preference of care           Reason for visit is   Chief Complaint   Patient presents with    Virtual Regular Visit        Encounter provider  PARTIAL PSYCH PROGRAM      Recent Visits  No visits were found meeting these conditions.  Showing recent visits within past 7 days and meeting all other requirements  Future Appointments  No visits were found meeting these conditions.  Showing future appointments within next 150 days and meeting all other requirements       The patient was identified by name and date of birth. Cathryn Craig was informed that this is a telemedicine visit and that the visit is being conducted throughthe Microsoft Teams platform. She agrees to proceed..  My office door was closed. No one else was in the room.  She acknowledged consent and understanding of privacy and security of the video platform. The patient has agreed to participate and understands they can discontinue the visit at any time.    Patient is aware this is a billable service.     Subjective  Cathryn Craig is a 34 y.o. female I spent FIVE GROUP HOURS PLUS CASE MANAGEMENT minutes with patient today in which greater than 50% of time was spent in counseling/coordination of care regarding PHP - SEE NOTES  .      HPI     Past Medical History:   Diagnosis Date    Abnormal Pap smear of cervix     last pap 1/2024    Depression     no meds    Varicella     Vitiligo        Past Surgical History:   Procedure Laterality Date    REMOVAL OF INTRAUTERINE DEVICE (IUD)      5 year ago       Current  Outpatient Medications   Medication Sig Dispense Refill    amoxicillin (AMOXIL) 500 mg capsule  (Patient not taking: Reported on 2024)      fish oil-omega-3 fatty acids 1000 MG capsule Take 2 g by mouth daily      MAGNESIUM PO Take by mouth      Prenatal Multivit-Min-Fe-FA (PRE-ADEEL PO) Take by mouth      sertraline (Zoloft) 50 mg tablet Take 1 tablet (50 mg total) by mouth daily 30 tablet 1     No current facility-administered medications for this visit.        No Known Allergies    Review of Systems    Video Exam    There were no vitals filed for this visit.    Physical Exam     Visit Time    Visit Start Time: 830  Visit Stop Time: 1430  Total Visit Duration:  300 minutes

## 2024-10-10 NOTE — PSYCH
Subjective:     Patient ID: Cathryn Craig 34 y.o. Female    [facility] Clinical Progress Notes      Specialized Services Documentation  Therapist must complete separate progress note for each specific clinical activity in which the individual participated during the day.     Case Management Note    Ashley Costenbader MA LMT    Current suicide risk : Low     A case management session is not scheduled today with Cathryn Craig ; additionally, they did not request a CM meeting.  Cathryn Navarretemistylisette is aware of next scheduled 1:1.    Medications changes/added/denied? None at this time     Treatment session number: 10    Individual Case Management Visit provided today? No    [facility] follow up physician's orders: None at this time

## 2024-10-10 NOTE — PSYCH
Virtual Regular Visit    Verification of patient location:    Patient is located at Home in the following state in which I hold an active license [place]      Assessment/Plan:    Problem List Items Addressed This Visit       Adjustment disorder with depressed mood - Primary    KAREN (generalized anxiety disorder)    Hallucinogen use disorder in remission    Attempted suicide (HCC)       Goals addressed in session:           Reason for visit is PHP VIRTUAL GROUP DUE TO virtual preference of care       Encounter provider  PARTIAL PSYCH PROGRAM      Recent Visits  No visits were found meeting these conditions.  Showing recent visits within past 7 days and meeting all other requirements  Future Appointments  No visits were found meeting these conditions.  Showing future appointments within next 150 days and meeting all other requirements       The patient was identified by name and date of birth. Cathryn Craig was informed that this is a telemedicine visit and that the visit is being conducted throughthe Microsoft Teams platform. She agrees to proceed..  My office door was closed. No one else was in the room.  She acknowledged consent and understanding of privacy and security of the video platform. The patient has agreed to participate and understands they can discontinue the visit at any time.    Patient is aware this is a billable service.     Subjective  Cathryn Craig is a 34 y.o. female  .      HPI     Past Medical History:   Diagnosis Date    Abnormal Pap smear of cervix     last pap 1/2024    Depression     no meds    Varicella     Vitiligo        Past Surgical History:   Procedure Laterality Date    REMOVAL OF INTRAUTERINE DEVICE (IUD)      5 year ago       Current Outpatient Medications   Medication Sig Dispense Refill    amoxicillin (AMOXIL) 500 mg capsule  (Patient not taking: Reported on 9/23/2024)      fish oil-omega-3 fatty acids 1000 MG capsule Take 2 g by mouth daily      MAGNESIUM PO Take by mouth       Prenatal Multivit-Min-Fe-FA (PRE- PO) Take by mouth      sertraline (Zoloft) 50 mg tablet Take 1 tablet (50 mg total) by mouth daily 30 tablet 1     No current facility-administered medications for this visit.        No Known Allergies    Review of Systems    Video Exam    There were no vitals filed for this visit.    Physical Exam     I spent FIVE GROUP HOURS PLUS CASE MANAGEMENT minutes with patient today in which greater than 50% of time was spent in counseling/coordination of care regarding PHP - SEE NOTES

## 2024-10-10 NOTE — PSYCH
"Visit Time    Visit Start Time: 1330  Visit Stop Time: 1430  Total Visit Duration:  60 minutes    Subjective:     Patient ID: Cathryn Craig is a 34 y.o. female.    [facility] Clinical Progress Notes      Specialized Services Documentation  Therapist must complete separate progress note for each specific clinical activity in which the individual participated during the day.       GROUP PSYCHOTHERAPY    Cathryn Craig participated actively in psychotherapy group.  This was observed Consistent throughout the treatment day. They were engaged in learning related to Wellness Tools. Staff utilized Verbal teaching methods.  Cathryn Craig shared area of learning and set a goal for outside of program to work on her housing options.  Cathryn Craig was able to share items explored during the day and shared in adding to their WRAP.  Ended session with peer  led exercise on breathing techniques.  Cathryn Craig demonstrated positive progress toward goal.  Continue group psychotherapy to actively practice learned skills and encourage transfer of knowledge to unstructured time.    Tx Plan Objective: 1.1, 1.2, 1.3, 1.4 , Therapist: Balaji Christine    Group Psychotherapy      Visit Start Time: (1215)  Visit Stop Time: (1315)  Total Visit Duration:  60 minutes      Group Therapy    Subjective:     Patient ID: Cathryn Craig 34 y.o.     This group was facilitated virtually in a private office using HIPAA Compliant and Approved [employer] Teams Cathryn Craig consented to the use of tele-video modality of treatment.  Cathryn Craig actively engaged in psychoeducational group about radical acceptance. The skill helps an individual to learn to accept situations that are out of ones control. Reducing denial of situations and reducing distress..Group discovered strategies that help them to manage emotional well being on a short term and long term basis. The introduction of the concept of \"wise mid\" is used in this process.The " "group talked about understanding the purpose and meaning radical acceptance with  \"wise mind\" ,regulation , recognition, and how it affects themselves and others..Teaching on the emphasis of radical acceptance, who the group can go to for help was brought up as well. Group was encouraged to ask questions in an open forum at the end of group. Positive progress displayed through engagement in topic, Cathryn was an active listener in the group sessions.  Cathryn Craig will continue to engage in psychotherapy to encourage positive self realization.  Treatment Plan Objective 1.1, 1.2, 1.3, 1.4 Therapist: Balaji KULKARNI Ed.   "

## 2024-10-10 NOTE — PSYCH
"Visit Time    Visit Start Time: 1045  Visit Stop Time: 1145  Total Visit Duration:  45 minutes    Subjective:     Patient ID: Cathryn Craig is a 34 y.o. female.    [facility] Clinical Progress Notes      Specialized Services Documentation  Therapist must complete separate progress note for each specific clinical activity in which the individual participated during the day.     Allied Therapy [id number] This group was facilitated virtually in a private office using HIPAA Compliant and Approved [employer] Teams. Cathryn Craig participated in music therapy group on destructive vs. healthy behaviors. The group began with a cj discussion on the song \"Hurt\" and the concept of self-destructive actions. The group then engaged in a discussion on self-destructive actions in their lives and where they hoped to grow. The group then read and discussed the handout Building New Habits and discussed how they could implement the concepts in to building healthy habits in their lives. The group then participated in a cj replacement for the song \"Flowers\" identifying healthy habits they could use to support themselves. Cathryn was on and off camera and appeared preoccupied. Minimal effort noted towards treatment goal. Continue AT to encourage the formation of healthy habits.   Tx Plan Objective: 1.1,1.2,1.4, Therapist:  Adam Patel, JEREMIAH, MT-BC  "

## 2024-10-10 NOTE — PSYCH
Subjective:     Patient ID: Cathryn Craig is a 34 y.o. female.    [facility] Clinical Progress Notes      Specialized Services Documentation  Therapist must complete separate progress note for each specific clinical activity in which the individual participated during the day.       Visit Time    Visit Start Time: 0930  Visit Stop Time: 1030  Total Visit Duration: 60 minutes    Group Psychotherapy Life Skills ([id number]) Cathryn Craig was minimally engaged in group focused on values based behaviors. She was observed to be in and out of the camera and distracted by someone else. She was observed to be talking to someone else in the other room and admitted that it was hard to focus given her circumstances. During the activity participants thought about each of their top three values and created a goal and action steps to take towards achieving that value-based behavior. Participants discussed one of their values and their intentions to respond to a situation. Cathryn did not state one of their top values.  . Participants shared their goal and action plan towards achieving that value-based behavior. We discussed the importance of acting and react with their values and not their emotions. Cathryn continues to make progress towards goals through verbal participation in group; to accomplish long term goals continue to utilize skills learned in programming. Continue with psychotherapy to educate and encourage use of wellness tools. Tx Plan Objective: 1.1,1.2 Therapist: JASON Hall, RAHEEL    Visit Time    Visit Start Time: 0830  Visit Stop Time: 0930  Total Visit Duration: 60 minutes    EDUCATION THERAPY    [id number]    Cathryn Craig actively shared in morning assessment and goal review. Presented as No Interest related to readiness to learn due to current living situation. Cathryn Craig  did complete goal from last treatment day identifying gaining hope, education, advocacy, responsibility, and support.  Cathryn Craig did not present with any barriers to learning. Throughout morning group, Cathryn Craig participated in mindfulness exercise and journaling exercise. Cathryn Craig made good progress toward goal. Continue education group to assess willingness to engage, assess transfer of knowledge, and participate in skill development.    Tx Plan Objective: 1.1,1.2, Therapist: RAHEEL Hall

## 2024-10-11 ENCOUNTER — DOCUMENTATION (OUTPATIENT)
Dept: PSYCHOLOGY | Facility: CLINIC | Age: 34
End: 2024-10-11

## 2024-10-11 ENCOUNTER — APPOINTMENT (OUTPATIENT)
Dept: PSYCHOLOGY | Facility: CLINIC | Age: 34
End: 2024-10-11
Payer: COMMERCIAL

## 2024-10-11 DIAGNOSIS — F43.21 ADJUSTMENT DISORDER WITH DEPRESSED MOOD: Primary | ICD-10-CM

## 2024-10-11 DIAGNOSIS — F16.91 HALLUCINOGEN USE DISORDER IN REMISSION: ICD-10-CM

## 2024-10-11 DIAGNOSIS — T14.91XA ATTEMPTED SUICIDE (HCC): ICD-10-CM

## 2024-10-11 DIAGNOSIS — F41.1 GAD (GENERALIZED ANXIETY DISORDER): ICD-10-CM

## 2024-10-11 NOTE — PROGRESS NOTES
Subjective:     Patient ID: Cathryn Craig 34 y.o. Female    [facility] Clinical Progress Notes      Specialized Services Documentation  Therapist must complete separate progress note for each specific clinical activity in which the individual participated during the day.     Case Management    ([id number]) Met with Cathryn Craig. Reviewed relapse prevention plan, aftercare plan, and medication list. Cathryn Craig shared improvement through being able to identify her triggers and utilizing coping skills. Denied SI, HI, and psychosis. Aftercare providers to receive summary.     Current suicide risk : Low     Medications changes/added/denied? No     Treatment session number: 10     Individual Case Management Visit provided today? Yes     [facility] follow up physician's orders: Proceed with discharge per Dr. Singletary

## 2024-10-11 NOTE — PROGRESS NOTES
"Assessment/Plan:         Assessment        1. Adjustment disorder with depressed mood          2. KAREN (generalized anxiety disorder)          3. Hallucinogen use disorder in remission          4. Attempted suicide (HCC)             Subjective:        Subjective  Patient ID: Cathryn Craig is a 34 y.o. female.           Subjective:        Subjective  Patient ID: Cathryn Craig is a 34 y.o. female.     [facility] Plan   AREAS OF NEED: Symptoms of adjustment disorder with depressed mood, KAREN as evidenced by lack of motivation, low self confidence, worthlessness, feeling lost, confused on how to contribute, body dysmorphia, lack of routine, feeling overwhelmed, shakiness and some situational ruminations due to relationship and financial stressors.  Date Initiated: 09/20/24     Strengths: \"Listening to others, understanding how they feel, communication and solving problems\"               LONG TERM GOAL:   Date Initiated: 09/20/24  1.0  While at [facility], I will gain new skills/techniques/education/support/insights which I can use daily to decrease my symptoms and increase my quality of life.  Target Date: 10/18/2024  Completion Date: 10/11/24 discharged        SHORT TERM OBJECTIVES:      Date Initiated: 09/20/24  1.1 I will learn and practice daily a new coping technique to improve my day-to-day functioning.   Revision Date: 10/1/24 continued  Target Date: 10/01/2024  Completion Date: 10/11/24 discharged     Date Initiated: 09/20/24  1.2 I will learn three ways to communicate verbally when experiencing my emotions and share implementation of skills daily to build upon relationships.  Revision Date: 10/1/24 continued  Target Date: 10/01/2024  Completion Date: 10/11/24 discharged     Date Initiated: 09/20/24  1.3 I will take medications as prescribed and share questions and concerns if arise.    Revision Date:10/1/24 continued  Target Date: 10/01/2024  Completion Date:  10/11/24 discharged     Date " Initiated: 09/20/24  1.4 I will identify 3 ways my supports can assist in my recovery and agree to staff/support contact as indicated.    Revision Date:10/1/24 continued  Target Date: 10/01/2024  Completion Date:10/11/24 discharged            7 DAY REVISION:   1.5 I will develop a new daily routine and apply it to my daily living.  Date Initiated:10/1/24   Revision Date:   Target Date: 10/11/24  Completion Date:10/11/24 discharged        PSYCHIATRY:  Date Initiated:  09/20/24  Medication Management and Education       Revision Date: 10/1/24 continued 10/11/24 discharged      The person(s) responsible for carrying out the plan is Dr. Saurabh Sauceda, Tre Landon, DO; Siri Pope PA-C     NURSING/SYMPTOM EDUCATION:   Date Initiated: 09/20/24  1.1, 1.2. 1.3, 1.4 This RN/Or Therapist will provide wellness/symptoms and skill education groups three to five days weekly to educate Cathryn Craig on signs and symptoms of diagnoses, skills to manage, and medication questions that will be addressed by the treatment team.    Revision date: 10/1/24 continued 10/11/24 discharged  The person(s) responsible for carrying out the plan is LAURA Garcia RN; Balaji KULKARNI Ed     PSYCHOLOGY:   Date Initiated: 09/20/24       1.1, 1.2, 1.4 Provide psychotherapy group 5 times per week to allow opportunity for Cathryn Craig  to explore stressors and ways of coping.   Revision Date: 10/1/24 continued 10/11/24 discharged  The person(s) responsible for carrying out the plan is Farideh Lopez MSW,LSW; Cherry Hendrickson MA LMT     ALLIED THERAPY:   Date Initiated: 09/20/24  1.1,1.2 Engage Cathryn Craig in AT group 5 times weekly to encourage development and use of wellness tools to decrease symptoms and promote recovery through meaningful activity.  Revision Date: 10/1/24 continued 10/11/24 discharged      The person(s) responsible for carrying out the plan is [facility]; Adam MIRANDA     CASE MANAGEMENT:   Date  Initiated: 09/20/24      1.0 This  will meet with Cathryn Craig  3-4 times weekly to assess treatment progress, discharge planning, connection to community supports and UR as indicated.  Revision Date: 10/1/24 continued 10/11/24 discharged  The person(s) responsible for carrying out the plan is Mireya SANCHEZ RN     TREATMENT REVIEW/COMMENTS:      DISCHARGE CRITERIA: Identify 3 signs of progress and complete relapse prevention plan.    DISCHARGE PLAN: Connect with identified outpatient providers.   Estimated Length of Stay: 10 treatment days              Diagnosis and Treatment Plan explained to Cathryn Lockett relates understanding diagnosis and is agreeable to Treatment Plan.            CLIENT COMMENTS / Please share your thoughts, feelings, need and/or experiences regarding your treatment plan with Staff.  Please see follow up note with comments.        Signatures can be found on [facility] Treatment plan consent form.

## 2024-10-11 NOTE — PROGRESS NOTES
[phone] Received call from Cathryn (transferred from ) regarding attempts to contact from . Informed that CM would call her back after group time. Informed that she would be a DC and she agreed that this was appropriate. Assured safety, and encouraged her to schedule follow ups.

## 2024-10-11 NOTE — PROGRESS NOTES
1252pm: CM called left another voicemail stating a called needs to be returned by 1430, if  not Cathryn Broussardjeffreylisette would be discharged AMA.

## 2024-10-11 NOTE — BH CRISIS PLAN
Client Name: Cathryn Craig       Client YOB: 1990    [facility] Safety Plan      Creation Date: 10/11/24 Update Date: 6/11/25   Created By: Ashley Costenbader       Step 1: Warning Signs:   Warning Signs   being insulated or degraded   increase anxiety            Step 2: Internal Coping Strategies:   Internal Coping Strategies   Walking away   Breathing techniques   Calling a friend            Step 3: People and social settings that provide distraction:   Name Contact Information   Mother number in cell   father number in cell   sister number in cell   aunt number in cell            Step 4: People whom I can ask for help during a crisis:     Patient did not identify any contacts: Yes      Step 5: Professionals or agencies I can contact during a crisis:      [facility]inican/Agency Name Phone Emergency Contact    988        Local Emergency Department Emergency Department Phone Emergency Department Address    [facility] [phone]         Crisis Phone Numbers:   Suicide Prevention Lifeline: Call or Text  988 Crisis Text Line: Text HOME to 924-480   Please note: Some [facility] do not have a separate number for Child/Adolescent specific crisis. If your county is not listed under Child/Adolescent, please call the adult number for your county      Adult Crisis Numbers: Child/Adolescent Crisis Numbers   [facility]: [phone] [facility]: [phone]   [facility]: [phone] [facility]: [phone]   [facility]: [phone] [place]: [phone]   [facility]: [phone] [facility]: [phone]   [facility]: [phone]   [facility]: [phone]   [facility]: [phone]   [facility]: [phone] (daytime) [phone] (after hours, weekends, holidays)      Step 6: Making the environment safer (plan for lethal means safety):   Patient did not identify any lethal methods: Yes     Optional: What is most important  to me and worth living for?   Her      [facility]irae Safety Plan. Beth Puente and Dima Mitchell. Used with permission of the authors.

## 2024-10-11 NOTE — PROGRESS NOTES
"Subjective:     Patient ID: Cathryn Craig is a 34 y.o. female.    [facility] Discharge Summary:     Admission Date: 9/20/24  Patient was referred by [facility] Inpatient  Discharge Date: 10/11/24   Was this a routine discharge? yes     Diagnosis: Axis I:   1. Adjustment disorder with depressed mood        2. KAREN (generalized anxiety disorder)        3. Attempted suicide (HCC)        4. Hallucinogen use disorder in remission             Treating Physician: Dr. Tre Landon,     Treatment Complications: Attendance compliance and living enviroment    Presenting Need:     Per Dr. Tre Landon, DO: Pre-morbid level of function and History of Present Illness:   Per Dr Saurabh Sauceda \"Cathryn Craig is a 34 y.o. female with past psychiatric history of majore depressive disorder, generalized anxiety disorder is admitted to [facility] referred by [facility].     Cathryn Craig was admitted to [facility] after an intentional overdose on 9/16/2024.  She reports at that time she was extremely stressed had an argument with her boyfriend.  She is 6 weeks postpartum, reports that her and her boyfriend began to have an argument about visiting somebody out of state and she began to feel guilty and upset.  The argument got heated, never escalated to violence although her boyfriend did leave the home.  She felt as though she was \"a bad mother\".  She began drinking, ended up drinking a 6 pack of White Claw and then overdosed on NyQuil/DayQuil.  She reported feeling remorseful afterwards, called Poison control of whom eventually recommended she come to the emergency room for evaluation.  She has been evaluated by psychiatry of whom recommended she come to [facility] hospital program for treatment.  She reports that she describes \"always experiencing some level of depression and anxiety\".  Reports that she had a tenuous childhood with parental difficulties, being raised Yazdanism and then having her parents " " at age 12.  She reports feeling frequently tense and on edge as a child, overthinking situations and having poor self-confidence.  She reports intermittently using substances, more recently over the past 10 years.  She reports using many hallucinogens 0 ketamine, mushrooms, LSD as well as stimulants with cocaine and intermittent cannabis use.  She also heavily used alcohol and has attended rehab 2 times in the past.  She reports more recently she has been struggling with poor self-esteem and being self-conscious.  Continues to struggle with overthinking and excessively worrying about situations and scenarios.  She described getting \"paralysis by analysis\".  She reports in rare situations she has had panic attacks although not too frequently.  She does not endorse many symptoms of depression, noting no significant changes in sleep or energy, no change in happiness or enjoyment in activities and some minor feelings of guilt.  She reports energy levels are a bit decreased overall due to having a child although she feels that this is manageable.  She does report poor difficulties with concentration and focus, describing that she chronically is struggled with this throughout her life and feels that she has always had ADHD.  She denies any suicidal or homicidal ideations.  Denies any auditory or visual hallucinations.  Denies any obsessions, compulsions, delusions.  Denies any manic or hypomanic symptoms.  Reports some instances of binge eating in the past although no compensatory mechanisms and no clear evidence of it being pathologic.    Per this writer Cathryn Craig is a 34 year old female who was referred to [facility] by inpatient acute care due to attempted suicide by overdose. Cathryn Broussardjeffreylisette states she lives with her boyfriend, son and stepson. Cathryn Broussardjeffreylisette stated she has been struggling with lack of motivation, low self confidence, worthlessness, feeling lost, confused on how to " "contribute, body dysmorphia, lack of routine, feeling overwhelmed, shakiness and some situational ruminations. Denies suicidal ideations, denies homicidal ideations, denies thoughts of self injurious behaviors. Cathryn reports her stressors as relationships with partner and mother- communication could be better, financial-has a lot of debt, and no drivers license. Cathryn shared what led to the hospitalization was an argument with her boyfriend and she began to drink a 6 pack of [place], was feeling worthless and \"not a good mother or partner\" and drank Nyquil/DayQuil  as an impulse. States she has remorse from this and has reasons for living which include her son. Reports every time she fights with her boyfriend is when she drinks alcohol and knows she needs to stop. Denies auditory and visual hallucinations. Reports she feels she has struggled with anxiety and depression most her life but has never received treatment. Cathryn would like to gain coping mechanisms, develop a routine, learn about self/communication/responsibility. PHQ-9 on intake was 6.     Per Cathryn Rafa \"I have low self confidence and can feel worthless, lost, confused on how to contribute.\"       Course of treatment includes:    group counseling, medication management, individual case management, allied therapy, psychoeducation, and psychiatric evaluation    Treatment Progress: Cathryn Rebeccalisette attended 10 days in PHP in which Cathryn challenged negative thoughts, engaging in healthy coping strategies and learned ways to manage her depression, anxiety, . During their time in PHP, Cathryn was an active participant in program and in individual work.  and Cathryn addressed the following treatment objectives in case management: epressed mood, KAREN , motivation, low self confidence, worthlessness, feeling lost, confused on how to contribute, body dysmorphia, lack of routine, feeling overwhelmed . Their response to treatment was fair as " evidence by lack of attendance and occupied with new born baby. Throughout their time in PHP Cathryn Craig had groups on skills that supported the four pillars of DBT: Mindfulness, Distress Tolerance, Interpersonal Effectiveness, and Emotional Regulation.  Cathryn struggled to work on suggestions and practiced coping skills. They presented as interested to learn, be open to change, demonstrated a willingness to be challenged, and improve upon their insight/judgment. Cathryn also attended medication reviews. Denied SI, HI, and psychosis. Aftercare providers to receive summary.      Aftercare recommendations include:     Due to .Cathryn Craig  moving to a different [facility], it was recommended that.name would need to change her insurance to that county and request a providers list. Cathryn Craig was encouraged to call PCP for me refills till she finds providers.   Medication management:  Denies having a medication manager.     Therapist:  Denies having a therapist       Discharge Medications include:  Current Outpatient Medications:     amoxicillin (AMOXIL) 500 mg capsule, , Disp: , Rfl:     fish oil-omega-3 fatty acids 1000 MG capsule, Take 2 g by mouth daily, Disp: , Rfl:     MAGNESIUM PO, Take by mouth, Disp: , Rfl:     Prenatal Multivit-Min-Fe-FA (PRE-ADEEL PO), Take by mouth, Disp: , Rfl:     sertraline (Zoloft) 50 mg tablet, Take 1 tablet (50 mg total) by mouth daily, Disp: 30 tablet, Rfl: 1

## 2024-10-11 NOTE — PSYCH
Visit Time    Visit Start Time: 0830  Visit Stop Time: 0930  Total Visit Duration: {Psych Total Visit Time:73631}    Subjective:     Patient ID: Cathryn Craig is a 34 y.o. female.    [facility] Clinical Progress Notes      Specialized Services Documentation  Therapist must complete separate progress note for each specific clinical activity in which the individual participated during the day.       EDUCATION THERAPY      Cathryn Craig {SL AMB PSYCH ACTIVELY:17557} shared in morning assessment and goal review. Presented as {Readiness to Learnin} related to readiness to learn. Cathryn Craig  {DID/DID NOT:26398} complete goal from last treatment day identifying gaining {HEARS:99786}. Cathryn Craig {DID/DID NOT:17774} present with any barriers to learning. Throughout morning group, Cathryn Craig participated in {morning assessmemt experiences:14644}. Cathryn Craig made *** progress toward goal. Continue education group to assess willingness to engage, assess transfer of knowledge, and participate in skill development.    Tx Plan Objective: 1.1, 1.2, 1.3, 1.4 , Therapist: Balaji Christine    Visit Time    Visit Start Time: 1330  Visit Stop Time: 1430  Total Visit Duration: {Psych Total Visit Time:49735}    Subjective:     Patient ID: Cathryn Craig is a 34 y.o. female.    [facility] Clinical Progress Notes      Specialized Services Documentation  Therapist must complete separate progress note for each specific clinical activity in which the individual participated during the day.       GROUP PSYCHOTHERAPY    Cathryn Craig participated {SL AMB PSYCH ACTIVELY:13077} in psychotherapy group.  This was observed {consistent/inconsistent pain:5936597821} throughout the treatment day. They were engaged in learning related to {Education Completed:83679}. Staff utilized {Teaching Method:44789} teaching methods.  Cathryn Craig shared area of learning and set a goal for outside of program to ***.  Cathryn  Rafa was able to share items explored during the day and shared in adding to their WRAP.  Ended session with {staff/peer led:00297} led exercise ***.  Cathryn Craig demonstrated *** progress toward goal.  Continue group psychotherapy to actively practice learned skills and encourage transfer of knowledge to unstructured time.    Tx Plan Objective: 1.1, 1.2, 1.3, 1.4 , Therapist: Balaji Christine    Group Psychotherapy      Visit Start Time: (930)  Visit Stop Time: (1030)  Total Visit Duration: {Psych Total Visit Time:34626}    Subjective:     Patient ID: Cathryn Craig 34 y.o.    This group was facilitated virtually in a private office using HIPAA Compliant and Approved [employer] Teams.  Cathryn Craig actively engaged in psychoeducational group about motivation. The skill helps to explore purpose of motivation and the limiting beliefs that hold back motivation The group discovered strategies that help them to develop motivation and the potential barriers on a short term and long term basis. The group talked about understanding the purpose, and meaning of motivation in intellectual and emotional expression, regulation , and recognition, and how it affects themselves and others. Teaching on the emphasis of self monitoring , suggestive solutions, verbal and non-verbal communication, keeping commitments, and strategies were discusses to reduce limited motivation.  Group was encouraged to ask questions in an open forum at the end of group. *** progress displayed through engagement in topic. Cathryn Craig will continue to engage in psychotherapy to encourage positive self realization.  Treatment Plan Objective 1.1, 1.2, 1.3, 1.4 Therapist: Balaji KULKARNI Ed.

## 2024-10-11 NOTE — PROGRESS NOTES
Cathryn Craig is an no call no show. CM attempted to call but phone was shut off and voicemail was not step. HILARIA sent Cathryn Craig an email.   Ashley Costenbader MA LMT

## 2024-10-11 NOTE — PROGRESS NOTES
1244pm: HILARIA called Cathryn Craig phone and was able to reach her voicemail and leave a message for Cathryn Craig to return call.

## 2024-10-11 NOTE — PROGRESS NOTES
1054am: CM called Cathryn Craig emergency contact in epic due to not being able to reach Cathryn Craig. CM was able to speak with mother and requested mother to get in contact with Cathryn Craig to make sure her safety was ok. Mother reported she would call the sister who Cathryn Craig currently living with to have Cathryn Craig to CM back.     Ashley Costenbader MA LMT

## 2024-11-06 ENCOUNTER — TELEPHONE (OUTPATIENT)
Age: 34
End: 2024-11-06

## 2024-11-06 NOTE — TELEPHONE ENCOUNTER
Contacted patient off of [facility]  wait list to verify needs of services in attempts to update list with patient preferences and to discuss insurance denial. spoke with patient whom stated they were still interested in TT services. Writer made pt aware of insurance denial and gave names of other [place] companies that [facility] does accept. Pt requested to remain on wait list for now until new year starts to get insurance switched around.

## 2024-11-18 DIAGNOSIS — Z00.6 ENCOUNTER FOR EXAMINATION FOR NORMAL COMPARISON OR CONTROL IN CLINICAL RESEARCH PROGRAM: ICD-10-CM
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
## 2024-12-23 ENCOUNTER — ANNUAL EXAM (OUTPATIENT)
Dept: OBGYN CLINIC | Facility: CLINIC | Age: 34
End: 2024-12-23
Payer: COMMERCIAL

## 2024-12-23 VITALS — SYSTOLIC BLOOD PRESSURE: 108 MMHG | WEIGHT: 173 LBS | DIASTOLIC BLOOD PRESSURE: 68 MMHG | BODY MASS INDEX: 27.1 KG/M2

## 2024-12-23 DIAGNOSIS — Z01.411 ENCOUNTER FOR GYNECOLOGICAL EXAMINATION (GENERAL) (ROUTINE) WITH ABNORMAL FINDINGS: Primary | ICD-10-CM

## 2024-12-23 DIAGNOSIS — Z91.89 AT INCREASED RISK FOR INTIMATE PARTNER VIOLENCE: ICD-10-CM

## 2024-12-23 DIAGNOSIS — Z71.85 HPV VACCINE COUNSELING: ICD-10-CM

## 2024-12-23 DIAGNOSIS — Z11.3 SCREENING FOR STD (SEXUALLY TRANSMITTED DISEASE): ICD-10-CM

## 2024-12-23 DIAGNOSIS — Z30.09 BIRTH CONTROL COUNSELING: ICD-10-CM

## 2024-12-23 PROBLEM — O99.310: Status: RESOLVED | Noted: 2024-07-02 | Resolved: 2024-12-23

## 2024-12-23 PROBLEM — Z3A.40 40 WEEKS GESTATION OF PREGNANCY: Status: RESOLVED | Noted: 2024-07-02 | Resolved: 2024-12-23

## 2024-12-23 PROBLEM — O99.320 DRUG USE AFFECTING PREGNANCY, ANTEPARTUM: Status: RESOLVED | Noted: 2024-07-02 | Resolved: 2024-12-23

## 2024-12-23 PROBLEM — Z34.90 ENCOUNTER FOR INDUCTION OF LABOR: Chronic | Status: RESOLVED | Noted: 2024-08-08 | Resolved: 2024-12-23

## 2024-12-23 PROBLEM — O41.00X0 OLIGOHYDRAMNIOS: Status: RESOLVED | Noted: 2024-08-11 | Resolved: 2024-12-23

## 2024-12-23 PROCEDURE — 87591 N.GONORRHOEAE DNA AMP PROB: CPT | Performed by: PHYSICIAN ASSISTANT

## 2024-12-23 PROCEDURE — G0145 SCR C/V CYTO,THINLAYER,RESCR: HCPCS | Performed by: PHYSICIAN ASSISTANT

## 2024-12-23 PROCEDURE — 87491 CHLMYD TRACH DNA AMP PROBE: CPT | Performed by: PHYSICIAN ASSISTANT

## 2024-12-23 PROCEDURE — 99395 PREV VISIT EST AGE 18-39: CPT | Performed by: PHYSICIAN ASSISTANT

## 2024-12-23 PROCEDURE — G0476 HPV COMBO ASSAY CA SCREEN: HCPCS | Performed by: PHYSICIAN ASSISTANT

## 2024-12-23 PROCEDURE — 99213 OFFICE O/P EST LOW 20 MIN: CPT | Performed by: PHYSICIAN ASSISTANT

## 2024-12-26 LAB
HPV HR 12 DNA CVX QL NAA+PROBE: NEGATIVE
HPV16 DNA CVX QL NAA+PROBE: NEGATIVE
HPV18 DNA CVX QL NAA+PROBE: NEGATIVE

## 2024-12-27 ENCOUNTER — RESULTS FOLLOW-UP (OUTPATIENT)
Dept: OBGYN CLINIC | Facility: CLINIC | Age: 34
End: 2024-12-27

## 2024-12-27 LAB
C TRACH DNA SPEC QL NAA+PROBE: NEGATIVE
N GONORRHOEA DNA SPEC QL NAA+PROBE: NEGATIVE

## 2024-12-30 LAB
LAB AP GYN PRIMARY INTERPRETATION: NORMAL
LAB AP LMP: NORMAL
Lab: NORMAL

## 2025-01-10 ENCOUNTER — TELEPHONE (OUTPATIENT)
Age: 35
End: 2025-01-10

## 2025-01-10 NOTE — TELEPHONE ENCOUNTER
Outreach call placed in an attempt to schedule pt from Talk Therapy wait list. Pt declines TT services with [facility] as she has found services elsewhere. However, pt inquired about MM services. Writer informed pt that there is no availability at this time, pt will be added to wait list.     Patient has been added to the Medication Management wait list without a referral.    Insurance: [facility]   Insurance Type:    Commercial []   Medicaid [x]   [facility] (if applicable) - [place]  Promise verified  pt states MA provider is changing as of 2/1/25    Were outside resources sent: Yes [] No [x]      Removed from Therapy wait list.

## 2025-04-10 ENCOUNTER — APPOINTMENT (OUTPATIENT)
Dept: LAB | Facility: HOSPITAL | Age: 35
End: 2025-04-10
Payer: MEDICARE

## 2025-04-10 ENCOUNTER — APPOINTMENT (OUTPATIENT)
Dept: LAB | Facility: HOSPITAL | Age: 35
End: 2025-04-10
Attending: PATHOLOGY

## 2025-04-10 DIAGNOSIS — Z00.6 ENCOUNTER FOR EXAMINATION FOR NORMAL COMPARISON OR CONTROL IN CLINICAL RESEARCH PROGRAM: ICD-10-CM

## 2025-04-10 DIAGNOSIS — Z11.3 SCREENING FOR STD (SEXUALLY TRANSMITTED DISEASE): ICD-10-CM

## 2025-04-10 PROCEDURE — 86803 HEPATITIS C AB TEST: CPT

## 2025-04-10 PROCEDURE — 87389 HIV-1 AG W/HIV-1&-2 AB AG IA: CPT

## 2025-04-10 PROCEDURE — 86780 TREPONEMA PALLIDUM: CPT

## 2025-04-10 PROCEDURE — 36415 COLL VENOUS BLD VENIPUNCTURE: CPT

## 2025-04-10 PROCEDURE — 87340 HEPATITIS B SURFACE AG IA: CPT

## 2025-04-11 LAB
HBV SURFACE AG SER QL: NORMAL
HCV AB SER QL: NORMAL
HIV 1+2 AB+HIV1 P24 AG SERPL QL IA: NORMAL
TREPONEMA PALLIDUM IGG+IGM AB [PRESENCE] IN SERUM OR PLASMA BY IMMUNOASSAY: NORMAL

## 2025-04-21 ENCOUNTER — TELEPHONE (OUTPATIENT)
Age: 35
End: 2025-04-21

## 2025-04-21 NOTE — TELEPHONE ENCOUNTER
Placed call to patient to notify her of results, no answer, left a non detailed voice message to call back with phone number provided [phone]

## 2025-04-22 LAB
APOB+LDLR+PCSK9 GENE MUT ANL BLD/T: NOT DETECTED
BRCA1+BRCA2 DEL+DUP + FULL MUT ANL BLD/T: NOT DETECTED
MLH1+MSH2+MSH6+PMS2 GN DEL+DUP+FUL M: NOT DETECTED

## 2025-06-02 ENCOUNTER — OFFICE VISIT (OUTPATIENT)
Age: 35
End: 2025-06-02
Payer: MEDICARE

## 2025-06-02 VITALS — WEIGHT: 173 LBS | HEIGHT: 67 IN | BODY MASS INDEX: 27.15 KG/M2

## 2025-06-02 DIAGNOSIS — M99.04 SEGMENTAL DYSFUNCTION OF SACRAL REGION: Primary | ICD-10-CM

## 2025-06-02 DIAGNOSIS — M99.03 SEGMENTAL DYSFUNCTION OF LUMBAR REGION: ICD-10-CM

## 2025-06-02 DIAGNOSIS — M99.02 SEGMENTAL DYSFUNCTION OF THORACIC REGION: ICD-10-CM

## 2025-06-02 DIAGNOSIS — M62.838 MUSCLE SPASM: ICD-10-CM

## 2025-06-02 DIAGNOSIS — M99.01 SEGMENTAL DYSFUNCTION OF CERVICAL REGION: ICD-10-CM

## 2025-06-02 PROCEDURE — 99203 OFFICE O/P NEW LOW 30 MIN: CPT | Performed by: CHIROPRACTOR

## 2025-06-02 PROCEDURE — 98941 CHIROPRACT MANJ 3-4 REGIONS: CPT | Performed by: CHIROPRACTOR

## 2025-06-02 NOTE — PROGRESS NOTES
Initial date of service: 6/2/25    Diagnoses and all orders for this visit:    Segmental dysfunction of sacral region    Muscle spasm    Segmental dysfunction of lumbar region    Segmental dysfunction of thoracic region    Segmental dysfunction of cervical region    No red flags, radiculopathy or neurologic deficit appreciated clinically. Pt's symptoms and exam findings consistent with mechanical neck/back pain secondary to repetitive st/sp injury, exacerbated by ligament laxity as a result of adjusting her own joints frequently.  Reviewed proper neck and back ergonomics as well as a home stretching and exercise program to help better address her myofascial joint dysfunction with eventual stabilization to prevent re-injury. Discussed risks of continuing to adjust her own joints frequently.   Spent greater than 30 min c pt discussing hx, pe, ddx, tx options and reviewing notes/imaging today    Return if symptoms worsen or fail to improve.    TREATMENT: 80017  Fear avoidance behavior discussion, encouraged and reassured pt that natural course of condition is to improve over time with adherence to tx plan and home care strategies. Home care recommendations: avoid bed rest, walk (but avoid trails and uneven surfaces), gradual return to activity to tolerance (avoid anything that peripheralizes symptoms), ice 20 min on/off, watch for ice burn, call if symptoms peripheralize, worsen, or neurologic deficit progresses. Therapeutic Procedures: IASTM - discussed post procedure soreness and/or ecchymosis for up to 36 hrs, applied to affected mm hypertonicities; wall landon, axial retraction, upper trap stretch, lev scap stretch, transitional mvmt education, abdominal bracing;. Thoracic mobilization/manipulation: prone P-A mob, supine A-P manip; cervical mobilization/manipulation: traction, diversified supine graded mobilization; lumbar flexion-traction; R SIJ nydia drop table manuever    HPI:  Cathryn Craig is a 35 y.o.  "female   Chief Complaint   Patient presents with    Neck - Follow-up     Neck pain that is intermittent .    Pain score 0-1        Back Pain     Lower lumbar pain that radiates into left hip area.  Patient states tight . Pain score 0-1        Pt presents for eval and tx for neck and back pain she attributes to \"popping\" her neck and back frequently. Pt reports she was leaning over the bedframe late May 2025 to pop her back when she felt it compressed a nerve; was bothersome for a day or two, but better since. Pt has baby boy born 2024. Pt reports she has seen chiro in past with limited improvement since she adjusts herself    Back Pain  This is a recurrent problem. The problem has been rapidly improving since onset. The pain is present in the lumbar spine, thoracic spine and sacro-iliac. The quality of the pain is described as aching. The pain does not radiate. The symptoms are aggravated by bending, standing and twisting. Pertinent negatives include no bowel incontinence, numbness or weakness.     Past Medical History[1]   The following portions of the patient's history were reviewed and updated as appropriate: allergies, past family history, past medical history, past social history, past surgical history, and problem list.  Review of Systems   Gastrointestinal:  Negative for bowel incontinence.   Musculoskeletal:  Positive for back pain.   Neurological:  Negative for weakness and numbness.     Physical Exam    Eyes:      Extraocular Movements: Extraocular movements intact.       Cardiovascular:      Pulses: Normal pulses.   Abdominal:      General: There is no distension.      Tenderness: There is no abdominal tenderness.     Musculoskeletal:      Cervical back: Pain with movement and muscular tenderness present. Decreased range of motion.      Thoracic back: Spasms and tenderness present. Decreased range of motion.      Lumbar back: Spasms and tenderness present. Decreased range of motion. Negative right " straight leg raise test and negative left straight leg raise test.        Back:    Lymphadenopathy:      Cervical: No cervical adenopathy.     Skin:     General: Skin is warm and dry.     Neurological:      Mental Status: She is alert and oriented to person, place, and time.      Sensory: Sensation is intact.      Motor: Motor function is intact.      Coordination: Coordination is intact.      Gait: Gait is intact.      Deep Tendon Reflexes: Reflexes normal.     Psychiatric:         Mood and Affect: Mood and affect normal.         Behavior: Behavior normal.     SOFT TISSUE ASSESSMENT: Hypertonicity and tenderness palpated B C5-T6, L4-S1 erector spinae, upper traps, lev scap, suboccipitals JOINT RECTRICTIONS: C5-T6, L4-S1 and R SIJ ORTHO: Paula unremarkable for centralization/peripheralization; max foraminal comp elicits local np R; shoulder depression elicits stiffness in R/L upper trap; brachial plexus tension test elicits no neural tension in R/L UE; cervical distraction unremarkable; sitting root neg B          [1]   Past Medical History:  Diagnosis Date    Abnormal Pap smear of cervix     last pap 1/2024    Depression     no meds    Varicella     Vitiligo

## 2025-06-30 ENCOUNTER — PROCEDURE VISIT (OUTPATIENT)
Age: 35
End: 2025-06-30
Payer: MEDICARE

## 2025-06-30 ENCOUNTER — OFFICE VISIT (OUTPATIENT)
Dept: FAMILY MEDICINE CLINIC | Facility: CLINIC | Age: 35
End: 2025-06-30
Payer: MEDICARE

## 2025-06-30 VITALS
SYSTOLIC BLOOD PRESSURE: 110 MMHG | BODY MASS INDEX: 25.9 KG/M2 | HEART RATE: 92 BPM | DIASTOLIC BLOOD PRESSURE: 70 MMHG | OXYGEN SATURATION: 97 % | HEIGHT: 67 IN | RESPIRATION RATE: 16 BRPM | WEIGHT: 165 LBS

## 2025-06-30 VITALS — WEIGHT: 165 LBS | BODY MASS INDEX: 25.9 KG/M2 | HEIGHT: 67 IN

## 2025-06-30 DIAGNOSIS — M99.03 SEGMENTAL DYSFUNCTION OF LUMBAR REGION: ICD-10-CM

## 2025-06-30 DIAGNOSIS — Z13.1 SCREENING FOR DIABETES MELLITUS: ICD-10-CM

## 2025-06-30 DIAGNOSIS — M99.04 SEGMENTAL DYSFUNCTION OF SACRAL REGION: Primary | ICD-10-CM

## 2025-06-30 DIAGNOSIS — M62.838 MUSCLE SPASM: ICD-10-CM

## 2025-06-30 DIAGNOSIS — M79.671 PAIN IN BOTH FEET: ICD-10-CM

## 2025-06-30 DIAGNOSIS — M79.672 PAIN IN BOTH FEET: ICD-10-CM

## 2025-06-30 DIAGNOSIS — Z13.6 SCREENING FOR CARDIOVASCULAR CONDITION: ICD-10-CM

## 2025-06-30 DIAGNOSIS — F41.1 GAD (GENERALIZED ANXIETY DISORDER): ICD-10-CM

## 2025-06-30 DIAGNOSIS — Z00.00 PREVENTATIVE HEALTH CARE: ICD-10-CM

## 2025-06-30 DIAGNOSIS — Z00.00 ENCOUNTER FOR ANNUAL PHYSICAL EXAM: Primary | ICD-10-CM

## 2025-06-30 DIAGNOSIS — M99.01 SEGMENTAL DYSFUNCTION OF CERVICAL REGION: ICD-10-CM

## 2025-06-30 DIAGNOSIS — M99.02 SEGMENTAL DYSFUNCTION OF THORACIC REGION: ICD-10-CM

## 2025-06-30 DIAGNOSIS — Z01.00 ENCOUNTER FOR VISION SCREENING: ICD-10-CM

## 2025-06-30 PROBLEM — R30.0 DYSURIA: Status: RESOLVED | Noted: 2024-08-08 | Resolved: 2025-06-30

## 2025-06-30 PROCEDURE — 98941 CHIROPRACT MANJ 3-4 REGIONS: CPT | Performed by: CHIROPRACTOR

## 2025-06-30 PROCEDURE — 99385 PREV VISIT NEW AGE 18-39: CPT | Performed by: FAMILY MEDICINE

## 2025-06-30 NOTE — ASSESSMENT & PLAN NOTE
Patient has stressful home situation but doesn't want medication due to breast feeding. Patient trying to get in with therapist.

## 2025-06-30 NOTE — PROGRESS NOTES
Name: Cathryn Craig      : 1990      MRN: [id number]  Encounter Provider: Robby Peña MD  Encounter Date: 2025   Encounter department: [facility]  :  Assessment & Plan  Encounter for annual physical exam  CPE done. Last Tdap was in 2019. Will check lipids. Last Pap was in 2024. Pt advised to follow a well balanced, heart healthy diet and to exercise on a regular basis. Patient advised to stop vaping. Blood pressure good.   Orders:  •  Lipid panel; Future  •  Comprehensive metabolic panel; Future  •  CBC and differential; Future    Screening for cardiovascular condition    Orders:  •  Lipid panel; Future    Screening for diabetes mellitus    Orders:  •  Comprehensive metabolic panel; Future    Preventative health care    Orders:  •  Lipid panel; Future  •  Comprehensive metabolic panel; Future  •  CBC and differential; Future    Pain in both feet  Patient has had pain for past few years and worse recently. Will refer to Podiatry.   Orders:  •  Ambulatory Referral to Podiatry; Future    KAREN (generalized anxiety disorder)  Patient has stressful home situation but doesn't want medication due to breast feeding. Patient trying to get in with therapist.        Encounter for vision screening                History of Present Illness   Patient here for new patient visit. Patient doing well. No chest pain or shortness of breath. No headaches. No abdominal pain. Last Tdap was in 2019. Patient has anxiety issues at home. Was on medication in the past. Trying to get in with therapist.       Review of Systems   Constitutional:  Negative for activity change, appetite change, fatigue and unexpected weight change.   HENT:  Negative for congestion and sore throat.    Eyes:  Negative for visual disturbance.   Respiratory:  Negative for cough, chest tightness and shortness of breath.    Cardiovascular:  Negative for chest pain, palpitations and leg swelling.   Gastrointestinal:   "Negative for abdominal pain, constipation, diarrhea, nausea and vomiting.   Genitourinary:  Negative for difficulty urinating, dysuria, frequency and hematuria.   Musculoskeletal:  Positive for arthralgias. Negative for back pain, gait problem and myalgias.   Skin:  Negative for color change, rash and wound.   Neurological:  Negative for dizziness, weakness, light-headedness, numbness and headaches.   Hematological:  Negative for adenopathy. Does not bruise/bleed easily.   Psychiatric/Behavioral:  Negative for dysphoric mood and sleep disturbance.        Objective   /70 (BP Location: Left arm, Patient Position: Sitting, Cuff Size: Standard)   Pulse 92   Resp 16   Ht 5' 7\" (1.702 m)   Wt 74.8 kg (165 lb)   SpO2 97%   BMI 25.84 kg/m²      Physical Exam  Vitals and nursing note reviewed.   Constitutional:       General: She is not in acute distress.     Appearance: Normal appearance. She is well-developed. She is not ill-appearing.   HENT:      Head: Normocephalic and atraumatic.      Right Ear: Tympanic membrane, ear canal and external ear normal.      Left Ear: Tympanic membrane, ear canal and external ear normal.      Nose: Nose normal. No congestion or rhinorrhea.      Mouth/Throat:      Mouth: Mucous membranes are moist.      Pharynx: Oropharynx is clear. No posterior oropharyngeal erythema.     Eyes:      Extraocular Movements: Extraocular movements intact.      Conjunctiva/sclera: Conjunctivae normal.      Pupils: Pupils are equal, round, and reactive to light.     Neck:      Thyroid: No thyromegaly.     Cardiovascular:      Rate and Rhythm: Normal rate and regular rhythm.      Heart sounds: Normal heart sounds. No murmur heard.  Pulmonary:      Effort: Pulmonary effort is normal.      Breath sounds: Normal breath sounds. No wheezing or rhonchi.   Abdominal:      General: Abdomen is flat. Bowel sounds are normal.      Palpations: Abdomen is soft.      Tenderness: There is no abdominal tenderness. "     Musculoskeletal:         General: Normal range of motion.      Cervical back: Normal range of motion and neck supple.   Lymphadenopathy:      Cervical: No cervical adenopathy.     Skin:     General: Skin is warm and dry.      Capillary Refill: Capillary refill takes less than 2 seconds.      Findings: No rash.     Neurological:      Mental Status: She is alert and oriented to person, place, and time.      Cranial Nerves: No cranial nerve deficit.      Sensory: No sensory deficit.     Psychiatric:         Behavior: Behavior normal.         Thought Content: Thought content normal.         Judgment: Judgment normal.

## 2025-06-30 NOTE — ASSESSMENT & PLAN NOTE
Patient has had pain for past few years and worse recently. Will refer to Podiatry.   Orders:  •  Ambulatory Referral to Podiatry; Future

## 2025-06-30 NOTE — ASSESSMENT & PLAN NOTE
CPE done. Last Tdap was in 2019. Will check lipids. Last Pap was in December 2024. Pt advised to follow a well balanced, heart healthy diet and to exercise on a regular basis. Patient advised to stop vaping. Blood pressure good.   Orders:  •  Lipid panel; Future  •  Comprehensive metabolic panel; Future  •  CBC and differential; Future

## 2025-06-30 NOTE — PROGRESS NOTES
"Initial date of service: 6/2/25    Diagnoses and all orders for this visit:    Segmental dysfunction of sacral region    Muscle spasm    Segmental dysfunction of lumbar region    Segmental dysfunction of thoracic region    Segmental dysfunction of cervical region    Mechanical neck/back pain secondary to repetitive st/sp injury, exacerbated by ligament laxity as a result of adjusting her own joints frequently. Reviewed home exercises to stabilize; linked shoulder strenghtening exercises for shoulder instability; will refer to orhto if symptoms persist    Return if symptoms worsen or fail to improve.    TREATMENT: [id number]  Therapeutic Procedures: IASTM - discussed post procedure soreness and/or ecchymosis for up to 36 hrs, applied to affected mm hypertonicities; wall landon, axial retraction, upper trap stretch, lev scap stretch, transitional mvmt education, abdominal bracing;. Thoracic mobilization/manipulation: prone P-A mob, supine A-P manip; cervical mobilization/manipulation: traction, diversified supine graded mobilization; lumbar flexion-traction; R SIJ stafford drop table manuever    HPI:  Cathryn Craig is a 35 y.o. female   Chief Complaint   Patient presents with    Neck Pain     Neck and thoracic tight about a 3 bilateral     Arm Pain     States she dislocated her left arm 3 weeks ago      Pt presents for tx for neck and back pain she attributes to \"popping\" her neck and back frequently. Pt reports she was leaning over the bedframe late May 2025 to pop her back when she felt it compressed a nerve; was bothersome for a day or two, but better since. Pt has baby boy born 2024. Pt reports she has seen chiro in past with limited improvement since she adjusts herself  6/30: pt reports feeling better since last tx; symptoms flared this weekend; L shoulder felt like it popped in and out    Back Pain  This is a recurrent problem. The problem has been rapidly improving since onset. The pain is present in the lumbar " spine, thoracic spine and sacro-iliac. The quality of the pain is described as aching. The pain does not radiate. The symptoms are aggravated by bending, standing and twisting. Pertinent negatives include no bowel incontinence, numbness or weakness.   Neck Pain   Pertinent negatives include no numbness or weakness.   Arm Pain   Pertinent negatives include no numbness.     Past Medical History[1]   The following portions of the patient's history were reviewed and updated as appropriate: allergies, past family history, past medical history, past social history, past surgical history, and problem list.  Review of Systems   Gastrointestinal:  Negative for bowel incontinence.   Musculoskeletal:  Positive for back pain and neck pain.   Neurological:  Negative for weakness and numbness.     Physical Exam    Eyes:      Extraocular Movements: Extraocular movements intact.       Cardiovascular:      Pulses: Normal pulses.   Abdominal:      General: There is no distension.      Tenderness: There is no abdominal tenderness.     Musculoskeletal:      Cervical back: Pain with movement and muscular tenderness present. Decreased range of motion.      Thoracic back: Spasms and tenderness present. Decreased range of motion.      Lumbar back: Spasms and tenderness present. Decreased range of motion. Negative right straight leg raise test and negative left straight leg raise test.        Back:    Lymphadenopathy:      Cervical: No cervical adenopathy.     Skin:     General: Skin is warm and dry.     Neurological:      Mental Status: She is alert and oriented to person, place, and time.      Sensory: Sensation is intact.      Motor: Motor function is intact.      Coordination: Coordination is intact.      Gait: Gait is intact.      Deep Tendon Reflexes: Reflexes normal.     Psychiatric:         Mood and Affect: Mood and affect normal.         Behavior: Behavior normal.     SOFT TISSUE ASSESSMENT: Hypertonicity and tenderness palpated B  C5-T6, L4-S1 erector spinae, upper traps, lev scap, suboccipitals JOINT RECTRICTIONS: C5-T6, L4-S1 and R SIJ           [1]   Past Medical History:  Diagnosis Date    Abnormal Pap smear of cervix     last pap 1/2024    Depression     no meds    Varicella     Vitiligo

## 2025-07-07 ENCOUNTER — TELEPHONE (OUTPATIENT)
Age: 35
End: 2025-07-07

## 2025-07-07 NOTE — TELEPHONE ENCOUNTER
Caller: Cathryn Craig    Doctor:  ?    Reason for call: appt/checked chart for FX notes/pt says she has fxs in feet/nothing/scheduled    Call back#: NA

## 2025-07-16 ENCOUNTER — PROCEDURE VISIT (OUTPATIENT)
Age: 35
End: 2025-07-16
Payer: MEDICARE

## 2025-07-16 ENCOUNTER — APPOINTMENT (OUTPATIENT)
Dept: LAB | Facility: AMBULARY SURGERY CENTER | Age: 35
End: 2025-07-16
Payer: MEDICARE

## 2025-07-16 VITALS
HEART RATE: 68 BPM | HEIGHT: 67 IN | BODY MASS INDEX: 25.9 KG/M2 | SYSTOLIC BLOOD PRESSURE: 102 MMHG | WEIGHT: 165 LBS | DIASTOLIC BLOOD PRESSURE: 60 MMHG

## 2025-07-16 DIAGNOSIS — M62.838 MUSCLE SPASM: ICD-10-CM

## 2025-07-16 DIAGNOSIS — M99.01 SEGMENTAL DYSFUNCTION OF CERVICAL REGION: ICD-10-CM

## 2025-07-16 DIAGNOSIS — Z13.1 SCREENING FOR DIABETES MELLITUS: ICD-10-CM

## 2025-07-16 DIAGNOSIS — Z00.00 PREVENTATIVE HEALTH CARE: ICD-10-CM

## 2025-07-16 DIAGNOSIS — Z13.6 SCREENING FOR CARDIOVASCULAR CONDITION: ICD-10-CM

## 2025-07-16 DIAGNOSIS — M99.02 SEGMENTAL DYSFUNCTION OF THORACIC REGION: ICD-10-CM

## 2025-07-16 DIAGNOSIS — M99.04 SEGMENTAL DYSFUNCTION OF SACRAL REGION: Primary | ICD-10-CM

## 2025-07-16 DIAGNOSIS — M99.03 SEGMENTAL DYSFUNCTION OF LUMBAR REGION: ICD-10-CM

## 2025-07-16 DIAGNOSIS — Z00.00 ENCOUNTER FOR ANNUAL PHYSICAL EXAM: ICD-10-CM

## 2025-07-16 LAB
ALBUMIN SERPL BCG-MCNC: 3.8 G/DL (ref 3.5–5)
ALP SERPL-CCNC: 67 U/L (ref 34–104)
ALT SERPL W P-5'-P-CCNC: 12 U/L (ref 7–52)
ANION GAP SERPL CALCULATED.3IONS-SCNC: 9 MMOL/L (ref 4–13)
AST SERPL W P-5'-P-CCNC: 17 U/L (ref 13–39)
BASOPHILS # BLD AUTO: 0.03 THOUSANDS/ÂΜL (ref 0–0.1)
BASOPHILS NFR BLD AUTO: 0 % (ref 0–1)
BILIRUB SERPL-MCNC: 0.34 MG/DL (ref 0.2–1)
BUN SERPL-MCNC: 7 MG/DL (ref 5–25)
CALCIUM SERPL-MCNC: 8.7 MG/DL (ref 8.4–10.2)
CHLORIDE SERPL-SCNC: 103 MMOL/L (ref 96–108)
CHOLEST SERPL-MCNC: 149 MG/DL (ref ?–200)
CO2 SERPL-SCNC: 26 MMOL/L (ref 21–32)
CREAT SERPL-MCNC: 0.68 MG/DL (ref 0.6–1.3)
EOSINOPHIL # BLD AUTO: 0.18 THOUSAND/ÂΜL (ref 0–0.61)
EOSINOPHIL NFR BLD AUTO: 2 % (ref 0–6)
ERYTHROCYTE [DISTWIDTH] IN BLOOD BY AUTOMATED COUNT: 12.9 % (ref 11.6–15.1)
GFR SERPL CREATININE-BSD FRML MDRD: 112 ML/MIN/1.73SQ M
GLUCOSE P FAST SERPL-MCNC: 87 MG/DL (ref 65–99)
HCT VFR BLD AUTO: 37.1 % (ref 34.8–46.1)
HDLC SERPL-MCNC: 55 MG/DL
HGB BLD-MCNC: 12.3 G/DL (ref 11.5–15.4)
IMM GRANULOCYTES # BLD AUTO: 0.02 THOUSAND/UL (ref 0–0.2)
IMM GRANULOCYTES NFR BLD AUTO: 0 % (ref 0–2)
LDLC SERPL CALC-MCNC: 86 MG/DL (ref 0–100)
LYMPHOCYTES # BLD AUTO: 2.49 THOUSANDS/ÂΜL (ref 0.6–4.47)
LYMPHOCYTES NFR BLD AUTO: 31 % (ref 14–44)
MCH RBC QN AUTO: 30.7 PG (ref 26.8–34.3)
MCHC RBC AUTO-ENTMCNC: 33.2 G/DL (ref 31.4–37.4)
MCV RBC AUTO: 93 FL (ref 82–98)
MONOCYTES # BLD AUTO: 0.79 THOUSAND/ÂΜL (ref 0.17–1.22)
MONOCYTES NFR BLD AUTO: 10 % (ref 4–12)
NEUTROPHILS # BLD AUTO: 4.48 THOUSANDS/ÂΜL (ref 1.85–7.62)
NEUTS SEG NFR BLD AUTO: 57 % (ref 43–75)
NONHDLC SERPL-MCNC: 94 MG/DL
NRBC BLD AUTO-RTO: 0 /100 WBCS
PLATELET # BLD AUTO: 238 THOUSANDS/UL (ref 149–390)
PMV BLD AUTO: 10.2 FL (ref 8.9–12.7)
POTASSIUM SERPL-SCNC: 3.9 MMOL/L (ref 3.5–5.3)
PROT SERPL-MCNC: 6.1 G/DL (ref 6.4–8.4)
RBC # BLD AUTO: 4.01 MILLION/UL (ref 3.81–5.12)
SODIUM SERPL-SCNC: 138 MMOL/L (ref 135–147)
TRIGL SERPL-MCNC: 42 MG/DL (ref ?–150)
WBC # BLD AUTO: 7.99 THOUSAND/UL (ref 4.31–10.16)

## 2025-07-16 PROCEDURE — 36415 COLL VENOUS BLD VENIPUNCTURE: CPT

## 2025-07-16 PROCEDURE — 85025 COMPLETE CBC W/AUTO DIFF WBC: CPT

## 2025-07-16 PROCEDURE — 80053 COMPREHEN METABOLIC PANEL: CPT

## 2025-07-16 PROCEDURE — 98941 CHIROPRACT MANJ 3-4 REGIONS: CPT | Performed by: CHIROPRACTOR

## 2025-07-16 PROCEDURE — 80061 LIPID PANEL: CPT

## 2025-07-16 NOTE — PROGRESS NOTES
"Initial date of service: 6/2/25    Diagnoses and all orders for this visit:    Segmental dysfunction of sacral region    Muscle spasm    Segmental dysfunction of lumbar region    Segmental dysfunction of thoracic region    Segmental dysfunction of cervical region    Mechanical neck/back pain secondary to repetitive st/sp injury, exacerbated by ligament laxity as a result of adjusting her own joints frequently. Reviewed home exercises to stabilize; linked shoulder strenghtening exercises for shoulder instability; will refer to ortho if symptoms persist    Return if symptoms worsen or fail to improve.    TREATMENT: [id number]  Therapeutic Procedures: IASTM - discussed post procedure soreness and/or ecchymosis for up to 36 hrs, applied to affected mm hypertonicities; wall landon, axial retraction, upper trap stretch, lev scap stretch, transitional mvmt education, abdominal bracing;. Thoracic mobilization/manipulation: prone P-A mob, supine A-P manip; cervical mobilization/manipulation: traction, diversified supine graded mobilization; lumbar flexion-traction; R SIJ stafford drop table manuever    HPI:  Cathryn Craig is a 35 y.o. female   Chief Complaint   Patient presents with    Neck - Follow-up     Neck is slightly stiff    Pain score 0    Back Pain     Mid back is stiff   Pain score 0     Pt presents for tx for neck and back pain she attributes to \"popping\" her neck and back frequently. Pt reports she was leaning over the bedframe late May 2025 to pop her back when she felt it compressed a nerve; was bothersome for a day or two, but better since. Pt has baby boy born 2024. Pt reports she has seen chiro in past with limited improvement since she adjusts herself  7/16: pt reports feeling better since last tx; symptoms flared this weekend; L shoulder felt like it popped in and out    Back Pain  This is a recurrent problem. The problem has been rapidly improving since onset. The pain is present in the lumbar spine, " thoracic spine and sacro-iliac. The quality of the pain is described as aching. The pain does not radiate. The symptoms are aggravated by bending, standing and twisting. Pertinent negatives include no bowel incontinence, numbness or weakness.   Neck Pain   Pertinent negatives include no numbness or weakness.   Arm Pain   Pertinent negatives include no numbness.     Past Medical History[1]   The following portions of the patient's history were reviewed and updated as appropriate: allergies, past family history, past medical history, past social history, past surgical history, and problem list.  Review of Systems   Gastrointestinal:  Negative for bowel incontinence.   Musculoskeletal:  Positive for back pain and neck pain.   Neurological:  Negative for weakness and numbness.     Physical Exam    Eyes:      Extraocular Movements: Extraocular movements intact.       Cardiovascular:      Pulses: Normal pulses.   Abdominal:      General: There is no distension.      Tenderness: There is no abdominal tenderness.     Musculoskeletal:      Cervical back: Pain with movement and muscular tenderness present. Decreased range of motion.      Thoracic back: Spasms and tenderness present. Decreased range of motion.      Lumbar back: Spasms and tenderness present. Decreased range of motion. Negative right straight leg raise test and negative left straight leg raise test.        Back:    Lymphadenopathy:      Cervical: No cervical adenopathy.     Skin:     General: Skin is warm and dry.     Neurological:      Mental Status: She is alert and oriented to person, place, and time.      Sensory: Sensation is intact.      Motor: Motor function is intact.      Coordination: Coordination is intact.      Gait: Gait is intact.      Deep Tendon Reflexes: Reflexes normal.     Psychiatric:         Mood and Affect: Mood and affect normal.         Behavior: Behavior normal.     SOFT TISSUE ASSESSMENT: Hypertonicity and tenderness palpated B C5-T6,  L4-S1 erector spinae, upper traps, lev scap, suboccipitals JOINT RECTRICTIONS: C5-T6, L4-S1 and R SIJ             [1]   Past Medical History:  Diagnosis Date    Abnormal Pap smear of cervix     last pap 1/2024    Depression     no meds    Varicella     Vitiligo

## 2025-08-06 ENCOUNTER — TELEPHONE (OUTPATIENT)
Age: 35
End: 2025-08-06

## 2025-08-06 ENCOUNTER — OFFICE VISIT (OUTPATIENT)
Dept: FAMILY MEDICINE CLINIC | Facility: CLINIC | Age: 35
End: 2025-08-06
Payer: MEDICARE

## 2025-08-06 ENCOUNTER — NURSE TRIAGE (OUTPATIENT)
Age: 35
End: 2025-08-06

## 2025-08-06 VITALS
SYSTOLIC BLOOD PRESSURE: 100 MMHG | DIASTOLIC BLOOD PRESSURE: 66 MMHG | TEMPERATURE: 96.7 F | HEIGHT: 67 IN | RESPIRATION RATE: 16 BRPM | WEIGHT: 159 LBS | BODY MASS INDEX: 24.96 KG/M2 | OXYGEN SATURATION: 98 % | HEART RATE: 84 BPM

## 2025-08-06 DIAGNOSIS — H66.003 NON-RECURRENT ACUTE SUPPURATIVE OTITIS MEDIA OF BOTH EARS WITHOUT SPONTANEOUS RUPTURE OF TYMPANIC MEMBRANES: ICD-10-CM

## 2025-08-06 DIAGNOSIS — H66.003 NON-RECURRENT ACUTE SUPPURATIVE OTITIS MEDIA OF BOTH EARS WITHOUT SPONTANEOUS RUPTURE OF TYMPANIC MEMBRANES: Primary | ICD-10-CM

## 2025-08-06 PROBLEM — H66.90 OTITIS MEDIA: Status: ACTIVE | Noted: 2025-08-06

## 2025-08-06 PROBLEM — T14.91XA ATTEMPTED SUICIDE (HCC): Status: RESOLVED | Noted: 2024-09-20 | Resolved: 2025-08-06

## 2025-08-06 PROCEDURE — 99213 OFFICE O/P EST LOW 20 MIN: CPT | Performed by: FAMILY MEDICINE

## 2025-08-06 RX ORDER — AZITHROMYCIN 250 MG/1
TABLET, FILM COATED ORAL
Qty: 6 TABLET | Refills: 0 | Status: SHIPPED | OUTPATIENT
Start: 2025-08-06 | End: 2025-08-11

## 2025-08-06 RX ORDER — AZITHROMYCIN 250 MG/1
TABLET, FILM COATED ORAL
Qty: 6 TABLET | Refills: 0 | Status: SHIPPED | OUTPATIENT
Start: 2025-08-06 | End: 2025-08-06 | Stop reason: CLARIF

## 2025-08-10 ENCOUNTER — NURSE TRIAGE (OUTPATIENT)
Dept: OTHER | Facility: OTHER | Age: 35
End: 2025-08-10

## 2025-08-13 ENCOUNTER — NURSE TRIAGE (OUTPATIENT)
Age: 35
End: 2025-08-13